# Patient Record
Sex: FEMALE | Race: WHITE | Employment: OTHER | URBAN - METROPOLITAN AREA
[De-identification: names, ages, dates, MRNs, and addresses within clinical notes are randomized per-mention and may not be internally consistent; named-entity substitution may affect disease eponyms.]

---

## 2017-04-06 ENCOUNTER — HOSPITAL ENCOUNTER (OUTPATIENT)
Dept: ULTRASOUND IMAGING | Age: 62
Discharge: HOME | End: 2017-04-06
Payer: COMMERCIAL

## 2017-04-06 DIAGNOSIS — R10.9 ABDOMINAL PAIN, UNSPECIFIED SITE: ICD-10-CM

## 2017-04-06 PROCEDURE — 93975 VASCULAR STUDY: CPT

## 2017-04-06 PROCEDURE — 76705 ECHO EXAM OF ABDOMEN: CPT

## 2017-04-07 ENCOUNTER — HOSPITAL ENCOUNTER (OUTPATIENT)
Dept: LAB | Age: 62
Discharge: HOME | End: 2017-04-07
Payer: COMMERCIAL

## 2017-04-07 LAB
ALBUMIN SERPL-MCNC: 4.6 G/DL (ref 3.9–4.9)
ALP BLD-CCNC: 48 U/L (ref 40–130)
ALT SERPL-CCNC: 31 U/L (ref 0–33)
AMYLASE: 51 U/L (ref 28–100)
AST SERPL-CCNC: 27 U/L (ref 0–35)
BILIRUB SERPL-MCNC: 0.4 MG/DL (ref 0–1.2)
BILIRUBIN DIRECT: 0 MG/DL (ref 0–0.3)
BILIRUBIN, INDIRECT: 0.4 MG/DL (ref 0–0.6)
LIPASE: 25 U/L (ref 13–60)
TOTAL PROTEIN: 7.1 G/DL (ref 6.4–8.1)

## 2017-04-07 PROCEDURE — 82150 ASSAY OF AMYLASE: CPT

## 2017-04-07 PROCEDURE — 83690 ASSAY OF LIPASE: CPT

## 2017-04-07 PROCEDURE — 80076 HEPATIC FUNCTION PANEL: CPT

## 2023-07-18 LAB
BACTERIA, URINE: ABNORMAL /HPF
RBC, URINE: 4 /HPF (ref 0–5)
SQUAMOUS EPITHELIAL CELLS, URINE: 2 /HPF
TRANSITIONAL EPITHELIAL CELLS, URINE: <1 /HPF
WBC, URINE: 16 /HPF (ref 0–5)

## 2023-07-20 LAB — URINE CULTURE: ABNORMAL

## 2023-09-13 ENCOUNTER — HOSPITAL ENCOUNTER (OUTPATIENT)
Dept: DATA CONVERSION | Facility: HOSPITAL | Age: 68
End: 2023-09-13
Attending: INTERNAL MEDICINE | Admitting: INTERNAL MEDICINE
Payer: MEDICARE

## 2023-09-13 DIAGNOSIS — Z85.038 PERSONAL HISTORY OF OTHER MALIGNANT NEOPLASM OF LARGE INTESTINE: ICD-10-CM

## 2023-09-13 DIAGNOSIS — K63.5 POLYP OF COLON: ICD-10-CM

## 2023-09-13 DIAGNOSIS — Z98.0 INTESTINAL BYPASS AND ANASTOMOSIS STATUS: ICD-10-CM

## 2023-09-13 DIAGNOSIS — Z12.11 ENCOUNTER FOR SCREENING FOR MALIGNANT NEOPLASM OF COLON: ICD-10-CM

## 2023-09-18 LAB
COMPLETE PATHOLOGY REPORT: NORMAL
CONVERTED CLINICAL DIAGNOSIS-HISTORY: NORMAL
CONVERTED FINAL DIAGNOSIS: NORMAL
CONVERTED FINAL REPORT PDF LINK TO COPY AND PASTE: NORMAL
CONVERTED GROSS DESCRIPTION: NORMAL

## 2023-09-29 VITALS — HEIGHT: 60 IN | WEIGHT: 211.64 LBS | BODY MASS INDEX: 41.55 KG/M2

## 2023-10-04 PROBLEM — E87.1 HYPONATREMIA: Status: ACTIVE | Noted: 2023-10-04

## 2023-10-04 PROBLEM — R35.1 NOCTURIA: Status: ACTIVE | Noted: 2023-10-04

## 2023-10-04 PROBLEM — R12 HEARTBURN: Status: ACTIVE | Noted: 2023-10-04

## 2023-10-04 PROBLEM — Z90.49 S/P LAPAROSCOPIC COLECTOMY: Status: ACTIVE | Noted: 2023-10-04

## 2023-10-04 PROBLEM — R51.9 HEADACHE: Status: ACTIVE | Noted: 2023-10-04

## 2023-10-04 PROBLEM — K29.70 GASTRITIS: Status: ACTIVE | Noted: 2023-10-04

## 2023-10-04 PROBLEM — E66.3 OVERWEIGHT: Status: ACTIVE | Noted: 2023-10-04

## 2023-10-04 PROBLEM — K22.10 ESOPHAGEAL ULCER: Status: ACTIVE | Noted: 2023-10-04

## 2023-10-04 PROBLEM — R73.9 BLOOD GLUCOSE ELEVATED: Status: ACTIVE | Noted: 2023-10-04

## 2023-10-04 PROBLEM — I61.9 STROKE, HEMORRHAGIC (MULTI): Status: ACTIVE | Noted: 2023-10-04

## 2023-10-04 PROBLEM — N39.8 VOIDING DYSFUNCTION: Status: ACTIVE | Noted: 2023-10-04

## 2023-10-04 PROBLEM — E55.9 VITAMIN D DEFICIENCY: Status: ACTIVE | Noted: 2023-10-04

## 2023-10-04 PROBLEM — K21.9 HIATAL HERNIA WITH GASTROESOPHAGEAL REFLUX: Status: ACTIVE | Noted: 2023-10-04

## 2023-10-04 PROBLEM — R73.01 IMPAIRED FASTING GLUCOSE: Status: ACTIVE | Noted: 2023-10-04

## 2023-10-04 PROBLEM — R10.9 ABDOMINAL PAIN: Status: ACTIVE | Noted: 2023-10-04

## 2023-10-04 PROBLEM — I10 HYPERTENSION: Status: ACTIVE | Noted: 2023-10-04

## 2023-10-04 PROBLEM — R63.4 WEIGHT LOSS, ABNORMAL: Status: ACTIVE | Noted: 2023-10-04

## 2023-10-04 PROBLEM — R63.0 DECREASED APPETITE: Status: ACTIVE | Noted: 2023-10-04

## 2023-10-04 PROBLEM — M85.80 OSTEOPENIA: Status: ACTIVE | Noted: 2023-10-04

## 2023-10-04 PROBLEM — K59.00 CONSTIPATION: Status: ACTIVE | Noted: 2023-10-04

## 2023-10-04 PROBLEM — N76.0 VAGINITIS: Status: ACTIVE | Noted: 2023-10-04

## 2023-10-04 PROBLEM — L30.9 ECZEMA: Status: ACTIVE | Noted: 2023-10-04

## 2023-10-04 PROBLEM — J44.9 COPD (CHRONIC OBSTRUCTIVE PULMONARY DISEASE) (MULTI): Status: ACTIVE | Noted: 2023-10-04

## 2023-10-04 PROBLEM — C18.9 ADENOCARCINOMA, COLON (MULTI): Status: ACTIVE | Noted: 2023-10-04

## 2023-10-04 PROBLEM — E78.5 HYPERLIPIDEMIA: Status: ACTIVE | Noted: 2023-10-04

## 2023-10-04 PROBLEM — K44.9 HIATAL HERNIA WITH GASTROESOPHAGEAL REFLUX: Status: ACTIVE | Noted: 2023-10-04

## 2023-10-04 PROBLEM — F32.A DEPRESSION: Status: ACTIVE | Noted: 2023-10-04

## 2023-10-04 PROBLEM — R60.9 DEPENDENT EDEMA: Status: ACTIVE | Noted: 2023-10-04

## 2023-10-04 PROBLEM — K20.90 ESOPHAGITIS: Status: ACTIVE | Noted: 2023-10-04

## 2023-10-04 PROBLEM — F17.200 NICOTINE DEPENDENCE: Status: ACTIVE | Noted: 2023-10-04

## 2023-10-04 PROBLEM — F41.9 ANXIETY: Status: ACTIVE | Noted: 2023-10-04

## 2023-10-04 PROBLEM — R19.00 PELVIC MASS IN FEMALE: Status: ACTIVE | Noted: 2023-10-04

## 2023-10-04 PROBLEM — R19.5 POSITIVE COLORECTAL CANCER SCREENING USING COLOGUARD TEST: Status: ACTIVE | Noted: 2023-10-04

## 2023-10-04 PROBLEM — R34 ANURIA: Status: ACTIVE | Noted: 2023-10-04

## 2023-10-04 PROBLEM — M19.90 OSTEOARTHRITIS: Status: ACTIVE | Noted: 2023-10-04

## 2023-10-04 PROBLEM — D12.6 TUBULAR ADENOMA OF COLON: Status: ACTIVE | Noted: 2023-10-04

## 2023-10-04 PROBLEM — R13.10 DYSPHAGIA: Status: ACTIVE | Noted: 2023-10-04

## 2023-10-04 RX ORDER — OMEPRAZOLE 40 MG/1
CAPSULE, DELAYED RELEASE ORAL
COMMUNITY
Start: 2023-08-31 | End: 2023-10-31

## 2023-10-04 RX ORDER — MULTIVITAMIN
1 TABLET ORAL DAILY
COMMUNITY

## 2023-10-04 RX ORDER — BENZOCAINE 200 MG/G
GEL DENTAL; ORAL; PERIODONTAL
COMMUNITY
Start: 2023-09-01 | End: 2024-04-30 | Stop reason: ENTERED-IN-ERROR

## 2023-10-04 RX ORDER — HYDRALAZINE HYDROCHLORIDE 50 MG/1
1 TABLET, FILM COATED ORAL DAILY
COMMUNITY
End: 2024-04-30 | Stop reason: ENTERED-IN-ERROR

## 2023-10-04 RX ORDER — SERTRALINE HYDROCHLORIDE 50 MG/1
50 TABLET, FILM COATED ORAL DAILY
COMMUNITY

## 2023-10-04 RX ORDER — CARVEDILOL 12.5 MG/1
1 TABLET ORAL DAILY
COMMUNITY

## 2023-10-04 RX ORDER — HYDRALAZINE HYDROCHLORIDE 25 MG/1
1 TABLET, FILM COATED ORAL 2 TIMES DAILY
COMMUNITY
End: 2024-04-30 | Stop reason: ENTERED-IN-ERROR

## 2023-10-04 RX ORDER — BUSPIRONE HYDROCHLORIDE 10 MG/1
TABLET ORAL
COMMUNITY
End: 2024-04-30 | Stop reason: ENTERED-IN-ERROR

## 2023-10-04 RX ORDER — DOCUSATE SODIUM 100 MG/1
100 CAPSULE, LIQUID FILLED ORAL 2 TIMES DAILY
COMMUNITY
Start: 2023-05-17 | End: 2024-04-30 | Stop reason: ENTERED-IN-ERROR

## 2023-10-04 RX ORDER — POLYETHYLENE GLYCOL 3350 17 G/17G
POWDER, FOR SOLUTION ORAL
COMMUNITY
Start: 2023-05-17 | End: 2024-04-30 | Stop reason: ENTERED-IN-ERROR

## 2023-10-04 RX ORDER — ACETAMINOPHEN 500 MG
TABLET ORAL
COMMUNITY
Start: 2023-09-01 | End: 2024-04-30 | Stop reason: ENTERED-IN-ERROR

## 2023-10-04 RX ORDER — CHLORHEXIDINE GLUCONATE ORAL RINSE 1.2 MG/ML
SOLUTION DENTAL
COMMUNITY
Start: 2023-09-01 | End: 2024-04-30 | Stop reason: ENTERED-IN-ERROR

## 2023-10-04 RX ORDER — TRAZODONE HYDROCHLORIDE 100 MG/1
1 TABLET ORAL DAILY
COMMUNITY
End: 2024-04-30 | Stop reason: ENTERED-IN-ERROR

## 2023-10-04 RX ORDER — ROSUVASTATIN CALCIUM 10 MG/1
10 TABLET, COATED ORAL DAILY
COMMUNITY

## 2023-10-04 RX ORDER — HYDROXYZINE HYDROCHLORIDE 25 MG/1
1 TABLET, FILM COATED ORAL 3 TIMES DAILY PRN
COMMUNITY
End: 2024-04-30 | Stop reason: ENTERED-IN-ERROR

## 2023-10-04 RX ORDER — BISACODYL 10 MG/1
SUPPOSITORY RECTAL
COMMUNITY
Start: 2023-05-17 | End: 2024-04-30 | Stop reason: ENTERED-IN-ERROR

## 2023-10-04 RX ORDER — AMLODIPINE BESYLATE 10 MG/1
TABLET ORAL
COMMUNITY
Start: 2023-07-23 | End: 2024-04-30 | Stop reason: ENTERED-IN-ERROR

## 2023-10-04 RX ORDER — ARIPIPRAZOLE 10 MG/1
TABLET ORAL
COMMUNITY
End: 2024-04-30 | Stop reason: ENTERED-IN-ERROR

## 2023-10-04 RX ORDER — MAG HYDROX/ALUMINUM HYD/SIMETH 200-200-20
1 SUSPENSION, ORAL (FINAL DOSE FORM) ORAL AS NEEDED
COMMUNITY
Start: 2023-09-01

## 2023-10-04 RX ORDER — IBUPROFEN 800 MG/1
TABLET ORAL
COMMUNITY
Start: 2023-08-21 | End: 2024-04-30 | Stop reason: ENTERED-IN-ERROR

## 2023-10-05 ENCOUNTER — LAB (OUTPATIENT)
Dept: LAB | Facility: LAB | Age: 68
End: 2023-10-05
Payer: MEDICARE

## 2023-10-05 ENCOUNTER — OFFICE VISIT (OUTPATIENT)
Dept: GASTROENTEROLOGY | Facility: CLINIC | Age: 68
End: 2023-10-05
Payer: MEDICARE

## 2023-10-05 VITALS
DIASTOLIC BLOOD PRESSURE: 58 MMHG | SYSTOLIC BLOOD PRESSURE: 90 MMHG | HEART RATE: 54 BPM | HEIGHT: 60 IN | BODY MASS INDEX: 41.33 KG/M2

## 2023-10-05 DIAGNOSIS — R93.3 ABNORMAL COLONOSCOPY: Primary | ICD-10-CM

## 2023-10-05 DIAGNOSIS — Z00.00 HEALTH CARE MAINTENANCE: ICD-10-CM

## 2023-10-05 LAB
CREAT SERPL-MCNC: 0.83 MG/DL (ref 0.5–1.05)
GFR SERPL CREATININE-BSD FRML MDRD: 78 ML/MIN/1.73M*2

## 2023-10-05 PROCEDURE — 36415 COLL VENOUS BLD VENIPUNCTURE: CPT

## 2023-10-05 PROCEDURE — 3078F DIAST BP <80 MM HG: CPT | Performed by: NURSE PRACTITIONER

## 2023-10-05 PROCEDURE — 3074F SYST BP LT 130 MM HG: CPT | Performed by: NURSE PRACTITIONER

## 2023-10-05 PROCEDURE — 82565 ASSAY OF CREATININE: CPT

## 2023-10-05 PROCEDURE — 99214 OFFICE O/P EST MOD 30 MIN: CPT | Performed by: NURSE PRACTITIONER

## 2023-10-05 NOTE — PROGRESS NOTES
This note was created using voice recognition transcription software. Despite proofreading, unintentional typographical errors may be present. Please contact the GI office with any questions or concerns.     This is a 68 yo F who comes to the office today for a follow-up in regards to constipation. She originally established care with myself on 8/2/23 and came in for a colonoscopy screening, constipation and evaluation of heartburn. She is accompanied by her mom and currently lives with her elderly parents. She has a PMH of colon CA s/p L partial colectomy (2021), depression, anxiety, dyslipidemia, HTN, former ETOH abuse, former tobacco use, and constipation.    8/2/23-She was tearful during the visit and admitted to much depression. She has poor dentition and has lost about 40 lbs in the last 6 mos d/t to this, and heartburn. She has difficulty chewing. She denies abdominal pain. She states she is long overdue for a colonoscopy and was scheduled for one at some point but cancelled d/t severe constipation. She has gone as long as 2-3 mos without a BM. Typical once a month. Was taking Miralax daily and 3 weeks ago, added Metamucil at nighttime, now is having daily BM's but they hurt coming out. Doesn't get a lot of activity d/t depression. Trying to go for walks.       8/30/23-Patient states she's taking Miralax bid and Metamucil daily and she is now having small BM's daily. She feels much more improved but still incompletely evacuating. Hasn't taken Boost/Ensure drinks. Has been getting dental work done but it's not completed so chewing is difficult. Still eating a lot of processed foods.      10/5/23-    +Cologuard 7/20/21  EGD-12/7/21 showing duodenitis, gastritis, medium sized hiatal hernia, LA Grade B esophagitis. 10/7/16 showing gastritis.  Colonoscopy-9/13/23, 11/22/21, 10/20/21  BM-Daily, no bleeding. Weight loss as stated above. Longest without a BM was 2-3 mos. Typical once monthly.  Denies any constipation  at this time.  FHX-Dad had soft tissue sarcoma, Maunt had pancreatic CA  SHX-Former ETOH abuse, former tobacco use, never illicits  Ab Sx-Partial :L colectomy (2021)  NSAIDs-Denies       Current Medications: reviewed    A 10 point review of system is negative except for what is mentioned in the HPI    Vital Signs: Reviewed    Physical Exam:  General: Calm  Skin: Warm and dry, no jaundice  HEENT: No scleral icterus, no conjunctival pallor, normocephalic, atraumatic, mucous membranes moist  Neck: atraumatic, trachea midline, no JVD  Chest: Clear to auscultation bilaterally. No wheezes, rales, or rhonchi  CV: Regular rate and rhythm. Positive S1/S2  Abdomen: no distension, +BS, soft, non-tender to palpation, no rebound tenderness, no guarding, no rigidity, no discernible ascites   Extremities: no lower extremity edema, Chronic pigmentary changes, no cyanosis  Neurological: A&Ox3 , no asterixis  Psychiatric: cooperative     Investigations:  Labs, radiological imaging and cardiac work up were reviewed    Visit Vitals  BP 90/58   Pulse 54   Ht 1.524 m (5')   BMI 41.33 kg/m²   Smoking Status Never   BSA 2.02 m²      Medication Documentation Review Audit    **Prior to Admission medications have not yet been reviewed**      Assessment:      This is a 68 yo F who comes to the office today for a follow-up in regards to constipation. She originally established care with myself on 8/2/23 and came in for a colonoscopy screening, constipation and evaluation of heartburn. She has a PMH of colon CA s/p L partial colectomy (2021), depression, anxiety, dyslipidemia, HTN, former ETOH abuse, former tobacco use, and constipation.  Colonoscopy was performed on 9/13/23 by Dr. Mansfield and it showed s/p L  colectomy with end to end anastomosis.  He wasn't able to extend past the distal portion as there was severe restricted mobility.  This needs further evaluated.  Of note, she states she is no longer constipated.  Eating a diet high in  fiber, going for walks and drinking plenty of fluid.    Plan  1.)  Colonoscopy screening-Repeat in 2 years.    2.)  Restricted mobility during colonoscopy-CTE, BMP  3.)  Follow-up after testing.    Total time spent was approximately 30 minutes.

## 2023-10-17 ENCOUNTER — HOSPITAL ENCOUNTER (OUTPATIENT)
Dept: RADIOLOGY | Facility: CLINIC | Age: 68
Discharge: HOME | End: 2023-10-17
Payer: MEDICARE

## 2023-10-17 DIAGNOSIS — R93.3 ABNORMAL COLONOSCOPY: ICD-10-CM

## 2023-10-17 DIAGNOSIS — K76.0 FATTY LIVER: Primary | ICD-10-CM

## 2023-10-17 PROCEDURE — 74177 CT ABD & PELVIS W/CONTRAST: CPT | Mod: MG

## 2023-10-17 PROCEDURE — 74177 CT ABD & PELVIS W/CONTRAST: CPT | Performed by: STUDENT IN AN ORGANIZED HEALTH CARE EDUCATION/TRAINING PROGRAM

## 2023-10-17 PROCEDURE — 2550000001 HC RX 255 CONTRASTS: Performed by: NURSE PRACTITIONER

## 2023-10-17 RX ADMIN — IOHEXOL 90 ML: 350 INJECTION, SOLUTION INTRAVENOUS at 10:24

## 2023-10-20 ENCOUNTER — TELEPHONE (OUTPATIENT)
Dept: GASTROENTEROLOGY | Facility: CLINIC | Age: 68
End: 2023-10-20
Payer: MEDICARE

## 2023-10-20 NOTE — TELEPHONE ENCOUNTER
Called and spoke to Tuyet and gave her the results of her CT and to get a Fibroscan and to see a dietician. HUSSAIN

## 2023-10-24 DIAGNOSIS — R12 HEARTBURN: ICD-10-CM

## 2023-10-30 RX ORDER — LISINOPRIL 10 MG/1
10 TABLET ORAL
COMMUNITY
Start: 2017-09-11 | End: 2024-04-30 | Stop reason: ENTERED-IN-ERROR

## 2023-10-30 RX ORDER — TRIAMCINOLONE ACETONIDE 1 MG/G
0.1 CREAM TOPICAL 2 TIMES DAILY
COMMUNITY
Start: 2017-09-11 | End: 2024-04-30 | Stop reason: ENTERED-IN-ERROR

## 2023-10-31 RX ORDER — OMEPRAZOLE 40 MG/1
CAPSULE, DELAYED RELEASE ORAL
Qty: 30 CAPSULE | Refills: 2 | Status: SHIPPED | OUTPATIENT
Start: 2023-10-31

## 2024-01-02 DIAGNOSIS — C18.9 ADENOCARCINOMA, COLON (MULTI): Primary | ICD-10-CM

## 2024-01-15 ENCOUNTER — APPOINTMENT (OUTPATIENT)
Dept: HEMATOLOGY/ONCOLOGY | Facility: CLINIC | Age: 69
End: 2024-01-15
Payer: MEDICARE

## 2024-04-03 ENCOUNTER — LAB (OUTPATIENT)
Dept: LAB | Facility: CLINIC | Age: 69
End: 2024-04-03
Payer: MEDICARE

## 2024-04-03 ENCOUNTER — OFFICE VISIT (OUTPATIENT)
Dept: HEMATOLOGY/ONCOLOGY | Facility: CLINIC | Age: 69
End: 2024-04-03
Payer: MEDICARE

## 2024-04-03 VITALS
DIASTOLIC BLOOD PRESSURE: 71 MMHG | BODY MASS INDEX: 27.47 KG/M2 | RESPIRATION RATE: 16 BRPM | TEMPERATURE: 97.5 F | OXYGEN SATURATION: 95 % | HEIGHT: 59 IN | WEIGHT: 136.24 LBS | HEART RATE: 86 BPM | SYSTOLIC BLOOD PRESSURE: 166 MMHG

## 2024-04-03 DIAGNOSIS — C18.9 ADENOCARCINOMA, COLON (MULTI): Primary | ICD-10-CM

## 2024-04-03 DIAGNOSIS — C18.9 ADENOCARCINOMA, COLON (MULTI): ICD-10-CM

## 2024-04-03 LAB
ALBUMIN SERPL BCP-MCNC: 3.9 G/DL (ref 3.4–5)
ALP SERPL-CCNC: 56 U/L (ref 33–136)
ALT SERPL W P-5'-P-CCNC: 15 U/L (ref 7–45)
ANION GAP SERPL CALC-SCNC: 11 MMOL/L (ref 10–20)
AST SERPL W P-5'-P-CCNC: 21 U/L (ref 9–39)
BASOPHILS # BLD AUTO: 0.02 X10*3/UL (ref 0–0.1)
BASOPHILS NFR BLD AUTO: 0.3 %
BILIRUB SERPL-MCNC: 0.4 MG/DL (ref 0–1.2)
BUN SERPL-MCNC: 4 MG/DL (ref 6–23)
CALCIUM SERPL-MCNC: 9 MG/DL (ref 8.6–10.3)
CEA SERPL-MCNC: 5.6 UG/L
CHLORIDE SERPL-SCNC: 98 MMOL/L (ref 98–107)
CO2 SERPL-SCNC: 27 MMOL/L (ref 21–32)
CREAT SERPL-MCNC: 0.69 MG/DL (ref 0.5–1.05)
EGFRCR SERPLBLD CKD-EPI 2021: >90 ML/MIN/1.73M*2
EOSINOPHIL # BLD AUTO: 0.31 X10*3/UL (ref 0–0.7)
EOSINOPHIL NFR BLD AUTO: 4.6 %
ERYTHROCYTE [DISTWIDTH] IN BLOOD BY AUTOMATED COUNT: 14.1 % (ref 11.5–14.5)
GLUCOSE SERPL-MCNC: 118 MG/DL (ref 74–99)
HCT VFR BLD AUTO: 41.3 % (ref 36–46)
HGB BLD-MCNC: 14.2 G/DL (ref 12–16)
IMM GRANULOCYTES # BLD AUTO: 0.02 X10*3/UL (ref 0–0.7)
IMM GRANULOCYTES NFR BLD AUTO: 0.3 % (ref 0–0.9)
LYMPHOCYTES # BLD AUTO: 1.73 X10*3/UL (ref 1.2–4.8)
LYMPHOCYTES NFR BLD AUTO: 25.5 %
MCH RBC QN AUTO: 30.8 PG (ref 26–34)
MCHC RBC AUTO-ENTMCNC: 34.4 G/DL (ref 32–36)
MCV RBC AUTO: 90 FL (ref 80–100)
MONOCYTES # BLD AUTO: 0.63 X10*3/UL (ref 0.1–1)
MONOCYTES NFR BLD AUTO: 9.3 %
NEUTROPHILS # BLD AUTO: 4.07 X10*3/UL (ref 1.2–7.7)
NEUTROPHILS NFR BLD AUTO: 60 %
NRBC BLD-RTO: 0 /100 WBCS (ref 0–0)
PLATELET # BLD AUTO: 289 X10*3/UL (ref 150–450)
POTASSIUM SERPL-SCNC: 3.6 MMOL/L (ref 3.5–5.3)
PROT SERPL-MCNC: 7.1 G/DL (ref 6.4–8.2)
RBC # BLD AUTO: 4.61 X10*6/UL (ref 4–5.2)
SODIUM SERPL-SCNC: 132 MMOL/L (ref 136–145)
WBC # BLD AUTO: 6.8 X10*3/UL (ref 4.4–11.3)

## 2024-04-03 PROCEDURE — 3077F SYST BP >= 140 MM HG: CPT | Performed by: INTERNAL MEDICINE

## 2024-04-03 PROCEDURE — 99213 OFFICE O/P EST LOW 20 MIN: CPT | Performed by: INTERNAL MEDICINE

## 2024-04-03 PROCEDURE — 82378 CARCINOEMBRYONIC ANTIGEN: CPT | Mod: PARLAB

## 2024-04-03 PROCEDURE — 85025 COMPLETE CBC W/AUTO DIFF WBC: CPT

## 2024-04-03 PROCEDURE — 36415 COLL VENOUS BLD VENIPUNCTURE: CPT

## 2024-04-03 PROCEDURE — 84075 ASSAY ALKALINE PHOSPHATASE: CPT

## 2024-04-03 PROCEDURE — 1159F MED LIST DOCD IN RCRD: CPT | Performed by: INTERNAL MEDICINE

## 2024-04-03 PROCEDURE — 1126F AMNT PAIN NOTED NONE PRSNT: CPT | Performed by: INTERNAL MEDICINE

## 2024-04-03 PROCEDURE — 3078F DIAST BP <80 MM HG: CPT | Performed by: INTERNAL MEDICINE

## 2024-04-03 ASSESSMENT — PAIN SCALES - GENERAL: PAINLEVEL: 0-NO PAIN

## 2024-04-03 ASSESSMENT — COLUMBIA-SUICIDE SEVERITY RATING SCALE - C-SSRS
6. HAVE YOU EVER DONE ANYTHING, STARTED TO DO ANYTHING, OR PREPARED TO DO ANYTHING TO END YOUR LIFE?: NO
2. HAVE YOU ACTUALLY HAD ANY THOUGHTS OF KILLING YOURSELF?: NO
1. IN THE PAST MONTH, HAVE YOU WISHED YOU WERE DEAD OR WISHED YOU COULD GO TO SLEEP AND NOT WAKE UP?: NO

## 2024-04-03 ASSESSMENT — PATIENT HEALTH QUESTIONNAIRE - PHQ9
1. LITTLE INTEREST OR PLEASURE IN DOING THINGS: NOT AT ALL
SUM OF ALL RESPONSES TO PHQ9 QUESTIONS 1 AND 2: 0
2. FEELING DOWN, DEPRESSED OR HOPELESS: NOT AT ALL

## 2024-04-03 NOTE — PROGRESS NOTES
Cancer History:   Treatment Synopsis:    FINAL DIAGNOSIS   A. COLON, LEFT PARTIAL COLECTOMY:   -- NO EVIDENCE OF RESIDUAL CARCINOMA; SEE COMMENT AND SUMMARY REPORT.   -- AREA  OF TATTOO AND REACTIVE CHANGES CONSISTENT WITH PRIOR BIOPSY SITE   PRESENT.   -- TWENTY LYMPH NODES, NEGATIVE FOR TUMOR (0/20).   -- MARGINS OF RESECTION, NEGATIVE FOR DYSPLASIA AND TUMOR.   PATHOLOGIC STAGE CLASSIFICATION (pTNM, AJCC 8th Edition)   pT Category: pT1   pN Category: pN0, Mx  Satge I               History of Present Illness:      ID Statement:    DEANGELO MAYBERRY is a 67 year old Female        Interval History:    The patient was referred to me by Dr. Saha for further evaluation and management of adenocarcinoma of the colon. The patient presents as a new patient on 03/07/2022.      The patient is a 66 year old with a past medical history significant for HTN, anxiety and depression, B12 and folate deficiencies.  The patient  was doing well until she started to have progressive abdominal pain. The patient has been evaluated with a  colonoscopy that revealed abnormal findings. Pathology was consistent with adenocarcinoma of the colon. The patient then underwent a left colectomy. She was then referred to me for further evaluation and management.      At interview on 03/07/2022 the patient narrated her past medical history. The patient denies any history of fevers, night sweats, unexplained weight loss, shortness of breath, chest pain, constipation, confusion, nausea, vomiting, diarrhea, hemoptysis,  hematemesis, hematuria and hematochezia. No history of localized or systemic bleeding and infection.      The patient had come for a follow up on April 3, 2024 regarding her history of   adenocarcinoma of the colon. The patient presents to discuss treatment plans. She denied any new symptoms at this time other than increased anxiety by which she was recently  admitted to a psychiatric facility to assist in managing her increased anxiety.       Past Medical History:   1. Adenocarcinoma of the colon   2. HTN   3. Anxiety and depression   4. B12 and folate deficiencies      Past Surgical History:   1. Colonoscopy and colectomy      Last mammogram was a few years ago.   Last colonoscopy was in 2022.            Review of Systems:   ·  System Review All other systems have been reviewed and are negative for complaint.            Allergies and Intolerances:       Allergies:         No Known Allergies: Active     Outpatient Medication Profile:  * Patient Currently Takes Medications as of 10-Jul-2023 13:18 documented in Structured Notes         Multiple Vitamins oral tablet : Last Dose Taken:  , 1 tab(s) orally once a day---HOLDING         Vitamin B12 50 mcg oral tablet: Last Dose Taken:  , 1 tab(s) orally once  a day         rosuvastatin 10 mg oral tablet: Last Dose Taken:  , 1 tab(s) orally once  a day         amLODIPine 10 mg oral tablet: Last Dose Taken:  , 1 tab(s) orally once  a day         Zoloft 50 mg oral tablet: Last Dose Taken:  , 1 tab(s) orally once a  day         carvedilol 12.5 mg oral tablet: Last Dose Taken:  , 1 tab(s) orally 2  times a day         hydrALAZINE 25 mg oral tablet: Last Dose Taken:  , 1 tab(s) orally 4  times a day        Family History: No Family History items are recorded  in the problem list.      Social History:   Social Substance History:  ·  Smoking Status former smoker   ·  Tobacco Use history of abuse   ·  Alcohol Use occasionally   ·  Drug Use denies   ·  Additional History     66 years old      Has 2 grown children   Worked as a    Used to smoke 1-2 packs a day for 40 years then quit   Occasional drinking   No drugs (1)           Vitals and Measurements:   Vitals: Temp: 36.6  HR: 77  RR: 18  BP: 130/74  SPO2%:   97   Measurements: HT(cm): 151  WT(kg): 50  BSA: 1.44   BMI:  21.9      Physical Exam:      Constitutional: Well developed, awake/alert/oriented  x3, no distress, alert and cooperative    Eyes: PERRL, EOMI, clear sclera   ENMT: mucous membranes moist, no apparent injury,  no lesions seen   Head/Neck: Neck supple, no apparent injury, thyroid  without mass or tenderness, No JVD, trachea midline, no bruits   Respiratory/Thorax: Patent airways, CTAB, normal  breath sounds with good chest expansion, thorax symmetric   Cardiovascular: Regular, rate and rhythm, no murmurs,  2+ equal pulses of the extremities, normal S 1and S 2   Gastrointestinal: Nondistended, soft, non-tender,  no rebound tenderness or guarding, no masses palpable, no organomegaly, +BS, no bruits   Genitourinary: No Discharge, vesicles or other abnormalities   Musculoskeletal: ROM intact, no joint swelling, normal  strength   Extremities: normal extremities, no cyanosis edema,  contusions or wounds, no clubbing   Neurological: alert and oriented x3, intact senses,  motor, response and reflexes, normal strength   Breast: No masses, tenderness, no discharge or discoloration   Lymphatic: No significant lymphadenopathy   Psychological: Appropriate mood and behavior   Skin: Warm and dry, no lesions, no rashes         Lab Results:           Pathology Results:     ·  Results     No Results have been selected. Please select Results from the Available Results list before marking as Notified.     Assessment and Plan:         1. Adenocarcinoma of the colon     The patient was referred to me by Dr. Saha for further evaluation and management of adenocarcinoma of the colon. The patient presents as a new patient on 03/07/2022.      The patient is a 66 year old with a past medical history significant for HTN, anxiety and depression, B12 and folate deficiencies.  The patient  was doing well until she started to have progressive abdominal pain. The patient has been evaluated with a  colonoscopy that revealed abnormal findings. Pathology was consistent with adenocarcinoma of the colon.  CT scan of chest abdomen pelvis in October 2021 did not reveal  evidence of metastatic disease.  The patient then underwent a left colectomy. Physical  exam was within normal limits. I reviewed the lab data with the patient. Most recent CBC obtained on 12/01/2021 revealed WBC 9.5, HGB 13.8, HCT 40.1, , Neut 5.87. I had a detailed discussion with the patient and explained to her that she is early  stage and has a good prognosis. I explained the pathophysiology of adenocarcinoma of the colon and described possible treatment plans. I explained to the patient that I would review her lab, pathology and imaging results and then devise a treatment plan  with her at her next visit. Return in 2 weeks.      The patient had come for a follow up on April 3, 2024.   I reviewed the lab data with the patient.  The patient should be followed clinically at this time as she is s/p colectomy with no additional treatment. She  declined colon cancer prevention trial. She should be seen by  to assist with her anxiety.  The patient claims that recently she was evaluated at [facility] for constipation.  A CAT scan did not reveal any evidence of local  distal recurrence.  The patient received symptom relief treatment and was discharged.        2. HTN   - Carvedilol 6.25 mg PO QD   - Hydralazine 50 mg PO QD      3. Anxiety and depression  - Zoloft 25mg PO QD      4. B12 and folate deficiencies   - Folic acid 1 mg  PO QD   - Vitamin B12 50 mcg PO QD

## 2024-04-30 ENCOUNTER — APPOINTMENT (OUTPATIENT)
Dept: RADIOLOGY | Facility: HOSPITAL | Age: 69
DRG: 552 | End: 2024-04-30
Payer: MEDICARE

## 2024-04-30 ENCOUNTER — HOSPITAL ENCOUNTER (EMERGENCY)
Facility: HOSPITAL | Age: 69
Discharge: OTHER INSTITUTION | End: 2024-04-30
Attending: STUDENT IN AN ORGANIZED HEALTH CARE EDUCATION/TRAINING PROGRAM
Payer: MEDICARE

## 2024-04-30 ENCOUNTER — APPOINTMENT (OUTPATIENT)
Dept: RADIOLOGY | Facility: HOSPITAL | Age: 69
End: 2024-04-30
Payer: MEDICARE

## 2024-04-30 ENCOUNTER — HOSPITAL ENCOUNTER (INPATIENT)
Facility: HOSPITAL | Age: 69
LOS: 1 days | Discharge: HOME | DRG: 552 | End: 2024-05-02
Attending: EMERGENCY MEDICINE | Admitting: SURGERY
Payer: MEDICARE

## 2024-04-30 ENCOUNTER — HOSPITAL ENCOUNTER (OUTPATIENT)
Dept: CARDIOLOGY | Facility: HOSPITAL | Age: 69
Discharge: HOME | End: 2024-04-30
Payer: MEDICARE

## 2024-04-30 DIAGNOSIS — K66.8 PERITONEAL FREE AIR: Primary | ICD-10-CM

## 2024-04-30 DIAGNOSIS — Z90.49 S/P LAPAROSCOPIC COLECTOMY: ICD-10-CM

## 2024-04-30 DIAGNOSIS — S22.080D COMPRESSION FRACTURE OF T11 VERTEBRA WITH ROUTINE HEALING: ICD-10-CM

## 2024-04-30 DIAGNOSIS — I24.9 ACS (ACUTE CORONARY SYNDROME) (MULTI): ICD-10-CM

## 2024-04-30 DIAGNOSIS — Z04.1 EXAM FOLLOWING MVC (MOTOR VEHICLE COLLISION), NO APPARENT INJURY: Primary | ICD-10-CM
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
## 2024-04-30 DIAGNOSIS — S32.029A CLOSED FRACTURE OF SECOND LUMBAR VERTEBRA, UNSPECIFIED FRACTURE MORPHOLOGY, INITIAL ENCOUNTER (MULTI): ICD-10-CM

## 2024-04-30 DIAGNOSIS — K66.8 INTRA-ABDOMINAL FREE AIR OF UNKNOWN ETIOLOGY: ICD-10-CM

## 2024-04-30 LAB
ABO GROUP (TYPE) IN BLOOD: NORMAL
ALBUMIN SERPL BCP-MCNC: 4.2 G/DL (ref 3.4–5)
ALP SERPL-CCNC: 70 U/L (ref 33–136)
ALT SERPL W P-5'-P-CCNC: 17 U/L (ref 7–45)
ANION GAP SERPL CALC-SCNC: 11 MMOL/L (ref 10–20)
ANTIBODY SCREEN: NORMAL
AST SERPL W P-5'-P-CCNC: 26 U/L (ref 9–39)
BASOPHILS # BLD AUTO: 0.02 X10*3/UL (ref 0–0.1)
BASOPHILS NFR BLD AUTO: 0.3 %
BILIRUB SERPL-MCNC: 0.5 MG/DL (ref 0–1.2)
BUN SERPL-MCNC: 4 MG/DL (ref 6–23)
CALCIUM SERPL-MCNC: 9.6 MG/DL (ref 8.6–10.3)
CARDIAC TROPONIN I PNL SERPL HS: 6 NG/L (ref 0–13)
CARDIAC TROPONIN I PNL SERPL HS: 9 NG/L (ref 0–13)
CHLORIDE SERPL-SCNC: 95 MMOL/L (ref 98–107)
CO2 SERPL-SCNC: 27 MMOL/L (ref 21–32)
CREAT SERPL-MCNC: 0.66 MG/DL (ref 0.5–1.05)
EGFRCR SERPLBLD CKD-EPI 2021: >90 ML/MIN/1.73M*2
EOSINOPHIL # BLD AUTO: 0.26 X10*3/UL (ref 0–0.7)
EOSINOPHIL NFR BLD AUTO: 4 %
ERYTHROCYTE [DISTWIDTH] IN BLOOD BY AUTOMATED COUNT: 13.2 % (ref 11.5–14.5)
ERYTHROCYTE [DISTWIDTH] IN BLOOD BY AUTOMATED COUNT: 13.2 % (ref 11.5–14.5)
ETHANOL SERPL-MCNC: 47 MG/DL
GLUCOSE SERPL-MCNC: 91 MG/DL (ref 74–99)
HCT VFR BLD AUTO: 40.4 % (ref 36–46)
HCT VFR BLD AUTO: 42.6 % (ref 36–46)
HGB BLD-MCNC: 14.2 G/DL (ref 12–16)
HGB BLD-MCNC: 15.1 G/DL (ref 12–16)
IMM GRANULOCYTES # BLD AUTO: 0.02 X10*3/UL (ref 0–0.7)
IMM GRANULOCYTES NFR BLD AUTO: 0.3 % (ref 0–0.9)
INR PPP: 1 (ref 0.9–1.1)
LACTATE SERPL-SCNC: 0.6 MMOL/L (ref 0.4–2)
LACTATE SERPL-SCNC: 0.7 MMOL/L (ref 0.4–2)
LYMPHOCYTES # BLD AUTO: 2.17 X10*3/UL (ref 1.2–4.8)
LYMPHOCYTES NFR BLD AUTO: 33.1 %
MCH RBC QN AUTO: 30.6 PG (ref 26–34)
MCH RBC QN AUTO: 30.7 PG (ref 26–34)
MCHC RBC AUTO-ENTMCNC: 35.1 G/DL (ref 32–36)
MCHC RBC AUTO-ENTMCNC: 35.4 G/DL (ref 32–36)
MCV RBC AUTO: 86 FL (ref 80–100)
MCV RBC AUTO: 87 FL (ref 80–100)
MONOCYTES # BLD AUTO: 0.74 X10*3/UL (ref 0.1–1)
MONOCYTES NFR BLD AUTO: 11.3 %
NEUTROPHILS # BLD AUTO: 3.35 X10*3/UL (ref 1.2–7.7)
NEUTROPHILS NFR BLD AUTO: 51 %
NRBC BLD-RTO: 0 /100 WBCS (ref 0–0)
NRBC BLD-RTO: 0 /100 WBCS (ref 0–0)
PLATELET # BLD AUTO: 255 X10*3/UL (ref 150–450)
PLATELET # BLD AUTO: 279 X10*3/UL (ref 150–450)
POTASSIUM SERPL-SCNC: 3.9 MMOL/L (ref 3.5–5.3)
PROT SERPL-MCNC: 7.1 G/DL (ref 6.4–8.2)
PROTHROMBIN TIME: 11.1 SECONDS (ref 9.8–12.8)
RBC # BLD AUTO: 4.63 X10*6/UL (ref 4–5.2)
RBC # BLD AUTO: 4.93 X10*6/UL (ref 4–5.2)
RH FACTOR (ANTIGEN D): NORMAL
SODIUM SERPL-SCNC: 129 MMOL/L (ref 136–145)
WBC # BLD AUTO: 6.6 X10*3/UL (ref 4.4–11.3)
WBC # BLD AUTO: 7.1 X10*3/UL (ref 4.4–11.3)

## 2024-04-30 PROCEDURE — 96375 TX/PRO/DX INJ NEW DRUG ADDON: CPT

## 2024-04-30 PROCEDURE — 86901 BLOOD TYPING SEROLOGIC RH(D): CPT | Performed by: STUDENT IN AN ORGANIZED HEALTH CARE EDUCATION/TRAINING PROGRAM

## 2024-04-30 PROCEDURE — 70450 CT HEAD/BRAIN W/O DYE: CPT | Performed by: RADIOLOGY

## 2024-04-30 PROCEDURE — 70450 CT HEAD/BRAIN W/O DYE: CPT

## 2024-04-30 PROCEDURE — 2500000004 HC RX 250 GENERAL PHARMACY W/ HCPCS (ALT 636 FOR OP/ED): Performed by: STUDENT IN AN ORGANIZED HEALTH CARE EDUCATION/TRAINING PROGRAM

## 2024-04-30 PROCEDURE — 99285 EMERGENCY DEPT VISIT HI MDM: CPT | Mod: 25

## 2024-04-30 PROCEDURE — 80053 COMPREHEN METABOLIC PANEL: CPT | Performed by: STUDENT IN AN ORGANIZED HEALTH CARE EDUCATION/TRAINING PROGRAM

## 2024-04-30 PROCEDURE — 72170 X-RAY EXAM OF PELVIS: CPT | Performed by: RADIOLOGY

## 2024-04-30 PROCEDURE — G0390 TRAUMA RESPONS W/HOSP CRITI: HCPCS

## 2024-04-30 PROCEDURE — 2500000004 HC RX 250 GENERAL PHARMACY W/ HCPCS (ALT 636 FOR OP/ED): Performed by: EMERGENCY MEDICINE

## 2024-04-30 PROCEDURE — 84484 ASSAY OF TROPONIN QUANT: CPT | Performed by: STUDENT IN AN ORGANIZED HEALTH CARE EDUCATION/TRAINING PROGRAM

## 2024-04-30 PROCEDURE — 99285 EMERGENCY DEPT VISIT HI MDM: CPT | Performed by: EMERGENCY MEDICINE

## 2024-04-30 PROCEDURE — 74177 CT ABD & PELVIS W/CONTRAST: CPT

## 2024-04-30 PROCEDURE — 2500000004 HC RX 250 GENERAL PHARMACY W/ HCPCS (ALT 636 FOR OP/ED)

## 2024-04-30 PROCEDURE — 99291 CRITICAL CARE FIRST HOUR: CPT | Mod: 25 | Performed by: STUDENT IN AN ORGANIZED HEALTH CARE EDUCATION/TRAINING PROGRAM

## 2024-04-30 PROCEDURE — 83605 ASSAY OF LACTIC ACID: CPT | Performed by: STUDENT IN AN ORGANIZED HEALTH CARE EDUCATION/TRAINING PROGRAM

## 2024-04-30 PROCEDURE — 72131 CT LUMBAR SPINE W/O DYE: CPT | Performed by: RADIOLOGY

## 2024-04-30 PROCEDURE — 71045 X-RAY EXAM CHEST 1 VIEW: CPT | Performed by: RADIOLOGY

## 2024-04-30 PROCEDURE — 99223 1ST HOSP IP/OBS HIGH 75: CPT | Performed by: SURGERY

## 2024-04-30 PROCEDURE — 72128 CT CHEST SPINE W/O DYE: CPT | Performed by: RADIOLOGY

## 2024-04-30 PROCEDURE — 2550000001 HC RX 255 CONTRASTS: Performed by: STUDENT IN AN ORGANIZED HEALTH CARE EDUCATION/TRAINING PROGRAM

## 2024-04-30 PROCEDURE — 93005 ELECTROCARDIOGRAM TRACING: CPT

## 2024-04-30 PROCEDURE — 72100 X-RAY EXAM L-S SPINE 2/3 VWS: CPT | Performed by: STUDENT IN AN ORGANIZED HEALTH CARE EDUCATION/TRAINING PROGRAM

## 2024-04-30 PROCEDURE — 72072 X-RAY EXAM THORAC SPINE 3VWS: CPT

## 2024-04-30 PROCEDURE — 36415 COLL VENOUS BLD VENIPUNCTURE: CPT | Performed by: STUDENT IN AN ORGANIZED HEALTH CARE EDUCATION/TRAINING PROGRAM

## 2024-04-30 PROCEDURE — 83605 ASSAY OF LACTIC ACID: CPT

## 2024-04-30 PROCEDURE — 85025 COMPLETE CBC W/AUTO DIFF WBC: CPT | Performed by: STUDENT IN AN ORGANIZED HEALTH CARE EDUCATION/TRAINING PROGRAM

## 2024-04-30 PROCEDURE — 36415 COLL VENOUS BLD VENIPUNCTURE: CPT

## 2024-04-30 PROCEDURE — 96365 THER/PROPH/DIAG IV INF INIT: CPT

## 2024-04-30 PROCEDURE — 85610 PROTHROMBIN TIME: CPT | Performed by: STUDENT IN AN ORGANIZED HEALTH CARE EDUCATION/TRAINING PROGRAM

## 2024-04-30 PROCEDURE — 99221 1ST HOSP IP/OBS SF/LOW 40: CPT | Performed by: ORTHOPAEDIC SURGERY

## 2024-04-30 PROCEDURE — 96374 THER/PROPH/DIAG INJ IV PUSH: CPT

## 2024-04-30 PROCEDURE — 71045 X-RAY EXAM CHEST 1 VIEW: CPT

## 2024-04-30 PROCEDURE — 85027 COMPLETE CBC AUTOMATED: CPT | Mod: 59

## 2024-04-30 PROCEDURE — 72100 X-RAY EXAM L-S SPINE 2/3 VWS: CPT

## 2024-04-30 PROCEDURE — 72131 CT LUMBAR SPINE W/O DYE: CPT

## 2024-04-30 PROCEDURE — 82077 ASSAY SPEC XCP UR&BREATH IA: CPT | Performed by: STUDENT IN AN ORGANIZED HEALTH CARE EDUCATION/TRAINING PROGRAM

## 2024-04-30 PROCEDURE — 72125 CT NECK SPINE W/O DYE: CPT | Performed by: RADIOLOGY

## 2024-04-30 PROCEDURE — 72072 X-RAY EXAM THORAC SPINE 3VWS: CPT | Performed by: STUDENT IN AN ORGANIZED HEALTH CARE EDUCATION/TRAINING PROGRAM

## 2024-04-30 PROCEDURE — 74177 CT ABD & PELVIS W/CONTRAST: CPT | Performed by: RADIOLOGY

## 2024-04-30 PROCEDURE — 72128 CT CHEST SPINE W/O DYE: CPT

## 2024-04-30 PROCEDURE — 72125 CT NECK SPINE W/O DYE: CPT

## 2024-04-30 PROCEDURE — 72170 X-RAY EXAM OF PELVIS: CPT

## 2024-04-30 PROCEDURE — 71260 CT THORAX DX C+: CPT | Performed by: RADIOLOGY

## 2024-04-30 RX ORDER — ONDANSETRON HYDROCHLORIDE 2 MG/ML
INJECTION, SOLUTION INTRAVENOUS
Status: COMPLETED
Start: 2024-04-30 | End: 2024-04-30

## 2024-04-30 RX ORDER — ONDANSETRON HYDROCHLORIDE 2 MG/ML
4 INJECTION, SOLUTION INTRAVENOUS ONCE
Status: COMPLETED | OUTPATIENT
Start: 2024-04-30 | End: 2024-04-30

## 2024-04-30 RX ORDER — SODIUM CHLORIDE, SODIUM LACTATE, POTASSIUM CHLORIDE, CALCIUM CHLORIDE 600; 310; 30; 20 MG/100ML; MG/100ML; MG/100ML; MG/100ML
75 INJECTION, SOLUTION INTRAVENOUS CONTINUOUS
Status: DISCONTINUED | OUTPATIENT
Start: 2024-04-30 | End: 2024-05-01

## 2024-04-30 RX ORDER — UBIDECARENONE 75 MG
500 CAPSULE ORAL DAILY
COMMUNITY

## 2024-04-30 RX ORDER — HYDRALAZINE HYDROCHLORIDE 20 MG/ML
10 INJECTION INTRAMUSCULAR; INTRAVENOUS ONCE
Status: COMPLETED | OUTPATIENT
Start: 2024-04-30 | End: 2024-04-30

## 2024-04-30 RX ORDER — HYDROMORPHONE HYDROCHLORIDE 1 MG/ML
0.5 INJECTION, SOLUTION INTRAMUSCULAR; INTRAVENOUS; SUBCUTANEOUS ONCE
Status: COMPLETED | OUTPATIENT
Start: 2024-04-30 | End: 2024-04-30

## 2024-04-30 RX ORDER — TRIAMCINOLONE ACETONIDE 5 MG/G
1 CREAM TOPICAL AS NEEDED
COMMUNITY

## 2024-04-30 RX ADMIN — ONDANSETRON 4 MG: 2 INJECTION INTRAMUSCULAR; INTRAVENOUS at 22:15

## 2024-04-30 RX ADMIN — PIPERACILLIN SODIUM AND TAZOBACTAM SODIUM 3.38 G: 3; .375 INJECTION, SOLUTION INTRAVENOUS at 16:53

## 2024-04-30 RX ADMIN — HYDROMORPHONE HYDROCHLORIDE 0.5 MG: 1 INJECTION, SOLUTION INTRAMUSCULAR; INTRAVENOUS; SUBCUTANEOUS at 18:00

## 2024-04-30 RX ADMIN — SODIUM CHLORIDE, POTASSIUM CHLORIDE, SODIUM LACTATE AND CALCIUM CHLORIDE 75 ML/HR: 600; 310; 30; 20 INJECTION, SOLUTION INTRAVENOUS at 23:31

## 2024-04-30 RX ADMIN — ONDANSETRON HYDROCHLORIDE 4 MG: 2 INJECTION, SOLUTION INTRAVENOUS at 22:15

## 2024-04-30 RX ADMIN — IOHEXOL 100 ML: 350 INJECTION, SOLUTION INTRAVENOUS at 13:04

## 2024-04-30 RX ADMIN — HYDRALAZINE HYDROCHLORIDE 10 MG: 20 INJECTION INTRAMUSCULAR; INTRAVENOUS at 16:53

## 2024-04-30 RX ADMIN — HYDROMORPHONE HYDROCHLORIDE 0.5 MG: 1 INJECTION, SOLUTION INTRAMUSCULAR; INTRAVENOUS; SUBCUTANEOUS at 21:58

## 2024-04-30 RX ADMIN — SODIUM CHLORIDE, POTASSIUM CHLORIDE, SODIUM LACTATE AND CALCIUM CHLORIDE 1000 ML: 600; 310; 30; 20 INJECTION, SOLUTION INTRAVENOUS at 23:31

## 2024-04-30 ASSESSMENT — LIFESTYLE VARIABLES
EVER HAD A DRINK FIRST THING IN THE MORNING TO STEADY YOUR NERVES TO GET RID OF A HANGOVER: NO
HAVE YOU EVER FELT YOU SHOULD CUT DOWN ON YOUR DRINKING: NO
TOTAL SCORE: 0
TOTAL SCORE: 0
HAVE PEOPLE ANNOYED YOU BY CRITICIZING YOUR DRINKING: NO
HAVE YOU EVER FELT YOU SHOULD CUT DOWN ON YOUR DRINKING: NO
EVER HAD A DRINK FIRST THING IN THE MORNING TO STEADY YOUR NERVES TO GET RID OF A HANGOVER: NO
EVER FELT BAD OR GUILTY ABOUT YOUR DRINKING: NO
HAVE PEOPLE ANNOYED YOU BY CRITICIZING YOUR DRINKING: NO
EVER FELT BAD OR GUILTY ABOUT YOUR DRINKING: NO

## 2024-04-30 ASSESSMENT — PAIN - FUNCTIONAL ASSESSMENT: PAIN_FUNCTIONAL_ASSESSMENT: 0-10

## 2024-04-30 ASSESSMENT — PAIN SCALES - GENERAL
PAINLEVEL_OUTOF10: 9
PAINLEVEL_OUTOF10: 6
PAINLEVEL_OUTOF10: 10 - WORST POSSIBLE PAIN
PAINLEVEL_OUTOF10: 9

## 2024-04-30 ASSESSMENT — ENCOUNTER SYMPTOMS
NUMBNESS: 0
DIZZINESS: 0
ALLERGIC/IMMUNOLOGIC NEGATIVE: 1
LIGHT-HEADEDNESS: 0
WEAKNESS: 0
CARDIOVASCULAR NEGATIVE: 1
HEADACHES: 0
SHORTNESS OF BREATH: 1
ENDOCRINE NEGATIVE: 1
EYES NEGATIVE: 1
BACK PAIN: 1
CONSTITUTIONAL NEGATIVE: 1

## 2024-04-30 ASSESSMENT — COLUMBIA-SUICIDE SEVERITY RATING SCALE - C-SSRS
1. IN THE PAST MONTH, HAVE YOU WISHED YOU WERE DEAD OR WISHED YOU COULD GO TO SLEEP AND NOT WAKE UP?: NO
2. HAVE YOU ACTUALLY HAD ANY THOUGHTS OF KILLING YOURSELF?: NO
6. HAVE YOU EVER DONE ANYTHING, STARTED TO DO ANYTHING, OR PREPARED TO DO ANYTHING TO END YOUR LIFE?: NO
1. IN THE PAST MONTH, HAVE YOU WISHED YOU WERE DEAD OR WISHED YOU COULD GO TO SLEEP AND NOT WAKE UP?: NO
6. HAVE YOU EVER DONE ANYTHING, STARTED TO DO ANYTHING, OR PREPARED TO DO ANYTHING TO END YOUR LIFE?: NO
2. HAVE YOU ACTUALLY HAD ANY THOUGHTS OF KILLING YOURSELF?: NO

## 2024-04-30 ASSESSMENT — PAIN DESCRIPTION - PAIN TYPE: TYPE: ACUTE PAIN
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
## 2024-04-30 NOTE — PROGRESS NOTES
Pharmacy Medication History Review    Tuyet Ramirez is a 68 y.o. female admitted for No Principal Problem: There is no principal problem currently on the Problem List. Please update the Problem List and refresh.. Pharmacy reviewed the patient's yzuce-ip-epwifohvi medications and allergies for accuracy.    The list below reflectives the updated PTA list. Please review each medication in order reconciliation for additional clarification and justification.  Prior to Admission medications    Medication Sig Start Date End Date Taking? Authorizing Provider   triamcinolone (Kenalog) 0.5 % cream Apply 1 Application topically if needed.   Yes Historical Provider, MD   carvedilol (Coreg) 12.5 mg tablet Take 1 tablet (12.5 mg) by mouth 2 times a day.    Historical Provider, MD   cyanocobalamin (Vitamin B-12) 500 mcg tablet Take 1 tablet (500 mcg) by mouth once daily.    Historical Provider, MD   hydrocortisone 1 % ointment Apply 1 Application topically if needed. 9/1/23   Historical Provider, MD   multivitamin tablet Take 1 tablet by mouth once daily.    Historical Provider, MD   omeprazole (PriLOSEC) 40 mg DR capsule TAKE 1 TABLET BY MOUTH 30-60 MINUTES BEFORE BREAKFAST. 10/31/23   Tati Marx, APRN-CNP   rosuvastatin (Crestor) 10 mg tablet Take 1 tablet (10 mg) by mouth once daily.    Historical Provider, MD   sertraline (Zoloft) 50 mg tablet Take 1 tablet (50 mg) by mouth once daily.    Historical Provider, MD        The list below reflectives the updated allergy list. Please review each documented allergy for additional clarification and justification.  Allergies  Reviewed by Kallie Joshi MD on 4/30/2024   No Known Allergies         Below are additional concerns with the patient's PTA list.      Maricarmen De León

## 2024-05-01 VITALS
DIASTOLIC BLOOD PRESSURE: 57 MMHG | RESPIRATION RATE: 19 BRPM | HEART RATE: 93 BPM | BODY MASS INDEX: 26.11 KG/M2 | TEMPERATURE: 97.9 F | WEIGHT: 133 LBS | OXYGEN SATURATION: 94 % | HEIGHT: 60 IN | SYSTOLIC BLOOD PRESSURE: 112 MMHG

## 2024-05-01 PROBLEM — K66.8 PERITONEAL FREE AIR: Status: ACTIVE | Noted: 2024-05-01

## 2024-05-01 LAB
ERYTHROCYTE [DISTWIDTH] IN BLOOD BY AUTOMATED COUNT: 13.6 % (ref 11.5–14.5)
HCT VFR BLD AUTO: 39.1 % (ref 36–46)
HGB BLD-MCNC: 13.3 G/DL (ref 12–16)
MCH RBC QN AUTO: 29.2 PG (ref 26–34)
MCHC RBC AUTO-ENTMCNC: 34 G/DL (ref 32–36)
MCV RBC AUTO: 86 FL (ref 80–100)
NRBC BLD-RTO: 0 /100 WBCS (ref 0–0)
PLATELET # BLD AUTO: 227 X10*3/UL (ref 150–450)
RBC # BLD AUTO: 4.55 X10*6/UL (ref 4–5.2)
WBC # BLD AUTO: 6.1 X10*3/UL (ref 4.4–11.3)

## 2024-05-01 PROCEDURE — 2500000006 HC RX 250 W HCPCS SELF ADMINISTERED DRUGS (ALT 637 FOR ALL PAYERS)

## 2024-05-01 PROCEDURE — 2500000001 HC RX 250 WO HCPCS SELF ADMINISTERED DRUGS (ALT 637 FOR MEDICARE OP)

## 2024-05-01 PROCEDURE — 1100000001 HC PRIVATE ROOM DAILY

## 2024-05-01 PROCEDURE — 36415 COLL VENOUS BLD VENIPUNCTURE: CPT

## 2024-05-01 PROCEDURE — 2500000004 HC RX 250 GENERAL PHARMACY W/ HCPCS (ALT 636 FOR OP/ED)

## 2024-05-01 PROCEDURE — 85027 COMPLETE CBC AUTOMATED: CPT

## 2024-05-01 RX ORDER — NALOXONE HYDROCHLORIDE 0.4 MG/ML
0.2 INJECTION, SOLUTION INTRAMUSCULAR; INTRAVENOUS; SUBCUTANEOUS EVERY 5 MIN PRN
Status: DISCONTINUED | OUTPATIENT
Start: 2024-05-01 | End: 2024-05-02 | Stop reason: HOSPADM

## 2024-05-01 RX ORDER — POLYETHYLENE GLYCOL 3350 17 G/17G
17 POWDER, FOR SOLUTION ORAL DAILY
Status: DISCONTINUED | OUTPATIENT
Start: 2024-05-01 | End: 2024-05-02 | Stop reason: HOSPADM

## 2024-05-01 RX ORDER — OXYCODONE HYDROCHLORIDE 5 MG/1
2.5 TABLET ORAL EVERY 6 HOURS PRN
Status: DISCONTINUED | OUTPATIENT
Start: 2024-05-01 | End: 2024-05-02

## 2024-05-01 RX ORDER — ONDANSETRON HYDROCHLORIDE 2 MG/ML
4 INJECTION, SOLUTION INTRAVENOUS EVERY 8 HOURS PRN
Status: DISCONTINUED | OUTPATIENT
Start: 2024-05-01 | End: 2024-05-02 | Stop reason: HOSPADM

## 2024-05-01 RX ORDER — SERTRALINE HYDROCHLORIDE 50 MG/1
50 TABLET, FILM COATED ORAL DAILY
Status: DISCONTINUED | OUTPATIENT
Start: 2024-05-01 | End: 2024-05-02 | Stop reason: HOSPADM

## 2024-05-01 RX ORDER — ACETAMINOPHEN 325 MG/1
975 TABLET ORAL EVERY 6 HOURS SCHEDULED
Status: DISCONTINUED | OUTPATIENT
Start: 2024-05-01 | End: 2024-05-01

## 2024-05-01 RX ORDER — ACETAMINOPHEN 325 MG/1
975 TABLET ORAL EVERY 8 HOURS
Status: DISCONTINUED | OUTPATIENT
Start: 2024-05-01 | End: 2024-05-02 | Stop reason: HOSPADM

## 2024-05-01 RX ORDER — LANOLIN ALCOHOL/MO/W.PET/CERES
500 CREAM (GRAM) TOPICAL DAILY
Status: DISCONTINUED | OUTPATIENT
Start: 2024-05-01 | End: 2024-05-02 | Stop reason: HOSPADM

## 2024-05-01 RX ORDER — MULTIVIT-MIN/IRON FUM/FOLIC AC 7.5 MG-4
1 TABLET ORAL DAILY
Status: DISCONTINUED | OUTPATIENT
Start: 2024-05-01 | End: 2024-05-02 | Stop reason: HOSPADM

## 2024-05-01 RX ORDER — ENOXAPARIN SODIUM 100 MG/ML
30 INJECTION SUBCUTANEOUS EVERY 12 HOURS
Status: DISCONTINUED | OUTPATIENT
Start: 2024-05-01 | End: 2024-05-02 | Stop reason: HOSPADM

## 2024-05-01 RX ORDER — SODIUM CHLORIDE, SODIUM LACTATE, POTASSIUM CHLORIDE, CALCIUM CHLORIDE 600; 310; 30; 20 MG/100ML; MG/100ML; MG/100ML; MG/100ML
50 INJECTION, SOLUTION INTRAVENOUS CONTINUOUS
Status: DISCONTINUED | OUTPATIENT
Start: 2024-05-01 | End: 2024-05-01

## 2024-05-01 RX ORDER — OXYCODONE HYDROCHLORIDE 5 MG/1
5 TABLET ORAL EVERY 4 HOURS PRN
Status: DISCONTINUED | OUTPATIENT
Start: 2024-05-01 | End: 2024-05-02 | Stop reason: HOSPADM

## 2024-05-01 RX ORDER — PANTOPRAZOLE SODIUM 40 MG/1
40 TABLET, DELAYED RELEASE ORAL
Status: DISCONTINUED | OUTPATIENT
Start: 2024-05-02 | End: 2024-05-02 | Stop reason: HOSPADM

## 2024-05-01 RX ORDER — ONDANSETRON 4 MG/1
4 TABLET, FILM COATED ORAL EVERY 8 HOURS PRN
Status: DISCONTINUED | OUTPATIENT
Start: 2024-05-01 | End: 2024-05-02 | Stop reason: HOSPADM

## 2024-05-01 RX ADMIN — ACETAMINOPHEN 975 MG: 325 TABLET ORAL at 21:02

## 2024-05-01 RX ADMIN — SERTRALINE 50 MG: 50 TABLET, FILM COATED ORAL at 12:03

## 2024-05-01 RX ADMIN — OXYCODONE HYDROCHLORIDE 5 MG: 5 TABLET ORAL at 14:24

## 2024-05-01 RX ADMIN — ENOXAPARIN SODIUM 30 MG: 100 INJECTION SUBCUTANEOUS at 21:05

## 2024-05-01 RX ADMIN — ACETAMINOPHEN 975 MG: 325 TABLET ORAL at 13:38

## 2024-05-01 RX ADMIN — OXYCODONE HYDROCHLORIDE 5 MG: 5 TABLET ORAL at 03:29

## 2024-05-01 RX ADMIN — CYANOCOBALAMIN TAB 1000 MCG 500 MCG: 1000 TAB at 12:03

## 2024-05-01 RX ADMIN — OXYCODONE HYDROCHLORIDE 5 MG: 5 TABLET ORAL at 09:38

## 2024-05-01 RX ADMIN — OXYCODONE HYDROCHLORIDE 5 MG: 5 TABLET ORAL at 19:55

## 2024-05-01 SDOH — SOCIAL STABILITY: SOCIAL NETWORK: HOW OFTEN DO YOU ATTEND CHURCH OR RELIGIOUS SERVICES?: PATIENT DECLINED

## 2024-05-01 SDOH — SOCIAL STABILITY: SOCIAL INSECURITY: ABUSE: ADULT

## 2024-05-01 SDOH — SOCIAL STABILITY: SOCIAL INSECURITY: ARE THERE ANY APPARENT SIGNS OF INJURIES/BEHAVIORS THAT COULD BE RELATED TO ABUSE/NEGLECT?: NO

## 2024-05-01 SDOH — ECONOMIC STABILITY: INCOME INSECURITY
IN THE PAST 12 MONTHS, HAS THE ELECTRIC, GAS, OIL, OR WATER COMPANY THREATENED TO SHUT OFF SERVICE IN YOUR HOME?: PATIENT DECLINED

## 2024-05-01 SDOH — SOCIAL STABILITY: SOCIAL INSECURITY: WITHIN THE LAST YEAR, HAVE YOU BEEN AFRAID OF YOUR PARTNER OR EX-PARTNER?: PATIENT DECLINED

## 2024-05-01 SDOH — ECONOMIC STABILITY: FOOD INSECURITY: WITHIN THE PAST 12 MONTHS, THE FOOD YOU BOUGHT JUST DIDN'T LAST AND YOU DIDN'T HAVE MONEY TO GET MORE.: PATIENT DECLINED

## 2024-05-01 SDOH — SOCIAL STABILITY: SOCIAL INSECURITY: HAVE YOU HAD ANY THOUGHTS OF HARMING ANYONE ELSE?: NO

## 2024-05-01 SDOH — SOCIAL STABILITY: SOCIAL NETWORK: HOW OFTEN DO YOU GET TOGETHER WITH FRIENDS OR RELATIVES?: PATIENT DECLINED

## 2024-05-01 SDOH — HEALTH STABILITY: MENTAL HEALTH
HOW OFTEN DO YOU NEED TO HAVE SOMEONE HELP YOU WHEN YOU READ INSTRUCTIONS, PAMPHLETS, OR OTHER WRITTEN MATERIAL FROM YOUR DOCTOR OR PHARMACY?: PATIENT DECLINES TO RESPOND

## 2024-05-01 SDOH — ECONOMIC STABILITY: INCOME INSECURITY: IN THE LAST 12 MONTHS, WAS THERE A TIME WHEN YOU WERE NOT ABLE TO PAY THE MORTGAGE OR RENT ON TIME?: PATIENT DECLINED

## 2024-05-01 SDOH — ECONOMIC STABILITY: TRANSPORTATION INSECURITY
IN THE PAST 12 MONTHS, HAS THE LACK OF TRANSPORTATION KEPT YOU FROM MEDICAL APPOINTMENTS OR FROM GETTING MEDICATIONS?: PATIENT DECLINED

## 2024-05-01 SDOH — ECONOMIC STABILITY: FOOD INSECURITY: WITHIN THE PAST 12 MONTHS, YOU WORRIED THAT YOUR FOOD WOULD RUN OUT BEFORE YOU GOT MONEY TO BUY MORE.: PATIENT DECLINED

## 2024-05-01 SDOH — HEALTH STABILITY: MENTAL HEALTH: HOW OFTEN DO YOU HAVE A DRINK CONTAINING ALCOHOL?: PATIENT DECLINED

## 2024-05-01 SDOH — HEALTH STABILITY: PHYSICAL HEALTH: ON AVERAGE, HOW MANY MINUTES DO YOU ENGAGE IN EXERCISE AT THIS LEVEL?: PATIENT DECLINED

## 2024-05-01 SDOH — SOCIAL STABILITY: SOCIAL INSECURITY: ARE YOU OR HAVE YOU BEEN THREATENED OR ABUSED PHYSICALLY, EMOTIONALLY, OR SEXUALLY BY ANYONE?: NO

## 2024-05-01 SDOH — SOCIAL STABILITY: SOCIAL INSECURITY
WITHIN THE LAST YEAR, HAVE TO BEEN RAPED OR FORCED TO HAVE ANY KIND OF SEXUAL ACTIVITY BY YOUR PARTNER OR EX-PARTNER?: PATIENT DECLINED

## 2024-05-01 SDOH — SOCIAL STABILITY: SOCIAL INSECURITY
WITHIN THE LAST YEAR, HAVE YOU BEEN HUMILIATED OR EMOTIONALLY ABUSED IN OTHER WAYS BY YOUR PARTNER OR EX-PARTNER?: PATIENT DECLINED

## 2024-05-01 SDOH — SOCIAL STABILITY: SOCIAL INSECURITY: DO YOU FEEL ANYONE HAS EXPLOITED OR TAKEN ADVANTAGE OF YOU FINANCIALLY OR OF YOUR PERSONAL PROPERTY?: NO

## 2024-05-01 SDOH — SOCIAL STABILITY: SOCIAL INSECURITY: DOES ANYONE TRY TO KEEP YOU FROM HAVING/CONTACTING OTHER FRIENDS OR DOING THINGS OUTSIDE YOUR HOME?: NO

## 2024-05-01 SDOH — SOCIAL STABILITY: SOCIAL INSECURITY: HAVE YOU HAD THOUGHTS OF HARMING ANYONE ELSE?: NO

## 2024-05-01 SDOH — ECONOMIC STABILITY: TRANSPORTATION INSECURITY
IN THE PAST 12 MONTHS, HAS LACK OF TRANSPORTATION KEPT YOU FROM MEETINGS, WORK, OR FROM GETTING THINGS NEEDED FOR DAILY LIVING?: PATIENT DECLINED

## 2024-05-01 SDOH — SOCIAL STABILITY: SOCIAL NETWORK
DO YOU BELONG TO ANY CLUBS OR ORGANIZATIONS SUCH AS CHURCH GROUPS UNIONS, FRATERNAL OR ATHLETIC GROUPS, OR SCHOOL GROUPS?: PATIENT DECLINED

## 2024-05-01 SDOH — HEALTH STABILITY: MENTAL HEALTH: HOW OFTEN DO YOU HAVE 6 OR MORE DRINKS ON ONE OCCASION?: PATIENT DECLINED

## 2024-05-01 SDOH — ECONOMIC STABILITY: HOUSING INSECURITY
IN THE LAST 12 MONTHS, WAS THERE A TIME WHEN YOU DID NOT HAVE A STEADY PLACE TO SLEEP OR SLEPT IN A SHELTER (INCLUDING NOW)?: PATIENT DECLINED

## 2024-05-01 SDOH — HEALTH STABILITY: PHYSICAL HEALTH
ON AVERAGE, HOW MANY DAYS PER WEEK DO YOU ENGAGE IN MODERATE TO STRENUOUS EXERCISE (LIKE A BRISK WALK)?: PATIENT DECLINED

## 2024-05-01 SDOH — HEALTH STABILITY: MENTAL HEALTH: HOW MANY STANDARD DRINKS CONTAINING ALCOHOL DO YOU HAVE ON A TYPICAL DAY?: PATIENT DECLINED

## 2024-05-01 SDOH — SOCIAL STABILITY: SOCIAL INSECURITY
WITHIN THE LAST YEAR, HAVE YOU BEEN KICKED, HIT, SLAPPED, OR OTHERWISE PHYSICALLY HURT BY YOUR PARTNER OR EX-PARTNER?: PATIENT DECLINED

## 2024-05-01 SDOH — SOCIAL STABILITY: SOCIAL NETWORK: ARE YOU MARRIED, WIDOWED, DIVORCED, SEPARATED, NEVER MARRIED, OR LIVING WITH A PARTNER?: PATIENT DECLINED

## 2024-05-01 SDOH — SOCIAL STABILITY: SOCIAL INSECURITY: HAS ANYONE EVER THREATENED TO HURT YOUR FAMILY OR YOUR PETS?: NO

## 2024-05-01 SDOH — SOCIAL STABILITY: SOCIAL NETWORK: IN A TYPICAL WEEK, HOW MANY TIMES DO YOU TALK ON THE PHONE WITH FAMILY, FRIENDS, OR NEIGHBORS?: PATIENT DECLINED

## 2024-05-01 SDOH — HEALTH STABILITY: MENTAL HEALTH
STRESS IS WHEN SOMEONE FEELS TENSE, NERVOUS, ANXIOUS, OR CAN'T SLEEP AT NIGHT BECAUSE THEIR MIND IS TROUBLED. HOW STRESSED ARE YOU?: PATIENT DECLINED

## 2024-05-01 SDOH — SOCIAL STABILITY: SOCIAL INSECURITY: DO YOU FEEL UNSAFE GOING BACK TO THE PLACE WHERE YOU ARE LIVING?: NO

## 2024-05-01 SDOH — ECONOMIC STABILITY: INCOME INSECURITY: HOW HARD IS IT FOR YOU TO PAY FOR THE VERY BASICS LIKE FOOD, HOUSING, MEDICAL CARE, AND HEATING?: PATIENT DECLINED

## 2024-05-01 SDOH — SOCIAL STABILITY: SOCIAL NETWORK: HOW OFTEN DO YOU ATTENT MEETINGS OF THE CLUB OR ORGANIZATION YOU BELONG TO?: PATIENT DECLINED

## 2024-05-01 ASSESSMENT — PAIN - FUNCTIONAL ASSESSMENT
PAIN_FUNCTIONAL_ASSESSMENT: 0-10

## 2024-05-01 ASSESSMENT — PATIENT HEALTH QUESTIONNAIRE - PHQ9
2. FEELING DOWN, DEPRESSED OR HOPELESS: NOT AT ALL
1. LITTLE INTEREST OR PLEASURE IN DOING THINGS: NOT AT ALL
SUM OF ALL RESPONSES TO PHQ9 QUESTIONS 1 & 2: 0

## 2024-05-01 ASSESSMENT — LIFESTYLE VARIABLES
SKIP TO QUESTIONS 9-10: 0
AUDIT-C TOTAL SCORE: -1
SUBSTANCE_ABUSE_PAST_12_MONTHS: YES
PRESCIPTION_ABUSE_PAST_12_MONTHS: NO
HOW MANY STANDARD DRINKS CONTAINING ALCOHOL DO YOU HAVE ON A TYPICAL DAY: 1 OR 2
AUDIT-C TOTAL SCORE: 2
HOW OFTEN DO YOU HAVE A DRINK CONTAINING ALCOHOL: MONTHLY OR LESS
HOW OFTEN DO YOU HAVE 6 OR MORE DRINKS ON ONE OCCASION: LESS THAN MONTHLY
AUDIT-C TOTAL SCORE: 2
SKIP TO QUESTIONS 9-10: 0

## 2024-05-01 ASSESSMENT — PAIN SCALES - GENERAL
PAINLEVEL_OUTOF10: 8
PAINLEVEL_OUTOF10: 6
PAINLEVEL_OUTOF10: 7
PAINLEVEL_OUTOF10: 8
PAINLEVEL_OUTOF10: 9
PAINLEVEL_OUTOF10: 2
PAINLEVEL_OUTOF10: 8

## 2024-05-01 ASSESSMENT — ACTIVITIES OF DAILY LIVING (ADL)
HEARING - LEFT EAR: FUNCTIONAL
FEEDING YOURSELF: INDEPENDENT
PATIENT'S MEMORY ADEQUATE TO SAFELY COMPLETE DAILY ACTIVITIES?: YES
LACK_OF_TRANSPORTATION: NO
ASSISTIVE_DEVICE: WALKER
WALKS IN HOME: INDEPENDENT
TOILETING: INDEPENDENT
ADEQUATE_TO_COMPLETE_ADL: YES
BATHING: INDEPENDENT
GROOMING: INDEPENDENT
HEARING - RIGHT EAR: FUNCTIONAL
JUDGMENT_ADEQUATE_SAFELY_COMPLETE_DAILY_ACTIVITIES: YES
DRESSING YOURSELF: INDEPENDENT

## 2024-05-01 ASSESSMENT — COGNITIVE AND FUNCTIONAL STATUS - GENERAL
DAILY ACTIVITIY SCORE: 24
MOBILITY SCORE: 24
DAILY ACTIVITIY SCORE: 24
PATIENT BASELINE BEDBOUND: NO
MOBILITY SCORE: 24

## 2024-05-01 ASSESSMENT — PAIN DESCRIPTION - PROGRESSION
CLINICAL_PROGRESSION: NOT CHANGED
CLINICAL_PROGRESSION: GRADUALLY IMPROVING

## 2024-05-01 ASSESSMENT — PAIN DESCRIPTION - LOCATION
LOCATION: CHEST
LOCATION: BACK
LOCATION: BACK

## 2024-05-01 ASSESSMENT — PAIN DESCRIPTION - PAIN TYPE: TYPE: ACUTE PAIN
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
## 2024-05-02 VITALS
DIASTOLIC BLOOD PRESSURE: 64 MMHG | BODY MASS INDEX: 26.11 KG/M2 | SYSTOLIC BLOOD PRESSURE: 100 MMHG | WEIGHT: 133 LBS | RESPIRATION RATE: 16 BRPM | TEMPERATURE: 98.1 F | HEART RATE: 83 BPM | HEIGHT: 60 IN | OXYGEN SATURATION: 94 %

## 2024-05-02 PROBLEM — S22.080D COMPRESSION FRACTURE OF T11 VERTEBRA WITH ROUTINE HEALING: Status: ACTIVE | Noted: 2024-05-02

## 2024-05-02 PROBLEM — K66.8 PERITONEAL FREE AIR: Status: RESOLVED | Noted: 2024-05-01 | Resolved: 2024-05-02

## 2024-05-02 PROBLEM — S32.029A CLOSED FRACTURE OF SECOND LUMBAR VERTEBRA (MULTI): Status: ACTIVE | Noted: 2024-05-02
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
## 2024-05-02 LAB
ALBUMIN SERPL BCP-MCNC: 3.7 G/DL (ref 3.4–5)
ANION GAP SERPL CALC-SCNC: 14 MMOL/L (ref 10–20)
BUN SERPL-MCNC: 7 MG/DL (ref 6–23)
CALCIUM SERPL-MCNC: 9 MG/DL (ref 8.6–10.6)
CHLORIDE SERPL-SCNC: 97 MMOL/L (ref 98–107)
CO2 SERPL-SCNC: 25 MMOL/L (ref 21–32)
CREAT SERPL-MCNC: 0.59 MG/DL (ref 0.5–1.05)
EGFRCR SERPLBLD CKD-EPI 2021: >90 ML/MIN/1.73M*2
ERYTHROCYTE [DISTWIDTH] IN BLOOD BY AUTOMATED COUNT: 13.4 % (ref 11.5–14.5)
GLUCOSE SERPL-MCNC: 121 MG/DL (ref 74–99)
HCT VFR BLD AUTO: 36.3 % (ref 36–46)
HGB BLD-MCNC: 12.3 G/DL (ref 12–16)
MAGNESIUM SERPL-MCNC: 1.72 MG/DL (ref 1.6–2.4)
MCH RBC QN AUTO: 29.6 PG (ref 26–34)
MCHC RBC AUTO-ENTMCNC: 33.9 G/DL (ref 32–36)
MCV RBC AUTO: 87 FL (ref 80–100)
NRBC BLD-RTO: 0 /100 WBCS (ref 0–0)
PHOSPHATE SERPL-MCNC: 3.8 MG/DL (ref 2.5–4.9)
PLATELET # BLD AUTO: 248 X10*3/UL (ref 150–450)
POTASSIUM SERPL-SCNC: 3.9 MMOL/L (ref 3.5–5.3)
RBC # BLD AUTO: 4.16 X10*6/UL (ref 4–5.2)
SODIUM SERPL-SCNC: 132 MMOL/L (ref 136–145)
WBC # BLD AUTO: 6.4 X10*3/UL (ref 4.4–11.3)

## 2024-05-02 PROCEDURE — 2500000001 HC RX 250 WO HCPCS SELF ADMINISTERED DRUGS (ALT 637 FOR MEDICARE OP)

## 2024-05-02 PROCEDURE — 83735 ASSAY OF MAGNESIUM: CPT

## 2024-05-02 PROCEDURE — 97530 THERAPEUTIC ACTIVITIES: CPT | Mod: GO

## 2024-05-02 PROCEDURE — 2500000004 HC RX 250 GENERAL PHARMACY W/ HCPCS (ALT 636 FOR OP/ED)

## 2024-05-02 PROCEDURE — 36415 COLL VENOUS BLD VENIPUNCTURE: CPT

## 2024-05-02 PROCEDURE — 80069 RENAL FUNCTION PANEL: CPT

## 2024-05-02 PROCEDURE — 97161 PT EVAL LOW COMPLEX 20 MIN: CPT | Mod: GP | Performed by: PHYSICAL THERAPIST

## 2024-05-02 PROCEDURE — 2500000006 HC RX 250 W HCPCS SELF ADMINISTERED DRUGS (ALT 637 FOR ALL PAYERS): Mod: MUE

## 2024-05-02 PROCEDURE — 85027 COMPLETE CBC AUTOMATED: CPT

## 2024-05-02 PROCEDURE — 97165 OT EVAL LOW COMPLEX 30 MIN: CPT | Mod: GO

## 2024-05-02 RX ORDER — NALOXONE HYDROCHLORIDE 4 MG/.1ML
4 SPRAY NASAL AS NEEDED
Qty: 2 EACH | Refills: 1 | Status: SHIPPED | OUTPATIENT
Start: 2024-05-02

## 2024-05-02 RX ORDER — OXYCODONE HYDROCHLORIDE 5 MG/1
5 TABLET ORAL EVERY 6 HOURS PRN
Qty: 15 TABLET | Refills: 0 | Status: SHIPPED | OUTPATIENT
Start: 2024-05-02 | End: 2024-05-07

## 2024-05-02 RX ORDER — POLYETHYLENE GLYCOL 3350 17 G/17G
17 POWDER, FOR SOLUTION ORAL DAILY
Qty: 7 PACKET | Refills: 0 | Status: SHIPPED | OUTPATIENT
Start: 2024-05-03 | End: 2024-05-10

## 2024-05-02 RX ORDER — HYDROMORPHONE HYDROCHLORIDE 1 MG/ML
0.2 INJECTION, SOLUTION INTRAMUSCULAR; INTRAVENOUS; SUBCUTANEOUS EVERY 2 HOUR PRN
Status: DISCONTINUED | OUTPATIENT
Start: 2024-05-02 | End: 2024-05-02 | Stop reason: HOSPADM

## 2024-05-02 RX ORDER — OXYCODONE HYDROCHLORIDE 5 MG/1
2.5 TABLET ORAL EVERY 4 HOURS PRN
Status: DISCONTINUED | OUTPATIENT
Start: 2024-05-02 | End: 2024-05-02 | Stop reason: HOSPADM

## 2024-05-02 RX ORDER — ACETAMINOPHEN 325 MG/1
975 TABLET ORAL EVERY 8 HOURS PRN
Qty: 90 TABLET | Refills: 0 | Status: SHIPPED | OUTPATIENT
Start: 2024-05-02 | End: 2024-05-12

## 2024-05-02 RX ORDER — AMOXICILLIN 250 MG
2 CAPSULE ORAL 2 TIMES DAILY
Qty: 28 TABLET | Refills: 0 | Status: SHIPPED | OUTPATIENT
Start: 2024-05-02 | End: 2024-05-09

## 2024-05-02 RX ADMIN — OXYCODONE HYDROCHLORIDE 5 MG: 5 TABLET ORAL at 00:15

## 2024-05-02 RX ADMIN — Medication 1 TABLET: at 08:29

## 2024-05-02 RX ADMIN — ONDANSETRON 4 MG: 2 INJECTION INTRAMUSCULAR; INTRAVENOUS at 00:56

## 2024-05-02 RX ADMIN — OXYCODONE HYDROCHLORIDE 5 MG: 5 TABLET ORAL at 11:19

## 2024-05-02 RX ADMIN — PANTOPRAZOLE SODIUM 40 MG: 40 TABLET, DELAYED RELEASE ORAL at 05:36

## 2024-05-02 RX ADMIN — OXYCODONE HYDROCHLORIDE 5 MG: 5 TABLET ORAL at 05:35

## 2024-05-02 RX ADMIN — CYANOCOBALAMIN TAB 1000 MCG 500 MCG: 1000 TAB at 08:29

## 2024-05-02 RX ADMIN — ACETAMINOPHEN 975 MG: 325 TABLET ORAL at 05:35

## 2024-05-02 RX ADMIN — HYDROMORPHONE HYDROCHLORIDE 0.2 MG: 1 INJECTION, SOLUTION INTRAMUSCULAR; INTRAVENOUS; SUBCUTANEOUS at 02:21

## 2024-05-02 RX ADMIN — SERTRALINE 50 MG: 50 TABLET, FILM COATED ORAL at 08:29

## 2024-05-02 SDOH — ECONOMIC STABILITY: INCOME INSECURITY: HOW HARD IS IT FOR YOU TO PAY FOR THE VERY BASICS LIKE FOOD, HOUSING, MEDICAL CARE, AND HEATING?: NOT HARD AT ALL

## 2024-05-02 SDOH — ECONOMIC STABILITY: INCOME INSECURITY: IN THE LAST 12 MONTHS, WAS THERE A TIME WHEN YOU WERE NOT ABLE TO PAY THE MORTGAGE OR RENT ON TIME?: NO

## 2024-05-02 SDOH — ECONOMIC STABILITY: HOUSING INSECURITY
IN THE LAST 12 MONTHS, WAS THERE A TIME WHEN YOU DID NOT HAVE A STEADY PLACE TO SLEEP OR SLEPT IN A SHELTER (INCLUDING NOW)?: NO

## 2024-05-02 SDOH — ECONOMIC STABILITY: TRANSPORTATION INSECURITY
IN THE PAST 12 MONTHS, HAS THE LACK OF TRANSPORTATION KEPT YOU FROM MEDICAL APPOINTMENTS OR FROM GETTING MEDICATIONS?: NO

## 2024-05-02 SDOH — ECONOMIC STABILITY: TRANSPORTATION INSECURITY
IN THE PAST 12 MONTHS, HAS LACK OF TRANSPORTATION KEPT YOU FROM MEETINGS, WORK, OR FROM GETTING THINGS NEEDED FOR DAILY LIVING?: NO

## 2024-05-02 SDOH — ECONOMIC STABILITY: HOUSING INSECURITY: IN THE LAST 12 MONTHS, HOW MANY PLACES HAVE YOU LIVED?: 1

## 2024-05-02 ASSESSMENT — COGNITIVE AND FUNCTIONAL STATUS - GENERAL
DAILY ACTIVITIY SCORE: 24
MOBILITY SCORE: 24
DRESSING REGULAR LOWER BODY CLOTHING: A LITTLE
MOBILITY SCORE: 24
HELP NEEDED FOR BATHING: A LITTLE
DAILY ACTIVITIY SCORE: 22

## 2024-05-02 ASSESSMENT — PAIN SCALES - GENERAL
PAINLEVEL_OUTOF10: 8
PAINLEVEL_OUTOF10: 5 - MODERATE PAIN
PAINLEVEL_OUTOF10: 6
PAINLEVEL_OUTOF10: 7
PAINLEVEL_OUTOF10: 6
PAINLEVEL_OUTOF10: 9
PAINLEVEL_OUTOF10: 8

## 2024-05-02 ASSESSMENT — PAIN - FUNCTIONAL ASSESSMENT
PAIN_FUNCTIONAL_ASSESSMENT: 0-10

## 2024-05-02 ASSESSMENT — ACTIVITIES OF DAILY LIVING (ADL)
ADL_ASSISTANCE: INDEPENDENT
BATHING_ASSISTANCE: STAND BY
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
## 2024-05-02 NOTE — PROGRESS NOTES
Physical Therapy    Physical Therapy Evaluation    Patient Name: Tuyet Ramirez  MRN: [id number]  Today's Date: 5/2/2024   Time Calculation  Start Time: 0926  Stop Time: 0935  Time Calculation (min): 9 min    Assessment/Plan   PT Assessment  End of Session Communication: Bedside nurse  Assessment Comment: pt admitted after fall and with T11 and L2 compression fxs. pt at baseline mobiltiy and educated on spinal precautions.  End of Session Patient Position: Bed, 2 rail up, Alarm off, not on at start of session  IP OR SWING BED PT PLAN  Inpatient or Swing Bed: Inpatient  PT Plan  PT Plan: PT Eval only  PT Eval Only Reason: At baseline function  PT Discharge Recommendations: No further acute PT, No PT needed after discharge  PT - OK to Discharge: Yes (Eval received and completed. REcs made.)      Subjective   General Visit Information:  General  Reason for Referral: (1) T11 compression fx; (2) L2 superior endplate compression fx, (3) Question of free air although likely collapsed bowel loops  Past Medical History Relevant to Rehab: asthama, HLD, GERD, depression, HTN, anxiety, constipation  Prior to Session Communication: Bedside nurse  Patient Position Received: Bed, 2 rail up, Alarm off, not on at start of session  General Comment:  (pt sitting up in bed eating breakfast and willing to participate.)  Home Living:  Home Living  Type of Home: House  Lives With:  (mother, step-father; pt reports both parents are in good health and the three of them help eachother out as needed)  Home Adaptive Equipment:  (walking stick)  Home Layout: One level, Laundry in basement (parents able to assist with laundry if needed)  Bathroom Shower/Tub: Walk-in shower  Bathroom Toilet: Standard  Bathroom Equipment: Shower chair with back, Grab bars in shower  Prior Level of Function:  Prior Function Per Pt/Caregiver Report  Receives Help From: Family  Precautions:  Precautions  Medical Precautions: Fall precautions, Spinal precautions (spinal  precautions for comfort.pt reports 5 falls in last 3 months)  Vital Signs:       Objective   Pain:  Pain Assessment  Pain Assessment: 0-10  Pain Score: 5 - Moderate pain  Pain Location: Back  Pain Interventions: Ambulation/increased activity (discussed positioning and changes of position)  Cognition:  Cognition  Overall Cognitive Status: Within Functional Limits  Arousal/Alertness: Appropriate responses to stimuli  Orientation Level: Oriented X4    General Assessments:  Activity Tolerance  Endurance: Tolerates 10 - 20 min exercise with multiple rests    Sensation  Light Touch: No apparent deficits  Sensation Comment: no numbnes and tingling reported    Strength  Strength Comments: B LE grossly WFL       Static Sitting Balance  Static Sitting-Balance Support: Feet supported  Static Sitting-Level of Assistance: Independent    Static Standing Balance  Static Standing-Balance Support: No upper extremity supported  Static Standing-Level of Assistance: Distant supervision  Dynamic Standing Balance  Dynamic Standing-Comments: able to complete [place] with eyes open and closed with S  Functional Assessments:  Bed Mobility 1  Bed Mobility 1: Supine to sitting, Sitting to supine  Level of Assistance 1: Close supervision  Bed Mobility Comments 1: HOB slightly elevated    Transfers  Transfer: Yes  Transfer 1  Transfer From 1: Sit to, Stand to  Transfer to 1: Stand, Sit  Technique 1: Sit to stand, Stand to sit  Transfer Device 1:  (none)  Transfer Level of Assistance 1: Distant supervision  Trials/Comments 1: pt safe with ambulation    Ambulation/Gait Training  Ambulation/Gait Training Performed: Yes  Ambulation/Gait Training 1  Surface 1: Level tile  Device 1: No device  Assistance 1: Distant supervision  Quality of Gait 1: Inconsistent stride length, Decreased step length  Comments/Distance (ft) 1: pt ambulated 150 ft    Stairs  Stairs: No  Extremity/Trunk Assessments:  RLE   RLE : Within Functional Limits  LLE   LLE : Within  Functional Limits  Outcome Measures:  [facility] Basic Mobility  Turning from your back to your side while in a flat bed without using bedrails: None  Moving from lying on your back to sitting on the side of a flat bed without using bedrails: None  Moving to and from bed to chair (including a wheelchair): None  Standing up from a chair using your arms (e.g. wheelchair or bedside chair): None  To walk in hospital room: None  Climbing 3-5 steps with railing: None  Basic Mobility - Total Score: 24    Encounter Problems       Encounter Problems (Active)       Pain - Adult              Education Documentation  Precautions, taught by Lillie Lyle, PT at 5/2/2024 10:25 AM.  Learner: Patient  Readiness: Eager  Method: Explanation  Response: Verbalizes Understanding    Mobility Training, taught by Lillie Lyle, PT at 5/2/2024 10:25 AM.  Learner: Patient  Readiness: Eager  Method: Explanation  Response: Verbalizes Understanding    Education Comments  No comments found.

## 2024-05-02 NOTE — PROGRESS NOTES
Pharmacy Medication History Review    Tuyet Ramirez is a 68 y.o. female admitted for Peritoneal free air. Pharmacy reviewed the patient's nflvs-mi-bihizkndf medications and allergies for accuracy.      The list below reflects the updated PTA list. Comments regarding how patient may be taking medications differently can be found in the Admit Orders Activity  Prior to Admission Medications   Prescriptions Last Dose Informant   carvedilol (Coreg) 12.5 mg tablet Unknown Self   Sig: Take 1 tablet (12.5 mg) by mouth once daily.  Last filled date is showing July of 2023 #60 for 30ds    cyanocobalamin (Vitamin B-12) 500 mcg tablet 5/1/2024 Self   Sig: Take 1 tablet (500 mcg) by mouth once daily.   hydrocortisone 1 % ointment Unknown Self   Sig: Apply 1 Application topically if needed for irritation or rash.   multivitamin tablet 5/1/2024 Self   Sig: Take 1 tablet by mouth once daily.   omeprazole (PriLOSEC) 40 mg DR capsule 5/1/2024 Self   Sig: TAKE 1 TABLET BY MOUTH 30-60 MINUTES BEFORE BREAKFAST.   Patient taking differently: Take 1 capsule (40 mg) by mouth 2 times a day.   rosuvastatin (Crestor) 10 mg tablet 5/1/2024 Self   Sig: Take 1 tablet (10 mg) by mouth once daily.  Last filled date is showing July of 2023 for 30ds    sertraline (Zoloft) 50 mg tablet 5/1/2024 Self   Sig: Take 1 tablet (50 mg) by mouth once daily.   triamcinolone (Kenalog) 0.5 % cream Unknown Self   Sig: Apply 1 Application topically if needed for irritation or rash.      Facility-Administered Medications: None        The list below reflects the updated allergy list. Please review each documented allergy for additional clarification and justification.  Allergies  Reviewed by Paul Mendiola PharmD on 5/1/2024   No Known Allergies         Patient declines M2B at discharge. Pharmacy has been updated to [facility] on [address].    Sources used to complete the med history include   Prior to admission med grid  Medication dispense history  OARRS (clonazepam from  Subjective   Chief Complaint   Patient presents with   • fecal urgency w/incontinence       Miri Yung is a  82 y.o. female here for a follow up visit for fecal urgency with incontinence.    Symptoms of developed over the past several months.  The only new medicine she started during that time is Eliquis.  She was noted to have a fib after a stroke work-up.  She has inconsistent bowel movements in varying frequency.  No blood in her stool.  She is had no abdominal pain.  She has had full incontinence of feces without warning.  Prior to these recent symptoms she was usually mildly constipated.  She is taken MiraLAX in the past for this.    She started metformin in January and bupropion in March.  No other new medications.    She underwent an EGD and colonoscopy in 2016 performed by Dr. Yaniv Becerra.  Procedures were normal with the exception of hemorrhoids.  These were performed for anemia.         HPI  Past Medical History:   Diagnosis Date   • Acute ischemic stroke (CMS/HCC)    • Anxiety    • Atrial fibrillation (CMS/HCC)    • Dementia (CMS/HCC)    • Depression    • Edema    • Encephalopathy     secondary to endocarditis   • Endocarditis     with mitral and tricuspid regurgitation   • Esophageal reflux    • GERD (gastroesophageal reflux disease)    • Gout    • History of blood transfusion     due to anemia   • HTN (hypertension)    • Hyperlipidemia    • Insomnia    • LOM (loss of memory)    • Lung mass    • Mitral and aortic valve disease     secondary to endocarditis; generally asymptomatic   • Mitral regurgitation    • Mixed anxiety depressive disorder    • Nonrheumatic tricuspid (valve) insufficiency    • Osteoarthritis    • PAF (paroxysmal atrial fibrillation) (CMS/HCC)    • Sacroiliitis (CMS/HCC)    • Tricuspid regurgitation    • Type 2 diabetes mellitus (CMS/HCC)      Past Surgical History:   Procedure Laterality Date   • CATARACT EXTRACTION     • COLONOSCOPY N/A 3/10/2016    Procedure: COLONOSCOPY TO  2022)  Patient interview. Patient confirmed taking carvedilol and rosuvastatin regularly, but last dispense was 7/2023 for 30 day supply    No additional concerns with the patient's PTA list.      Livier Mendiola, PharmD   Meds PGY1 Pharmacy Resident    Meds Amb[facility] Services  Please reach out via [employer] Secure Chat for questions, or if no response call [phone] or TipTap “MedRec”      CECUM;  Surgeon: Shaan Becerra Jr., MD;  Location: [facility] ENDOSCOPY;  Service:    • ENDOSCOPY N/A 3/10/2016    Procedure: ESOPHAGOGASTRODUODENOSCOPY ;  Surgeon: Shaan Becerra Jr., MD;  Location: [facility] ENDOSCOPY;  Service:    • KNEE SURGERY     • TUBAL ABDOMINAL LIGATION         Current Outpatient Medications:   •  acetaminophen (TYLENOL) 500 MG tablet, Take 500 mg by mouth every 4 (four) hours as needed for mild pain (1-3) (1-2 TABLETS PRN Q 4-6 HOURS)., Disp: , Rfl:   •  allopurinol (ZYLOPRIM) 300 MG tablet, TAKE 1 TABLET BY MOUTH  DAILY, Disp: 90 tablet, Rfl: 0  •  Apixaban (ELIQUIS PO), Take  by mouth., Disp: , Rfl:   •  aspirin 81 MG EC tablet, Take 1 tablet by mouth Daily., Disp: 30 tablet, Rfl: 0  •  atorvastatin (LIPITOR) 40 MG tablet, Take 40 mg by mouth Daily., Disp: , Rfl:   •  buPROPion SR (WELLBUTRIN SR) 150 MG 12 hr tablet, TAKE 1 TABLET BY MOUTH DAILY WITH BREAKFAST, Disp: 30 tablet, Rfl: 2  •  furosemide (LASIX) 20 MG tablet, TAKE 1 TABLET BY MOUTH TWO  TIMES DAILY (Patient taking differently: TAKE 1 TABLET BY MOUTH DAILY), Disp: 180 tablet, Rfl: 0  •  isosorbide mononitrate (IMDUR) 60 MG 24 hr tablet, TAKE 1 TABLET BY MOUTH  DAILY, Disp: 90 tablet, Rfl: 0  •  losartan (COZAAR) 25 MG tablet, TAKE 1 TABLET BY MOUTH IN  THE MORNING AND 2 TABLETS  AT NIGHT, Disp: 270 tablet, Rfl: 0  •  metFORMIN (GLUCOPHAGE) 500 MG tablet, TAKE 1 TABLET BY MOUTH DAILY WITH BREAKFAST, Disp: 90 tablet, Rfl: 0  •  metoprolol succinate XL (TOPROL-XL) 100 MG 24 hr tablet, TAKE 1 TABLET BY MOUTH  DAILY, Disp: 90 tablet, Rfl: 0  •  mirtazapine (REMERON) 7.5 MG tablet, Take 1 tablet by mouth Every Night., Disp: 30 tablet, Rfl: 3  •  montelukast (SINGULAIR) 10 MG tablet, TAKE 1 TABLET BY MOUTH EVERY NIGHT, Disp: 90 tablet, Rfl: 0  •  Multiple Vitamins-Minerals (MULTI FOR HER 50+) capsule, Take 1 capsule by mouth daily., Disp: , Rfl:   •  pantoprazole (PROTONIX) 40 MG EC tablet, TAKE 1 TABLET BY MOUTH  DAILY, Disp: 90 tablet,  Rfl: 0  •  potassium chloride (K-DUR) 10 MEQ CR tablet, TAKE 1 TABLET BY MOUTH  EVERY MORNING, Disp: 90 tablet, Rfl: 0  PRN Meds:.  Allergies   Allergen Reactions   • Decongestant  [Pseudoephedrine]      Social History     Socioeconomic History   • Marital status:      Spouse name: Not on file   • Number of children: Not on file   • Years of education: Not on file   • Highest education level: Not on file   Tobacco Use   • Smoking status: Never Smoker   • Smokeless tobacco: Never Used   • Tobacco comment: caffeine use    Substance and Sexual Activity   • Alcohol use: No   • Drug use: No   • Sexual activity: Defer     Family History   Problem Relation Age of Onset   • Kidney disease Mother    • Heart disease Mother    • Emphysema Father    • Heart disease Brother      Review of Systems   Constitutional: Negative for appetite change and unexpected weight change.   Gastrointestinal: Positive for diarrhea. Negative for abdominal pain and blood in stool.   All other systems reviewed and are negative.    Vitals:    11/06/19 1326   BP: 138/82   Temp: 97.8 °F (36.6 °C)         11/06/19  1326   Weight: 81.6 kg (179 lb 12.8 oz)       Objective   Physical Exam   Constitutional: She appears well-developed and well-nourished.   HENT:   Head: Normocephalic and atraumatic.   Eyes: No scleral icterus.   Pulmonary/Chest: Effort normal.   Abdominal: Soft. She exhibits no distension. There is no tenderness.   Neurological: She is alert.   Skin: Skin is warm and dry.   Psychiatric: She has a normal mood and affect.     No images are attached to the encounter.    Assessment/Plan   Diagnoses and all orders for this visit:    Altered bowel habits    Full incontinence of feces    Other orders  -     Discontinue: rivaroxaban (XARELTO) 10 MG tablet; Take  by mouth Daily.      Plan:  · Recommend starting Benefiber 1-2 times daily-hopefully with better consistency and more bulk she will since her bowel movements better and be able to  control it  · If this does not improve with fiber, will discuss with her PCP and alternative regimen for her diabetes.  Metformin can certainly cause the symptoms as well although they have started more recently.

## 2024-05-02 NOTE — DISCHARGE INSTRUCTIONS
[facility]  DISCHARGE INSTRUCTIONS    You were admitted to [facility] from 4/30/24 - 5/2/24.    GENERAL INSTRUCTIONS  1) Diet: Resume regular diet that you were consuming prior to admission.     2) Activity:   - Move around as you are able  - Avoid strenuous activities including intensive movements as you are healing.   - Normal activity as tolerated until follow up visits.  - No heavy bending/twisting/lifting > 10 lbs until follow up visit with ortho-spine.  Driving: No driving while taking narcotic medications and until follow up visits.    3) Medications:   - You are to resume all your home medications as previously prescribed. Additionally, you have been given a prescription for Tylenol (every 8 hours as needed for pain/fever/headache for 10 days), Oxycodone (every 6 hours as needed for moderate/severe pain for 5 days), and Phyllis-Colace (x7 days for constipation). If refills are needed for your medications, please don't hesitate to reach out to your primary care provider.    4) Wound care:  Keep wounds clean, dry, and covered with a dressing as desired/necessary. No lotions, powders, or creams over wounds. No tub soaks. Apply bacitracin ointment to wounds/abrasions three times per day and as needed for up to 7 days. If splint is in place, do not remove until follow up appointment and/or sutures will be removed in the office during your follow up appointment. Call the office immediately if you notice any sign of infection such as increased redness, warmth, thick drainage, wound separation, or spiking fever/chills.     5) Follow up appointments:  - Trauma Surgery: A trauma clinic follow up is not necessary regarding your recent hospital admission. Follow up with the trauma surgery clinic as needed/desired. To follow up, please call the trauma clinic at [phone] to schedule your appointment. Do not hesitate to call our outpatient nurse  coordinator at [phone] with any questions/concerns. The nurse will get back to you within 48-72 hours. If you feel it is an emergency, please proceed to your nearest Emergency Room.  - Orthopedic-Spine: If you have not been contacted within 2-3 days of discharge from the hospital, please call [phone] to schedule your follow up visit within 2 weeks of discharge with orthopedics regarding your recent hospital admission.  - Primary Care Provider: Please follow up with your PCP within 1-2 weeks after discharge regarding your recent hospital admission. If you do not have a primary care provider, you may also call the hospital main number at [phone] and ask for a referral.    6) Please notify your physician if you have the following symptoms.     - Fever > 100.5 F (>38 C), chills  - Uncontrolled nausea and/or vomiting  - Chest pain  - New or worsening shortness of breathe  - Uncontrolled diarrhea   - You stop passing gas   - Have new bruising, rashes, blistering on your body  - New numbness/tingling, loss of feeling in any part of your body or a new decreased ability to move any part of your body  - New or worsening swelling  - Uncontrolled pain    If you display any of the following signs/symptoms: increased confusion, altered mental status, new numbness or tingling, decreased sensation or movement, pain that is uncontrolled with pain medications, increased shortness of breath, chest pain/palpitations, or any other concerning signs/symptoms, please proceed to your nearest Emergency Department for further evaluation and management.

## 2024-05-02 NOTE — NURSING NOTE
Patient discharged home,iv removed  discharge instructions explained,  medicated for pain, pt verbalized understanding.  Dr gomez notified this morning patient was smoking in bathroom  Enma Hernandez RN

## 2024-05-03 ENCOUNTER — PATIENT OUTREACH (OUTPATIENT)
Dept: PRIMARY CARE | Facility: CLINIC | Age: 69
End: 2024-05-03
Payer: MEDICARE

## 2024-05-03 LAB
ATRIAL RATE: 82 BPM
P AXIS: 75 DEGREES
P OFFSET: 211 MS
P ONSET: 163 MS
PR INTERVAL: 134 MS
Q ONSET: 230 MS
QRS COUNT: 13 BEATS
QRS DURATION: 68 MS
QT INTERVAL: 386 MS
QTC CALCULATION(BAZETT): 450 MS
QTC FREDERICIA: 428 MS
R AXIS: -39 DEGREES
T AXIS: 14 DEGREES
T OFFSET: 423 MS
VENTRICULAR RATE: 82 BPM

## 2024-05-03 PROCEDURE — 93005 ELECTROCARDIOGRAM TRACING: CPT

## 2024-05-03 NOTE — PROGRESS NOTES
Discharge Facility:  [facility]     Discharge Diagnosis:  Peritoneal free air  T11 and L2 Compression fx    Admission Date:4/30/24  Discharge Date: 5/2/24    PCP Appointment Date:Pt reported that she has not seen Dr Juárez in years and was told that she would have to see Dr Varner now. Patient declined to offer to set up follow up appt with [facility]. She stated they are no longer her doctors and she prefers to follow up with Dr Fleming who is a PCP closer to her house.     Specialist Appointment Date:   CM reviewed importance of contacting for follow ups if she does not hear from ortho in next day or two. Patient agreed to call.   - Trauma Surgery: A trauma clinic follow up is not necessary regarding your recent hospital admission. Follow up with the trauma surgery clinic as needed/desired. To follow up, please call the trauma clinic at [phone] to schedule your appointment. Do not hesitate to call our outpatient nurse coordinator at [phone] with any questions/concerns. The nurse will get back to you within 48-72 hours. If you feel it is an emergency, please proceed to your nearest Emergency Room.  - Orthopedic-Spine: If you have not been contacted within 2-3 days of discharge from the hospital, please call [phone] to schedule your follow up visit within 2 weeks of discharge with orthopedics regarding your recent hospital admission.    Patient confirmed she received her discharge papers and medications from pharmacy. She understands new medication schedule and has no questions about her care.     CM will not open TCM program at this time

## 2024-05-20 ENCOUNTER — APPOINTMENT (OUTPATIENT)
Dept: RADIOLOGY | Facility: CLINIC | Age: 69
End: 2024-05-20
Payer: MEDICARE

## 2024-08-09 ENCOUNTER — APPOINTMENT (OUTPATIENT)
Dept: GASTROENTEROLOGY | Facility: CLINIC | Age: 69
End: 2024-08-09
Payer: MEDICARE

## 2024-11-09 ENCOUNTER — APPOINTMENT (OUTPATIENT)
Dept: CARDIOLOGY | Facility: HOSPITAL | Age: 69
End: 2024-11-09
Payer: MEDICARE

## 2024-11-09 ENCOUNTER — HOSPITAL ENCOUNTER (INPATIENT)
Facility: HOSPITAL | Age: 69
End: 2024-11-09
Attending: EMERGENCY MEDICINE | Admitting: SURGERY
Payer: MEDICARE

## 2024-11-09 ENCOUNTER — APPOINTMENT (OUTPATIENT)
Dept: RADIOLOGY | Facility: HOSPITAL | Age: 69
End: 2024-11-09
Payer: MEDICARE

## 2024-11-09 DIAGNOSIS — S22.22XD CLOSED FRACTURE OF BODY OF STERNUM WITH ROUTINE HEALING: ICD-10-CM

## 2024-11-09 DIAGNOSIS — R91.1 LUNG NODULE: ICD-10-CM

## 2024-11-09 DIAGNOSIS — R94.31 ABNORMAL ELECTROCARDIOGRAM (ECG) (EKG): ICD-10-CM

## 2024-11-09 DIAGNOSIS — S22.20XA CLOSED FRACTURE OF STERNUM, UNSPECIFIED PORTION OF STERNUM, INITIAL ENCOUNTER: Primary | ICD-10-CM

## 2024-11-09 DIAGNOSIS — S22.42XA: ICD-10-CM

## 2024-11-09 LAB
ALBUMIN SERPL BCP-MCNC: 3.9 G/DL (ref 3.4–5)
ALP SERPL-CCNC: 60 U/L (ref 33–136)
ALT SERPL W P-5'-P-CCNC: 15 U/L (ref 7–45)
ANION GAP SERPL CALC-SCNC: 10 MMOL/L (ref 10–20)
ANION GAP SERPL CALC-SCNC: 10 MMOL/L (ref 10–20)
AST SERPL W P-5'-P-CCNC: 21 U/L (ref 9–39)
BASOPHILS # BLD AUTO: 0.03 X10*3/UL (ref 0–0.1)
BASOPHILS NFR BLD AUTO: 0.3 %
BILIRUB SERPL-MCNC: 1.1 MG/DL (ref 0–1.2)
BUN SERPL-MCNC: 15 MG/DL (ref 6–23)
BUN SERPL-MCNC: 16 MG/DL (ref 6–23)
CALCIUM SERPL-MCNC: 9.3 MG/DL (ref 8.6–10.3)
CALCIUM SERPL-MCNC: 9.5 MG/DL (ref 8.6–10.3)
CARDIAC TROPONIN I PNL SERPL HS: 16 NG/L (ref 0–13)
CARDIAC TROPONIN I PNL SERPL HS: 19 NG/L (ref 0–13)
CARDIAC TROPONIN I PNL SERPL HS: 21 NG/L (ref 0–13)
CHLORIDE SERPL-SCNC: 85 MMOL/L (ref 98–107)
CHLORIDE SERPL-SCNC: 87 MMOL/L (ref 98–107)
CO2 SERPL-SCNC: 30 MMOL/L (ref 21–32)
CO2 SERPL-SCNC: 30 MMOL/L (ref 21–32)
CREAT SERPL-MCNC: 0.91 MG/DL (ref 0.5–1.05)
CREAT SERPL-MCNC: 0.96 MG/DL (ref 0.5–1.05)
EGFRCR SERPLBLD CKD-EPI 2021: 65 ML/MIN/1.73M*2
EGFRCR SERPLBLD CKD-EPI 2021: 69 ML/MIN/1.73M*2
EOSINOPHIL # BLD AUTO: 0.11 X10*3/UL (ref 0–0.7)
EOSINOPHIL NFR BLD AUTO: 1.3 %
ERYTHROCYTE [DISTWIDTH] IN BLOOD BY AUTOMATED COUNT: 12.5 % (ref 11.5–14.5)
GLUCOSE SERPL-MCNC: 105 MG/DL (ref 74–99)
GLUCOSE SERPL-MCNC: 115 MG/DL (ref 74–99)
HCT VFR BLD AUTO: 39.5 % (ref 36–46)
HGB BLD-MCNC: 13.6 G/DL (ref 12–16)
IMM GRANULOCYTES # BLD AUTO: 0.03 X10*3/UL (ref 0–0.7)
IMM GRANULOCYTES NFR BLD AUTO: 0.3 % (ref 0–0.9)
LIPASE SERPL-CCNC: 11 U/L (ref 9–82)
LYMPHOCYTES # BLD AUTO: 1.68 X10*3/UL (ref 1.2–4.8)
LYMPHOCYTES NFR BLD AUTO: 19.4 %
MAGNESIUM SERPL-MCNC: 1.67 MG/DL (ref 1.6–2.4)
MCH RBC QN AUTO: 31.6 PG (ref 26–34)
MCHC RBC AUTO-ENTMCNC: 34.4 G/DL (ref 32–36)
MCV RBC AUTO: 92 FL (ref 80–100)
MONOCYTES # BLD AUTO: 1.25 X10*3/UL (ref 0.1–1)
MONOCYTES NFR BLD AUTO: 14.4 %
NEUTROPHILS # BLD AUTO: 5.57 X10*3/UL (ref 1.2–7.7)
NEUTROPHILS NFR BLD AUTO: 64.3 %
NRBC BLD-RTO: 0 /100 WBCS (ref 0–0)
PLATELET # BLD AUTO: 183 X10*3/UL (ref 150–450)
POTASSIUM SERPL-SCNC: 3.2 MMOL/L (ref 3.5–5.3)
POTASSIUM SERPL-SCNC: 3.8 MMOL/L (ref 3.5–5.3)
PROT SERPL-MCNC: 7.1 G/DL (ref 6.4–8.2)
RBC # BLD AUTO: 4.31 X10*6/UL (ref 4–5.2)
SODIUM SERPL-SCNC: 122 MMOL/L (ref 136–145)
SODIUM SERPL-SCNC: 123 MMOL/L (ref 136–145)
WBC # BLD AUTO: 8.7 X10*3/UL (ref 4.4–11.3)

## 2024-11-09 PROCEDURE — 2500000001 HC RX 250 WO HCPCS SELF ADMINISTERED DRUGS (ALT 637 FOR MEDICARE OP): Performed by: REGISTERED NURSE

## 2024-11-09 PROCEDURE — 83930 ASSAY OF BLOOD OSMOLALITY: CPT | Mod: PARLAB

## 2024-11-09 PROCEDURE — 74177 CT ABD & PELVIS W/CONTRAST: CPT | Performed by: RADIOLOGY

## 2024-11-09 PROCEDURE — 83690 ASSAY OF LIPASE: CPT

## 2024-11-09 PROCEDURE — 80048 BASIC METABOLIC PNL TOTAL CA: CPT | Mod: CCI | Performed by: INTERNAL MEDICINE

## 2024-11-09 PROCEDURE — 36415 COLL VENOUS BLD VENIPUNCTURE: CPT

## 2024-11-09 PROCEDURE — 2550000001 HC RX 255 CONTRASTS: Performed by: EMERGENCY MEDICINE

## 2024-11-09 PROCEDURE — 83735 ASSAY OF MAGNESIUM: CPT

## 2024-11-09 PROCEDURE — 93005 ELECTROCARDIOGRAM TRACING: CPT

## 2024-11-09 PROCEDURE — 2500000004 HC RX 250 GENERAL PHARMACY W/ HCPCS (ALT 636 FOR OP/ED): Performed by: REGISTERED NURSE

## 2024-11-09 PROCEDURE — 74177 CT ABD & PELVIS W/CONTRAST: CPT

## 2024-11-09 PROCEDURE — 96374 THER/PROPH/DIAG INJ IV PUSH: CPT

## 2024-11-09 PROCEDURE — 85025 COMPLETE CBC W/AUTO DIFF WBC: CPT

## 2024-11-09 PROCEDURE — 84484 ASSAY OF TROPONIN QUANT: CPT

## 2024-11-09 PROCEDURE — 84075 ASSAY ALKALINE PHOSPHATASE: CPT

## 2024-11-09 PROCEDURE — 71275 CT ANGIOGRAPHY CHEST: CPT

## 2024-11-09 PROCEDURE — 2500000005 HC RX 250 GENERAL PHARMACY W/O HCPCS: Performed by: REGISTERED NURSE

## 2024-11-09 PROCEDURE — 2500000001 HC RX 250 WO HCPCS SELF ADMINISTERED DRUGS (ALT 637 FOR MEDICARE OP)

## 2024-11-09 PROCEDURE — 1200000002 HC GENERAL ROOM WITH TELEMETRY DAILY

## 2024-11-09 PROCEDURE — 84484 ASSAY OF TROPONIN QUANT: CPT | Performed by: REGISTERED NURSE

## 2024-11-09 PROCEDURE — 99222 1ST HOSP IP/OBS MODERATE 55: CPT | Performed by: REGISTERED NURSE

## 2024-11-09 PROCEDURE — 99285 EMERGENCY DEPT VISIT HI MDM: CPT | Mod: 25

## 2024-11-09 PROCEDURE — 99222 1ST HOSP IP/OBS MODERATE 55: CPT | Performed by: INTERNAL MEDICINE

## 2024-11-09 PROCEDURE — 99223 1ST HOSP IP/OBS HIGH 75: CPT | Performed by: SURGERY

## 2024-11-09 PROCEDURE — 2500000002 HC RX 250 W HCPCS SELF ADMINISTERED DRUGS (ALT 637 FOR MEDICARE OP, ALT 636 FOR OP/ED)

## 2024-11-09 PROCEDURE — 2500000004 HC RX 250 GENERAL PHARMACY W/ HCPCS (ALT 636 FOR OP/ED): Performed by: INTERNAL MEDICINE

## 2024-11-09 PROCEDURE — 2500000004 HC RX 250 GENERAL PHARMACY W/ HCPCS (ALT 636 FOR OP/ED)

## 2024-11-09 RX ORDER — SERTRALINE HYDROCHLORIDE 50 MG/1
50 TABLET, FILM COATED ORAL DAILY
Status: DISPENSED | OUTPATIENT
Start: 2024-11-09

## 2024-11-09 RX ORDER — POLYETHYLENE GLYCOL 3350 17 G/17G
17 POWDER, FOR SOLUTION ORAL DAILY
Status: DISPENSED | OUTPATIENT
Start: 2024-11-10

## 2024-11-09 RX ORDER — PNV NO.95/FERROUS FUM/FOLIC AC 28MG-0.8MG
500 TABLET ORAL DAILY
Status: DISPENSED | OUTPATIENT
Start: 2024-11-10

## 2024-11-09 RX ORDER — ENOXAPARIN SODIUM 100 MG/ML
30 INJECTION SUBCUTANEOUS EVERY 12 HOURS
Status: DISPENSED | OUTPATIENT
Start: 2024-11-09

## 2024-11-09 RX ORDER — SODIUM CHLORIDE 9 MG/ML
100 INJECTION, SOLUTION INTRAVENOUS CONTINUOUS
Status: DISCONTINUED | OUTPATIENT
Start: 2024-11-09 | End: 2024-11-10

## 2024-11-09 RX ORDER — ROSUVASTATIN CALCIUM 10 MG/1
10 TABLET, COATED ORAL DAILY
Status: DISPENSED | OUTPATIENT
Start: 2024-11-10

## 2024-11-09 RX ORDER — WATER
250 LIQUID (ML) MISCELLANEOUS EVERY 8 HOURS SCHEDULED
Status: SHIPPED | OUTPATIENT
Start: 2024-11-09

## 2024-11-09 RX ORDER — GABAPENTIN 100 MG/1
100 CAPSULE ORAL 3 TIMES DAILY
Status: DISPENSED | OUTPATIENT
Start: 2024-11-09

## 2024-11-09 RX ORDER — ASPIRIN 325 MG
325 TABLET ORAL ONCE
Status: COMPLETED | OUTPATIENT
Start: 2024-11-09 | End: 2024-11-09

## 2024-11-09 RX ORDER — MORPHINE SULFATE 2 MG/ML
1 INJECTION, SOLUTION INTRAMUSCULAR; INTRAVENOUS ONCE
Status: DISCONTINUED | OUTPATIENT
Start: 2024-11-09 | End: 2024-11-09

## 2024-11-09 RX ORDER — OXYCODONE HYDROCHLORIDE 5 MG/1
5 TABLET ORAL EVERY 6 HOURS PRN
Status: DISPENSED | OUTPATIENT
Start: 2024-11-09

## 2024-11-09 RX ORDER — PANTOPRAZOLE SODIUM 40 MG/1
40 TABLET, DELAYED RELEASE ORAL
Status: DISPENSED | OUTPATIENT
Start: 2024-11-09

## 2024-11-09 RX ORDER — LIDOCAINE 560 MG/1
1 PATCH PERCUTANEOUS; TOPICAL; TRANSDERMAL DAILY
Status: DISPENSED | OUTPATIENT
Start: 2024-11-09

## 2024-11-09 RX ORDER — POTASSIUM CHLORIDE 20 MEQ/1
40 TABLET, EXTENDED RELEASE ORAL ONCE
Status: COMPLETED | OUTPATIENT
Start: 2024-11-09 | End: 2024-11-09

## 2024-11-09 RX ORDER — POLYETHYLENE GLYCOL 3350 17 G/17G
17 POWDER, FOR SOLUTION ORAL 3 TIMES DAILY
Status: DISCONTINUED | OUTPATIENT
Start: 2024-11-09 | End: 2024-11-09

## 2024-11-09 RX ORDER — CARVEDILOL 12.5 MG/1
12.5 TABLET ORAL DAILY
Status: DISPENSED | OUTPATIENT
Start: 2024-11-09

## 2024-11-09 RX ORDER — ACETAMINOPHEN 325 MG/1
975 TABLET ORAL EVERY 6 HOURS SCHEDULED
Status: DISPENSED | OUTPATIENT
Start: 2024-11-09

## 2024-11-09 RX ORDER — MORPHINE SULFATE 4 MG/ML
4 INJECTION, SOLUTION INTRAMUSCULAR; INTRAVENOUS ONCE
Status: COMPLETED | OUTPATIENT
Start: 2024-11-09 | End: 2024-11-09

## 2024-11-09 RX ORDER — ONDANSETRON HYDROCHLORIDE 2 MG/ML
4 INJECTION, SOLUTION INTRAVENOUS EVERY 4 HOURS PRN
Status: ACTIVE | OUTPATIENT
Start: 2024-11-09

## 2024-11-09 RX ORDER — LANOLIN ALCOHOL/MO/W.PET/CERES
400 CREAM (GRAM) TOPICAL DAILY
Status: DISPENSED | OUTPATIENT
Start: 2024-11-09

## 2024-11-09 RX ADMIN — MAGNESIUM OXIDE TAB 400 MG (241.3 MG ELEMENTAL MG) 400 MG: 400 (241.3 MG) TAB at 18:38

## 2024-11-09 RX ADMIN — SODIUM CHLORIDE 100 ML/HR: 9 INJECTION, SOLUTION INTRAVENOUS at 20:28

## 2024-11-09 RX ADMIN — GABAPENTIN 100 MG: 100 CAPSULE ORAL at 20:25

## 2024-11-09 RX ADMIN — ACETAMINOPHEN 975 MG: 325 TABLET, FILM COATED ORAL at 18:38

## 2024-11-09 RX ADMIN — IOHEXOL 85 ML: 350 INJECTION, SOLUTION INTRAVENOUS at 14:22

## 2024-11-09 RX ADMIN — ENOXAPARIN SODIUM 30 MG: 30 INJECTION SUBCUTANEOUS at 20:26

## 2024-11-09 RX ADMIN — Medication 250 ML: at 22:13

## 2024-11-09 RX ADMIN — MORPHINE SULFATE 4 MG: 4 INJECTION, SOLUTION INTRAMUSCULAR; INTRAVENOUS at 13:25

## 2024-11-09 RX ADMIN — LIDOCAINE 1 PATCH: 4 PATCH TOPICAL at 18:39

## 2024-11-09 RX ADMIN — Medication 250 ML: at 18:40

## 2024-11-09 RX ADMIN — POTASSIUM CHLORIDE 40 MEQ: 1500 TABLET, EXTENDED RELEASE ORAL at 18:38

## 2024-11-09 RX ADMIN — ASPIRIN 325 MG ORAL TABLET 325 MG: 325 PILL ORAL at 16:59

## 2024-11-09 RX ADMIN — OXYCODONE HYDROCHLORIDE 5 MG: 5 TABLET ORAL at 20:24

## 2024-11-09 SDOH — HEALTH STABILITY: MENTAL HEALTH
DO YOU FEEL STRESS - TENSE, RESTLESS, NERVOUS, OR ANXIOUS, OR UNABLE TO SLEEP AT NIGHT BECAUSE YOUR MIND IS TROUBLED ALL THE TIME - THESE DAYS?: NOT AT ALL

## 2024-11-09 SDOH — ECONOMIC STABILITY: INCOME INSECURITY: IN THE PAST 12 MONTHS HAS THE ELECTRIC, GAS, OIL, OR WATER COMPANY THREATENED TO SHUT OFF SERVICES IN YOUR HOME?: NO

## 2024-11-09 SDOH — SOCIAL STABILITY: SOCIAL INSECURITY: WITHIN THE LAST YEAR, HAVE YOU BEEN HUMILIATED OR EMOTIONALLY ABUSED IN OTHER WAYS BY YOUR PARTNER OR EX-PARTNER?: NO

## 2024-11-09 SDOH — SOCIAL STABILITY: SOCIAL INSECURITY
WITHIN THE LAST YEAR, HAVE YOU BEEN RAPED OR FORCED TO HAVE ANY KIND OF SEXUAL ACTIVITY BY YOUR PARTNER OR EX-PARTNER?: NO

## 2024-11-09 SDOH — SOCIAL STABILITY: SOCIAL INSECURITY: ARE THERE ANY APPARENT SIGNS OF INJURIES/BEHAVIORS THAT COULD BE RELATED TO ABUSE/NEGLECT?: NO

## 2024-11-09 SDOH — SOCIAL STABILITY: SOCIAL INSECURITY: HAS ANYONE EVER THREATENED TO HURT YOUR FAMILY OR YOUR PETS?: NO

## 2024-11-09 SDOH — ECONOMIC STABILITY: FOOD INSECURITY: WITHIN THE PAST 12 MONTHS, YOU WORRIED THAT YOUR FOOD WOULD RUN OUT BEFORE YOU GOT THE MONEY TO BUY MORE.: NEVER TRUE

## 2024-11-09 SDOH — SOCIAL STABILITY: SOCIAL INSECURITY: ABUSE: ADULT

## 2024-11-09 SDOH — SOCIAL STABILITY: SOCIAL INSECURITY: WITHIN THE LAST YEAR, HAVE YOU BEEN AFRAID OF YOUR PARTNER OR EX-PARTNER?: NO

## 2024-11-09 SDOH — SOCIAL STABILITY: SOCIAL INSECURITY: DO YOU FEEL UNSAFE GOING BACK TO THE PLACE WHERE YOU ARE LIVING?: NO

## 2024-11-09 SDOH — SOCIAL STABILITY: SOCIAL INSECURITY: DOES ANYONE TRY TO KEEP YOU FROM HAVING/CONTACTING OTHER FRIENDS OR DOING THINGS OUTSIDE YOUR HOME?: NO

## 2024-11-09 SDOH — SOCIAL STABILITY: SOCIAL INSECURITY: DO YOU FEEL ANYONE HAS EXPLOITED OR TAKEN ADVANTAGE OF YOU FINANCIALLY OR OF YOUR PERSONAL PROPERTY?: NO

## 2024-11-09 SDOH — SOCIAL STABILITY: SOCIAL INSECURITY
WITHIN THE LAST YEAR, HAVE YOU BEEN KICKED, HIT, SLAPPED, OR OTHERWISE PHYSICALLY HURT BY YOUR PARTNER OR EX-PARTNER?: NO

## 2024-11-09 SDOH — HEALTH STABILITY: PHYSICAL HEALTH: ON AVERAGE, HOW MANY DAYS PER WEEK DO YOU ENGAGE IN MODERATE TO STRENUOUS EXERCISE (LIKE A BRISK WALK)?: 0 DAYS

## 2024-11-09 SDOH — SOCIAL STABILITY: SOCIAL INSECURITY: HAVE YOU HAD ANY THOUGHTS OF HARMING ANYONE ELSE?: NO

## 2024-11-09 SDOH — HEALTH STABILITY: PHYSICAL HEALTH: ON AVERAGE, HOW MANY MINUTES DO YOU ENGAGE IN EXERCISE AT THIS LEVEL?: 30 MIN

## 2024-11-09 SDOH — SOCIAL STABILITY: SOCIAL INSECURITY: HAVE YOU HAD THOUGHTS OF HARMING ANYONE ELSE?: NO

## 2024-11-09 SDOH — SOCIAL STABILITY: SOCIAL INSECURITY: ARE YOU OR HAVE YOU BEEN THREATENED OR ABUSED PHYSICALLY, EMOTIONALLY, OR SEXUALLY BY ANYONE?: NO

## 2024-11-09 SDOH — ECONOMIC STABILITY: FOOD INSECURITY: WITHIN THE PAST 12 MONTHS, THE FOOD YOU BOUGHT JUST DIDN'T LAST AND YOU DIDN'T HAVE MONEY TO GET MORE.: NEVER TRUE

## 2024-11-09 ASSESSMENT — COLUMBIA-SUICIDE SEVERITY RATING SCALE - C-SSRS
6. HAVE YOU EVER DONE ANYTHING, STARTED TO DO ANYTHING, OR PREPARED TO DO ANYTHING TO END YOUR LIFE?: NO
1. IN THE PAST MONTH, HAVE YOU WISHED YOU WERE DEAD OR WISHED YOU COULD GO TO SLEEP AND NOT WAKE UP?: NO
2. HAVE YOU ACTUALLY HAD ANY THOUGHTS OF KILLING YOURSELF?: NO

## 2024-11-09 ASSESSMENT — HEART SCORE
AGE: 65+
ECG: NON-SPECIFIC REPOLARIZATION DISTURBANCE
TROPONIN: 1-3 TIMES NORMAL LIMIT
HISTORY: SLIGHTLY SUSPICIOUS
RISK FACTORS: 1-2 RISK FACTORS
HEART SCORE: 5

## 2024-11-09 ASSESSMENT — COGNITIVE AND FUNCTIONAL STATUS - GENERAL
MOBILITY SCORE: 22
DAILY ACTIVITIY SCORE: 24
MOBILITY SCORE: 22
CLIMB 3 TO 5 STEPS WITH RAILING: A LITTLE
PATIENT BASELINE BEDBOUND: NO
CLIMB 3 TO 5 STEPS WITH RAILING: A LITTLE
WALKING IN HOSPITAL ROOM: A LITTLE
WALKING IN HOSPITAL ROOM: A LITTLE

## 2024-11-09 ASSESSMENT — ACTIVITIES OF DAILY LIVING (ADL)
WALKS IN HOME: INDEPENDENT
DRESSING YOURSELF: INDEPENDENT
HEARING - RIGHT EAR: FUNCTIONAL
TOILETING: INDEPENDENT
LACK_OF_TRANSPORTATION: NO
FEEDING YOURSELF: INDEPENDENT
LACK_OF_TRANSPORTATION: NO
PATIENT'S MEMORY ADEQUATE TO SAFELY COMPLETE DAILY ACTIVITIES?: YES
HEARING - LEFT EAR: FUNCTIONAL
JUDGMENT_ADEQUATE_SAFELY_COMPLETE_DAILY_ACTIVITIES: YES
ADEQUATE_TO_COMPLETE_ADL: YES
GROOMING: INDEPENDENT
BATHING: INDEPENDENT

## 2024-11-09 ASSESSMENT — LIFESTYLE VARIABLES
SKIP TO QUESTIONS 9-10: 0
EVER HAD A DRINK FIRST THING IN THE MORNING TO STEADY YOUR NERVES TO GET RID OF A HANGOVER: NO
HAVE PEOPLE ANNOYED YOU BY CRITICIZING YOUR DRINKING: NO
TOTAL SCORE: 0
AUDIT-C TOTAL SCORE: 2
HOW OFTEN DO YOU HAVE A DRINK CONTAINING ALCOHOL: MONTHLY OR LESS
HAVE YOU EVER FELT YOU SHOULD CUT DOWN ON YOUR DRINKING: NO
EVER FELT BAD OR GUILTY ABOUT YOUR DRINKING: NO
HOW OFTEN DO YOU HAVE 6 OR MORE DRINKS ON ONE OCCASION: NEVER
HOW MANY STANDARD DRINKS CONTAINING ALCOHOL DO YOU HAVE ON A TYPICAL DAY: 3 OR 4
AUDIT-C TOTAL SCORE: 2

## 2024-11-09 ASSESSMENT — PAIN DESCRIPTION - PAIN TYPE: TYPE: ACUTE PAIN

## 2024-11-09 ASSESSMENT — PAIN SCALES - GENERAL
PAINLEVEL_OUTOF10: 6
PAINLEVEL_OUTOF10: 0 - NO PAIN
PAINLEVEL_OUTOF10: 9
PAINLEVEL_OUTOF10: 10 - WORST POSSIBLE PAIN

## 2024-11-09 ASSESSMENT — PAIN - FUNCTIONAL ASSESSMENT
PAIN_FUNCTIONAL_ASSESSMENT: 0-10

## 2024-11-09 ASSESSMENT — PAIN DESCRIPTION - FREQUENCY: FREQUENCY: CONSTANT/CONTINUOUS

## 2024-11-09 ASSESSMENT — PAIN DESCRIPTION - PROGRESSION: CLINICAL_PROGRESSION: NOT CHANGED

## 2024-11-09 ASSESSMENT — PAIN DESCRIPTION - DESCRIPTORS: DESCRIPTORS: SHARP

## 2024-11-09 ASSESSMENT — PAIN DESCRIPTION - LOCATION
LOCATION: CHEST
LOCATION: RIB CAGE

## 2024-11-09 ASSESSMENT — PAIN DESCRIPTION - ORIENTATION: ORIENTATION: MID

## 2024-11-09 NOTE — ED PROVIDER NOTES
History of Present Illness     History provided by: Patient  Limitations to History: None  External Records Reviewed with Brief Summary:  ED Notes    HPI:  Patient is a 68-year-old female with history of malignant neoplasm with bowel resection in 2021, chronic constipation, COPD, hypertension who presents emergency for chest pain for the past 5 days.  Patient states that the chest pain started right side underneath the rib cage and moved over to the left side of the rib cage underneath the breast and then move up to the center of her chest.  Patient describes the pain as sharp, shooting pain that is worse with movement along with pleuritic chest pain.  Patient states that it is worse when she moves forward and lays down however at rest pain does not intensify.  Patient does state that she has been feeling short of breath.  Patient has a history of 35-year smoking history.  Patient denies any hemoptysis, nausea,, vomiting, abdominal pain however the chest pain is very close to where she describes the chest pain.  Patient additionally states that that she feels pressure that pushes up.  Patient is not complaining of any feelings of acid in her throat.  Patient has no tenderness palpation of the bilateral lower extremities, no swelling.  Patient does have a history of recent bowel resection for malignant neoplasm.     Physical Exam   Triage vitals:  T 37 °C (98.6 °F)  HR 88  /76  RR 18  O2 97 % None (Room air)    General: Awake, alert, in no acute distress  Eyes: Gaze conjugate.  No scleral icterus or injection  HENT: Normo-cephalic, atraumatic. No stridor  CV: Regular rate, regular rhythm. Radial pulses 2+ bilaterally  Resp: Breathing non-labored, speaking in full sentences.  Clear to auscultation bilaterally  GI: Soft, non-distended, non-tender. No rebound or guarding.  MSK/Extremities: No gross bony deformities. Moving all extremities  Skin: Warm. Appropriate color  Neuro: Alert. Oriented. Face symmetric.  Speech is fluent.  Gross strength and sensation intact in b/l UE and LEs  Psych: Appropriate mood and affect    Medical Decision Making & ED Course   Medical Decision Making:  Patient is a 68-year-old female with history of malignant neoplasm with bowel resection in 2021, chronic constipation, COPD, hypertension who presents emergency for chest pain for the past 5 days.  Initially presented with a blood pressure 129/76, afebrile, heart rate of 88, respiratory rate of 18 satting at 97% on room air.  Differential diagnosis includes MI, unstable angina, pericarditis, pancreatitis, pneumonia, pulmonary embolism.  Lipase, magnesium, CMP, CBC, troponins.  CT angio for PE along with CT abdomen pelvis with IV contrast abdomen ordered.  EKG was also ordered.  Patient has significant amount of chest pain that is worse with movement.  She was given 4 mg of morphine IV, 325 mg aspirin.  Dates of evaluation can be seen in ED course work    Scoring Tools Utilized: Heart Score of 5       Social Determinants of Health which Significantly Impact Care: None identified The following actions were taken to address these social determinants: None    EKG Independent Interpretation: EKG interpreted by myself. Please see ED Course for full interpretation.    Independent Result Review and Interpretation: Relevant laboratory and radiographic results were reviewed and independently interpreted by myself.  As necessary, they are commented on in the ED Course.    Chronic conditions affecting the patient's care: As documented above in [facility]    The patient was discussed with the following consultants/services: None    Care Considerations: As documented above in [facility]    ED Course:  ED Course as of 11/09/24 1739   Sat Nov 09, 2024   1355 Lipase, Magnesium within normal was limits.  [YG]   1433 CBC is within normal is limits.  [YG]   1605 CT abdominal pelvis shows 1.  Fatty liver.  2. Right spigelian hernia increased in size since prior  study  containing fat and a loop of bowel without evidence for strangulation  or incarceration.  3. Old compression fractures of the superior endplates of T11 and L2.  4. Small bilateral renal cysts.  5. Postoperative change from prior bowel resection and reanastomosis  without evidence for bowel obstruction   [YG]   1609 EKG shows normal sinus rhythm, left axis deviation, heart rate of 89, ND of 134, QRS of 79, QTc of 462, no ST elevations, no ST depressions however T wave inversion seen in V4, V5, V1, V2, V3.  Comparing previous EKGs this appears to be a new finding. [YG]   1618 CT PE shows No evidence of pulmonary embolism. No aneurysm or dissection of  thoracic aorta.      Stable spiculated density in the apicoposterior segment of the left  upper lobe with several tiny granuloma seen within the lungs.  Centrilobular pulmonary emphysema is present.      There is a mucous plug identified within a bronchus in right lower  lobe.      Acute fractures of the left 6th through 8th ribs with acute fracture  of the anterior right 6th rib. There is also acute fracture of the  sternal body noted.      Nearly completely healed fractures of the right 4th through 7th ribs  with old compression fractures of the superior endplates of T5 and T6.   [YG]   1632 CMP shows a minimal hyperglycemia of 105, hyponatremia of 122.  Patient states that she has not been having much appetite over the past couple days and hyponatremia is likely due to hypovolemia.  Patient having hypokalemia of 3.2.  Initial troponins elevated at 21, baseline is not elevated.  CBC is unremarkable. [YG]   1634 On reevaluation, patient states that she has had several falls and it is possible that she acute fractures 6 through 8th rib along with the sternal body fracture from these falls. [YG]   1722 CT shows no evidence of pneumonia, leukocytosis, afebrile which makes this less concerning.  Pericarditis was considered however the chest pain is positional based  on movement and does not improve when she is moving forward.  CT is negative which makes this less concerning for pulm embolism.  Additionally patient is satting well at 94% on room air.  Patient does have positional chest pain that is worse with movement.  Reevaluation after CT, patient then told the provider about several falls within this past week.  Trauma surgery was consulted and agreed to admit this patient for sternal fracture.  Hospitalist will be consulted by Trauma surgery for acute ischemic EKG changes.  It may be likely that the sternal fracture caused the elevated troponin.  It is possible that the traumatic sternal fracture and rib fractures could be causing the elevation in troponin however patient should be admitted with hospitalist consult for changes in EKGs. [YG]   1727 Patient vitally stable prior to being admitted to the trauma surgery team. [YG]      ED Course User Index  [YG] Vani Lora MD         Diagnoses as of 11/09/24 1739   Closed fracture of sternum, unspecified portion of sternum, initial encounter   Traumatic closed nondisplaced fracture of multiple ribs of left side, initial encounter     Disposition   As a result of their workup, the patient will require admission to the hospital.  The patient was informed of her diagnosis.  The patient was given the opportunity to ask questions and I answered them. The patient agreed to be admitted to the hospital.    Procedures   Procedures    Patient seen and discussed with ED attending physician.    Vani Lora MD  Emergency Medicine     Vani Lora MD  Resident  11/09/24 1739

## 2024-11-09 NOTE — CONSULTS
Internal Medicine Consult Note      Subjective/History     Tuyet Ramirez is a 69 y.o. female with a history of bowel resection, COPD, hypertension presented with chest pain after several recent falls.  Patient states that she has fallen several times over the past few weeks from loss of balance.  She says that she did not notice any acute chest pain after her recent falls but said she has had the chest pain for the last few days and it became so severe that she had to come to the hospital.  She states that it is somewhat difficult for her to breathe although she does not feel short of breath.  Denies hemoptysis, nausea, vomiting.  Drinks alcohol socially, smokes about 15 cigarettes/day.    ROS: Negative unless stated above    Hospital Course: Initial vitals in the ED were unremarkable, patient was saturating well on room air.  Lipase and magnesium are within normal limits, CBC unremarkable, CMP significant for sodium of 122 and potassium 3.2, kidney function within normal limits, LFTs unremarkable.  Slightly elevated at 21.  CTA chest negative for PE but showing acute fractures of left 6th through 8th rib and fracture of the sternal body with old healed fractures of right 4th through 7th ribs.  Trauma team was notified and patient be admitted to the hospital with medicine on consult for hyponatremia management.    Surgical history: As below  Family history: As below  Risk/Social factors:     Past Medical History:   Diagnosis Date    Nausea 01/14/2022    Daily nausea       Past Surgical History:   Procedure Laterality Date    OTHER SURGICAL HISTORY  11/04/2021    Colonoscopy    OTHER SURGICAL HISTORY  11/04/2021    Esophagogastroduodenoscopy    OTHER SURGICAL HISTORY  01/14/2022    Colectomy subtotal    OTHER SURGICAL HISTORY  01/14/2022    Small bowel resection    OTHER SURGICAL HISTORY  12/01/2021    Tubal ligation       Family history:family history is not on file.    Objective     Physical Exam  Vitals reviewed.    Constitutional:       Appearance: Normal appearance.   Eyes:      Pupils: Pupils are equal, round, and reactive to light.   Cardiovascular:      Rate and Rhythm: Normal rate and regular rhythm.   Pulmonary:      Effort: Pulmonary effort is normal.      Breath sounds: Wheezing present.   Musculoskeletal:         General: Tenderness (bilateral chest wall) present.      Right lower leg: No edema.      Left lower leg: No edema.   Skin:     General: Skin is warm and dry.   Neurological:      General: No focal deficit present.      Mental Status: She is alert and oriented to person, place, and time.   Psychiatric:         Mood and Affect: Mood normal.       Last Recorded Vitals  Blood pressure 131/66, pulse 86, temperature 37 °C (98.6 °F), temperature source Temporal, resp. rate 16, height 1.524 m (5'), weight 52.2 kg (115 lb), SpO2 94%.  Intake/Output last 3 Shifts:  No intake/output data recorded.    Last CBC & BMP  Lab Results   Component Value Date    GLUCOSE 105 (H) 11/09/2024    CALCIUM 9.5 11/09/2024     (L) 11/09/2024    K 3.2 (L) 11/09/2024    CO2 30 11/09/2024    CL 85 (L) 11/09/2024    BUN 15 11/09/2024    CREATININE 0.96 11/09/2024     Lab Results   Component Value Date    WBC 8.7 11/09/2024    HGB 13.6 11/09/2024    HCT 39.5 11/09/2024    MCV 92 11/09/2024     11/09/2024         Assessment / Plan   Tuyet Ramirez is a 69 y.o. female with a history of colon cancer s/p bowel resection, COPD, hypertension presented with chest pain after several recent falls.  Medicine was consulted for management of hyponatremia.    #Hyponatremia  -Likely secondary to poor p.o. intake vs ADH release from pain response   -Sodium 122 on admission  -Obtain urine electrolytes, urine and serum osm for additional info on etiology  -Will hold off on IVF for now and await urine studies; consider mIVF with NS if studies are not indicative of SIADH picture    #Hypokalemia  -Potassium 3.2 on admission; replete and monitor  chemistry    #Acute L 6-8 and R 6th rib fractures  -Management per trauma team    #HTN  #Hyperlipidemia  #Tobacco use disorder  -Continue home carvedilol, Crestor  -Smokes ~15 cigarettes/day; pt denies need for nicotine patch      Chris Strickland MD  PGY-1, Internal Medicine    This is a preliminary note, please await attending attestation for final A/P

## 2024-11-09 NOTE — H&P
History Of Present Illness  Tuyet Ramirez is a 69 y.o. female presenting with chest pain. Workup/imaging in ED identified acute left sided rib fractures 6-8, non displaced) and acute right 6th rib fracture along with fracture through sternal body. CT PE was negative.   Admitted to trauma service with consult to medicine.    She has a past medical history significant for left sided colon adenocarcinoma s/p laparoscopic colectomy (2021), COPD, anxiety/depression, B12 and folate deficiencies, former ETOH and tobacco abuse, and constipation.      Patient states that she frequently falls at home. Recalls falling in her bedroom and on to the bathroom floor. Last fall was Thursday. Thinks she hit her upper body/torso. Denies ever hitting her head or losing consciousness.     Had her mother bring her to ED due to this ongoing chest pain that is exacerbated by movement/coughing. Denies fever or chills at home. Denies changes in bowel/bladder - normal dark brown BM this morning per patient report.     Primary survey intact. Airway protected, bilateral breath sounds clear on room air (lower spO2 at 89%), 2+ pulses throughout. A&O x4, GCS 15, fully examined.      Past Medical History  Past Medical History:   Diagnosis Date    Nausea 01/14/2022    Daily nausea       Surgical History  Past Surgical History:   Procedure Laterality Date    OTHER SURGICAL HISTORY  11/04/2021    Colonoscopy    OTHER SURGICAL HISTORY  11/04/2021    Esophagogastroduodenoscopy    OTHER SURGICAL HISTORY  01/14/2022    Colectomy subtotal    OTHER SURGICAL HISTORY  01/14/2022    Small bowel resection    OTHER SURGICAL HISTORY  12/01/2021    Tubal ligation        Social History  She reports that she has been smoking cigarettes. She has been exposed to tobacco smoke. She has never used smokeless tobacco. She reports current alcohol use. She reports current drug use. Frequency: 21.00 times per week. Drug: Marijuana.    Family History  No family history on  file.     Allergies  Patient has no known allergies.    Review of Systems   All other systems reviewed and are negative.       Physical Exam  Constitutional:       General: She is not in acute distress.     Appearance: She is normal weight.   HENT:      Head: Normocephalic.      Mouth/Throat:      Comments: Teeth in poor repair   Eyes:      General: No scleral icterus.     Conjunctiva/sclera: Conjunctivae normal.   Cardiovascular:      Rate and Rhythm: Normal rate and regular rhythm.      Pulses: Normal pulses.      Comments: NSR 80 on telemetry   Pulmonary:      Effort: Pulmonary effort is normal. No respiratory distress.      Breath sounds: Normal breath sounds.      Comments: Slightly diminished on left lower compared to right, faint expiratory wheeze, but subsided. Moist, non productive cough  Abdominal:      General: Bowel sounds are normal. There is no distension.      Palpations: Abdomen is soft.      Tenderness: There is no abdominal tenderness.   Musculoskeletal:         General: Normal range of motion.      Cervical back: Normal range of motion. No rigidity or tenderness.      Right lower leg: No edema.      Left lower leg: No edema.      Comments: Pain with palpation to mid sternum  No C-T-L spine tenderness to palpation, no step offs, no deformities    Skin:     General: Skin is warm and dry.      Comments: Yellow ecchymotic area just right of sternum, inferior medial to clavicle on right   Neurological:      General: No focal deficit present.      Mental Status: She is alert and oriented to person, place, and time.   Psychiatric:         Mood and Affect: Mood normal.         Behavior: Behavior normal.          Last Recorded Vitals  Blood pressure 131/66, pulse 86, temperature 37 °C (98.6 °F), temperature source Temporal, resp. rate 16, height 1.524 m (5'), weight 52.2 kg (115 lb), SpO2 94%.    Relevant Results  Results for orders placed or performed during the hospital encounter of 11/09/24 (from the  past 24 hours)   CBC and Auto Differential   Result Value Ref Range    WBC 8.7 4.4 - 11.3 x10*3/uL    nRBC 0.0 0.0 - 0.0 /100 WBCs    RBC 4.31 4.00 - 5.20 x10*6/uL    Hemoglobin 13.6 12.0 - 16.0 g/dL    Hematocrit 39.5 36.0 - 46.0 %    MCV 92 80 - 100 fL    MCH 31.6 26.0 - 34.0 pg    MCHC 34.4 32.0 - 36.0 g/dL    RDW 12.5 11.5 - 14.5 %    Platelets 183 150 - 450 x10*3/uL    Neutrophils % 64.3 40.0 - 80.0 %    Immature Granulocytes %, Automated 0.3 0.0 - 0.9 %    Lymphocytes % 19.4 13.0 - 44.0 %    Monocytes % 14.4 2.0 - 10.0 %    Eosinophils % 1.3 0.0 - 6.0 %    Basophils % 0.3 0.0 - 2.0 %    Neutrophils Absolute 5.57 1.20 - 7.70 x10*3/uL    Immature Granulocytes Absolute, Automated 0.03 0.00 - 0.70 x10*3/uL    Lymphocytes Absolute 1.68 1.20 - 4.80 x10*3/uL    Monocytes Absolute 1.25 (H) 0.10 - 1.00 x10*3/uL    Eosinophils Absolute 0.11 0.00 - 0.70 x10*3/uL    Basophils Absolute 0.03 0.00 - 0.10 x10*3/uL   Comprehensive Metabolic Panel   Result Value Ref Range    Glucose 105 (H) 74 - 99 mg/dL    Sodium 122 (L) 136 - 145 mmol/L    Potassium 3.2 (L) 3.5 - 5.3 mmol/L    Chloride 85 (L) 98 - 107 mmol/L    Bicarbonate 30 21 - 32 mmol/L    Anion Gap 10 10 - 20 mmol/L    Urea Nitrogen 15 6 - 23 mg/dL    Creatinine 0.96 0.50 - 1.05 mg/dL    eGFR 65 >60 mL/min/1.73m*2    Calcium 9.5 8.6 - 10.3 mg/dL    Albumin 3.9 3.4 - 5.0 g/dL    Alkaline Phosphatase 60 33 - 136 U/L    Total Protein 7.1 6.4 - 8.2 g/dL    AST 21 9 - 39 U/L    Bilirubin, Total 1.1 0.0 - 1.2 mg/dL    ALT 15 7 - 45 U/L   Magnesium   Result Value Ref Range    Magnesium 1.67 1.60 - 2.40 mg/dL   Troponin I, High Sensitivity, Initial   Result Value Ref Range    Troponin I, High Sensitivity 21 (H) 0 - 13 ng/L   Lipase   Result Value Ref Range    Lipase 11 9 - 82 U/L            Assessment/Plan   Assessment & Plan  Traumatic closed nondisplaced fracture of multiple ribs of left side, initial encounter    Closed fracture of body of sternum with routine healing      69  year old female with a past medical history significant for left sided colon adenocarcinoma s/p laparoscopic colectomy (2021), COPD, anxiety/depression, B12 and folate deficiencies, former ETOH and tobacco abuse, and constipation presented to [facility] ED on 11/9 from home with chest pain. Diagnosed with acute sternal and left/rib rib fractures. Admitted to trauma.    #acute left 6-8 and acute right 6th rib fracture   #acute sternal fracture  - okay for diet  - trend troponin x2 more   - Echocardiogram  - admit to telemetry   - multimodal pain control     #hyponatremia - likely 2/2 poor PO intake  - oral hydration solution  - repeat BMP in AM  - appreciate medicine recs for any additional workup    #PMHx: HTN, anxiety/depression  - appreciate medicine recs     PT/OT    Dispo:  Admit to telemetry     Ppx:   Lovenox    The patient was discussed with the attending surgeon Dr. Fisher who agrees with the plan.       I spent 35 minutes in the professional and overall care of this patient.      Kina Bruce, MARISA-CNP

## 2024-11-09 NOTE — ED PROVIDER NOTES
History of Present Illness     History provided by: Patient  Limitations to History: None  External Records Reviewed with Brief Summary:  ED Notes    HPI:  Patient is a 69-year-old female with history of malignant neoplasm with bowel resection in 2021, chronic constipation, COPD, hypertension who presents emergency for chest pain for the past 5 days.  Patient states that the chest pain started right side underneath the rib cage and moved over to the left side of the rib cage underneath the breast and then move up to the center of her chest.  Patient describes the pain as sharp, shooting pain that is worse with movement along with pleuritic chest pain.  Patient states that it is worse when she moves forward and lays down however at rest pain does not intensify.  Patient does state that she has been feeling short of breath.  Patient has a history of 35-year smoking history.  Patient denies any hemoptysis, nausea,, vomiting, abdominal pain however the chest pain is very close to where she describes the chest pain.  Patient additionally states that that she feels pressure that pushes up.  Patient is not complaining of any feelings of acid in her throat.  Patient has no tenderness palpation of the bilateral lower extremities, no swelling.  Patient does have a history of recent bowel resection for malignant neoplasm.     Physical Exam   Triage vitals:  T 37 °C (98.6 °F)  HR 88  /76  RR 18  O2 97 % None (Room air)    General: Awake, alert, in no acute distress  Eyes: Gaze conjugate.  No scleral icterus or injection  HENT: Normo-cephalic, atraumatic. No stridor  CV: Regular rate, regular rhythm. Radial pulses 2+ bilaterally  Resp: Breathing non-labored, speaking in full sentences.  Clear to auscultation bilaterally  GI: Soft, non-distended, non-tender. No rebound or guarding.  MSK/Extremities: No gross bony deformities. Moving all extremities  Skin: Warm. Appropriate color  Neuro: Alert. Oriented. Face symmetric.  Speech is fluent.  Gross strength and sensation intact in b/l UE and LEs  Psych: Appropriate mood and affect    Medical Decision Making & ED Course   Medical Decision Making:  Patient is a 69-year-old female with history of malignant neoplasm with bowel resection in 2021, chronic constipation, COPD, hypertension who presents emergency for chest pain for the past 5 days.  Differential diagnosis includes MI, unstable angina, PE, GERD, cholecystitis, pancreatitis.  Lipase, magnesium, CMP, CBC, serial troponin, EKG along with CT abdomen pelvis and CT PE study was ordered.  Patient presents emergency room for stable blood pressure 120/59, afebrile, heart rate of 80, satting at 96% on room air.  Patient states that she is in significant mount of pain and was given 4 mg of morphine IV.  Updates can be seen in ED course work.  ----  Scoring Tools Utilized: None       Social Determinants of Health which Significantly Impact Care: None identified The following actions were taken to address these social determinants: None    EKG Independent Interpretation: EKG interpreted by myself. Please see ED Course for full interpretation.    Independent Result Review and Interpretation: Relevant laboratory and radiographic results were reviewed and independently interpreted by myself.  As necessary, they are commented on in the ED Course.    Chronic conditions affecting the patient's care: As documented above in [facility]    The patient was discussed with the following consultants/services: None    Care Considerations: As documented above in [facility]    ED Course:  ED Course as of 11/09/24 1607   Sat Nov 09, 2024   1355 SODIUM(!): 122 [YG]   1355 Lipase, Magnesium within normal was limits.  [YG]   1433 GLUCOSE(!): 105 [YG]   1433 POTASSIUM(!): 3.2 [YG]   1433 CHLORIDE(!): 85 [YG]   1433 Troponin I, High Sensitivity(!): 21 [YG]   1433 CBC is within normal is limits.  [YG]   1605 CT abdominal pelvis shows 1.  Fatty liver.  2. Right spigelian hernia  increased in size since prior study  containing fat and a loop of bowel without evidence for strangulation  or incarceration.  3. Old compression fractures of the superior endplates of T11 and L2.  4. Small bilateral renal cysts.  5. Postoperative change from prior bowel resection and reanastomosis  without evidence for bowel obstruction   [YG]      ED Course User Index  [YG] Vani Lora MD     Disposition   {ED Disposition:29800}    Procedures   Procedures    {ED Provider Level (Optional):98496}    Vani Lora MD  Emergency Medicine

## 2024-11-10 ENCOUNTER — APPOINTMENT (OUTPATIENT)
Dept: RADIOLOGY | Facility: HOSPITAL | Age: 69
End: 2024-11-10
Payer: MEDICARE

## 2024-11-10 VITALS
SYSTOLIC BLOOD PRESSURE: 153 MMHG | DIASTOLIC BLOOD PRESSURE: 75 MMHG | WEIGHT: 115 LBS | HEIGHT: 60 IN | HEART RATE: 78 BPM | TEMPERATURE: 96.8 F | OXYGEN SATURATION: 94 % | BODY MASS INDEX: 22.58 KG/M2 | RESPIRATION RATE: 20 BRPM

## 2024-11-10 LAB
ANION GAP SERPL CALC-SCNC: 11 MMOL/L (ref 10–20)
BUN SERPL-MCNC: 12 MG/DL (ref 6–23)
CALCIUM SERPL-MCNC: 9.3 MG/DL (ref 8.6–10.3)
CHLORIDE SERPL-SCNC: 93 MMOL/L (ref 98–107)
CHLORIDE UR-SCNC: <15 MMOL/L
CHLORIDE/CREATININE (MMOL/G) IN URINE: NORMAL
CO2 SERPL-SCNC: 30 MMOL/L (ref 21–32)
CREAT SERPL-MCNC: 0.71 MG/DL (ref 0.5–1.05)
CREAT UR-MCNC: 69.5 MG/DL (ref 20–320)
EGFRCR SERPLBLD CKD-EPI 2021: >90 ML/MIN/1.73M*2
ERYTHROCYTE [DISTWIDTH] IN BLOOD BY AUTOMATED COUNT: 12.6 % (ref 11.5–14.5)
GLUCOSE SERPL-MCNC: 97 MG/DL (ref 74–99)
HCT VFR BLD AUTO: 39.2 % (ref 36–46)
HGB BLD-MCNC: 13.4 G/DL (ref 12–16)
MCH RBC QN AUTO: 31.8 PG (ref 26–34)
MCHC RBC AUTO-ENTMCNC: 34.2 G/DL (ref 32–36)
MCV RBC AUTO: 93 FL (ref 80–100)
NRBC BLD-RTO: 0 /100 WBCS (ref 0–0)
OSMOLALITY SERPL: 260 MOSM/KG (ref 280–300)
OSMOLALITY UR: 297 MOSM/KG (ref 200–1200)
PLATELET # BLD AUTO: 148 X10*3/UL (ref 150–450)
POTASSIUM SERPL-SCNC: 4.7 MMOL/L (ref 3.5–5.3)
POTASSIUM UR-SCNC: 18 MMOL/L
POTASSIUM/CREAT UR-RTO: 26 MMOL/G CREAT
RBC # BLD AUTO: 4.21 X10*6/UL (ref 4–5.2)
SODIUM SERPL-SCNC: 129 MMOL/L (ref 136–145)
SODIUM UR-SCNC: <10 MMOL/L
SODIUM/CREAT UR-RTO: NORMAL
WBC # BLD AUTO: 5.7 X10*3/UL (ref 4.4–11.3)

## 2024-11-10 PROCEDURE — 83935 ASSAY OF URINE OSMOLALITY: CPT | Mod: PARLAB

## 2024-11-10 PROCEDURE — 2500000004 HC RX 250 GENERAL PHARMACY W/ HCPCS (ALT 636 FOR OP/ED): Performed by: INTERNAL MEDICINE

## 2024-11-10 PROCEDURE — 99232 SBSQ HOSP IP/OBS MODERATE 35: CPT | Performed by: SURGERY

## 2024-11-10 PROCEDURE — 36415 COLL VENOUS BLD VENIPUNCTURE: CPT | Performed by: REGISTERED NURSE

## 2024-11-10 PROCEDURE — 85027 COMPLETE CBC AUTOMATED: CPT | Performed by: REGISTERED NURSE

## 2024-11-10 PROCEDURE — 99232 SBSQ HOSP IP/OBS MODERATE 35: CPT | Performed by: INTERNAL MEDICINE

## 2024-11-10 PROCEDURE — 2500000005 HC RX 250 GENERAL PHARMACY W/O HCPCS: Performed by: REGISTERED NURSE

## 2024-11-10 PROCEDURE — 2500000001 HC RX 250 WO HCPCS SELF ADMINISTERED DRUGS (ALT 637 FOR MEDICARE OP): Performed by: REGISTERED NURSE

## 2024-11-10 PROCEDURE — 82436 ASSAY OF URINE CHLORIDE: CPT

## 2024-11-10 PROCEDURE — 97161 PT EVAL LOW COMPLEX 20 MIN: CPT | Mod: GP

## 2024-11-10 PROCEDURE — 97165 OT EVAL LOW COMPLEX 30 MIN: CPT | Mod: GO

## 2024-11-10 PROCEDURE — 2500000004 HC RX 250 GENERAL PHARMACY W/ HCPCS (ALT 636 FOR OP/ED): Performed by: REGISTERED NURSE

## 2024-11-10 PROCEDURE — 71046 X-RAY EXAM CHEST 2 VIEWS: CPT

## 2024-11-10 PROCEDURE — 82374 ASSAY BLOOD CARBON DIOXIDE: CPT | Performed by: REGISTERED NURSE

## 2024-11-10 PROCEDURE — 2500000002 HC RX 250 W HCPCS SELF ADMINISTERED DRUGS (ALT 637 FOR MEDICARE OP, ALT 636 FOR OP/ED): Performed by: REGISTERED NURSE

## 2024-11-10 PROCEDURE — 71046 X-RAY EXAM CHEST 2 VIEWS: CPT | Performed by: RADIOLOGY

## 2024-11-10 PROCEDURE — 1200000002 HC GENERAL ROOM WITH TELEMETRY DAILY

## 2024-11-10 RX ORDER — SODIUM CHLORIDE 9 MG/ML
100 INJECTION, SOLUTION INTRAVENOUS CONTINUOUS
Status: DISCONTINUED | OUTPATIENT
Start: 2024-11-10 | End: 2024-11-10

## 2024-11-10 RX ADMIN — GABAPENTIN 100 MG: 100 CAPSULE ORAL at 15:27

## 2024-11-10 RX ADMIN — VITAM B12 500 MCG: 100 TAB at 08:50

## 2024-11-10 RX ADMIN — ENOXAPARIN SODIUM 30 MG: 30 INJECTION SUBCUTANEOUS at 08:47

## 2024-11-10 RX ADMIN — ACETAMINOPHEN 975 MG: 325 TABLET, FILM COATED ORAL at 06:24

## 2024-11-10 RX ADMIN — Medication 250 ML: at 06:26

## 2024-11-10 RX ADMIN — GABAPENTIN 100 MG: 100 CAPSULE ORAL at 20:44

## 2024-11-10 RX ADMIN — GABAPENTIN 100 MG: 100 CAPSULE ORAL at 08:49

## 2024-11-10 RX ADMIN — Medication 250 ML: at 15:27

## 2024-11-10 RX ADMIN — SODIUM CHLORIDE 100 ML/HR: 9 INJECTION, SOLUTION INTRAVENOUS at 08:47

## 2024-11-10 RX ADMIN — OXYCODONE HYDROCHLORIDE 5 MG: 5 TABLET ORAL at 10:46

## 2024-11-10 RX ADMIN — SERTRALINE HYDROCHLORIDE 50 MG: 50 TABLET ORAL at 08:49

## 2024-11-10 RX ADMIN — ACETAMINOPHEN 975 MG: 325 TABLET, FILM COATED ORAL at 00:10

## 2024-11-10 RX ADMIN — OXYCODONE HYDROCHLORIDE 5 MG: 5 TABLET ORAL at 18:08

## 2024-11-10 RX ADMIN — CARVEDILOL 12.5 MG: 12.5 TABLET, FILM COATED ORAL at 08:49

## 2024-11-10 RX ADMIN — MAGNESIUM OXIDE TAB 400 MG (241.3 MG ELEMENTAL MG) 400 MG: 400 (241.3 MG) TAB at 08:50

## 2024-11-10 RX ADMIN — Medication 250 ML: at 21:39

## 2024-11-10 RX ADMIN — LIDOCAINE 1 PATCH: 4 PATCH TOPICAL at 08:47

## 2024-11-10 RX ADMIN — ENOXAPARIN SODIUM 30 MG: 30 INJECTION SUBCUTANEOUS at 20:44

## 2024-11-10 RX ADMIN — OXYCODONE HYDROCHLORIDE 5 MG: 5 TABLET ORAL at 05:18

## 2024-11-10 RX ADMIN — PANTOPRAZOLE SODIUM 40 MG: 40 TABLET, DELAYED RELEASE ORAL at 15:27

## 2024-11-10 RX ADMIN — PANTOPRAZOLE SODIUM 40 MG: 40 TABLET, DELAYED RELEASE ORAL at 06:25

## 2024-11-10 RX ADMIN — ACETAMINOPHEN 975 MG: 325 TABLET, FILM COATED ORAL at 12:29

## 2024-11-10 RX ADMIN — ROSUVASTATIN CALCIUM 10 MG: 10 TABLET, FILM COATED ORAL at 08:47

## 2024-11-10 ASSESSMENT — COGNITIVE AND FUNCTIONAL STATUS - GENERAL
DAILY ACTIVITIY SCORE: 24
HELP NEEDED FOR BATHING: A LITTLE
DAILY ACTIVITIY SCORE: 21
MOBILITY SCORE: 19
STANDING UP FROM CHAIR USING ARMS: A LITTLE
TURNING FROM BACK TO SIDE WHILE IN FLAT BAD: A LITTLE
TOILETING: A LITTLE
WALKING IN HOSPITAL ROOM: A LITTLE
MOVING TO AND FROM BED TO CHAIR: A LITTLE
CLIMB 3 TO 5 STEPS WITH RAILING: A LITTLE
DRESSING REGULAR LOWER BODY CLOTHING: A LITTLE
CLIMB 3 TO 5 STEPS WITH RAILING: A LITTLE
MOBILITY SCORE: 22
WALKING IN HOSPITAL ROOM: A LITTLE

## 2024-11-10 ASSESSMENT — PAIN SCALES - GENERAL
PAINLEVEL_OUTOF10: 7
PAINLEVEL_OUTOF10: 10 - WORST POSSIBLE PAIN
PAINLEVEL_OUTOF10: 9
PAINLEVEL_OUTOF10: 7
PAINLEVEL_OUTOF10: 5 - MODERATE PAIN
PAINLEVEL_OUTOF10: 8
PAINLEVEL_OUTOF10: 7
PAINLEVEL_OUTOF10: 8
PAINLEVEL_OUTOF10: 8

## 2024-11-10 ASSESSMENT — PAIN - FUNCTIONAL ASSESSMENT
PAIN_FUNCTIONAL_ASSESSMENT: 0-10
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
## 2024-11-10 NOTE — PROGRESS NOTES
Subjective     Patient seen and examined. No acute overnight events. Noted increase in sodium to 129. She reports some pain in her ribs this morning but it is adequately controlled. Counseled her on the nature of her hyponatremia and that she may need to reduce her free water intake slightly on discharge.      Assessment / Plan   Tuyet Ramirez is a 69 y.o. female with a history of colon cancer s/p bowel resection, COPD, hypertension presented with chest pain after several recent falls.  Medicine was consulted for management of hyponatremia.     #Hyponatremia, improving   -Urine studies indicative of hypovolemic hyponatremia  -S/p 500 cc normal saline with improvement of sodium to 129  -Discontinue maintenance fluids, continue to monitor     #Hypokalemia  -Potassium 3.2 on admission; replete and monitor chemistry     #Acute L 6-8 and R 6th rib fractures  -Management per trauma team    #Incidental CT findings  -Spiculated density in BRIGHT; continue to monitor outpatient     #HTN  #Hyperlipidemia  #Tobacco use disorder  -Continue home carvedilol, Crestor  -Smokes ~15 cigarettes/day; pt denies need for nicotine patch      Objective   Physical Exam  Vitals reviewed.   Constitutional:       Appearance: Normal appearance.   Cardiovascular:      Rate and Rhythm: Normal rate and regular rhythm.   Pulmonary:      Effort: Pulmonary effort is normal.      Breath sounds: Normal breath sounds.   Abdominal:      General: Abdomen is flat.      Palpations: Abdomen is soft.   Musculoskeletal:      Right lower leg: No edema.      Left lower leg: No edema.   Skin:     General: Skin is warm and dry.   Neurological:      General: No focal deficit present.      Mental Status: She is alert and oriented to person, place, and time.       Last Recorded Vitals  Blood pressure 152/80, pulse 69, temperature 35.9 °C (96.6 °F), resp. rate 20, height 1.524 m (5'), weight 52.2 kg (115 lb), SpO2 92%.  Intake/Output last 3 Shifts:  I/O last 3 completed  shifts:  In: 250 (4.8 mL/kg) [P.O.:250]  Out: - (0 mL/kg)   Weight: 52.2 kg     Last CBC & BMP  Lab Results   Component Value Date    GLUCOSE 115 (H) 11/09/2024    CALCIUM 9.3 11/09/2024     (L) 11/09/2024    K 3.8 11/09/2024    CO2 30 11/09/2024    CL 87 (L) 11/09/2024    BUN 16 11/09/2024    CREATININE 0.91 11/09/2024     Lab Results   Component Value Date    WBC 8.7 11/09/2024    HGB 13.6 11/09/2024    HCT 39.5 11/09/2024    MCV 92 11/09/2024     11/09/2024

## 2024-11-10 NOTE — H&P
History Of Present Illness  Tuyet Ramirez is a 69 y.o. female presenting with chest pain and recent falls.  She was brought to the emergency room by her 87-year-old mother with whom she lives in the community.  The patient does admit to frequent falls and she fell approximately 48 hours ago in her bedroom landing on the floor.  She was evaluated for possible pulmonary embolus cardiac event when CT of the chest revealed a sternal fracture as well as acute 6 through the eighth rib fractures.  She does recall the event.  She did not have any chest pain shortness of breath lightheadedness nausea vomiting at that time.  She does smoke up to a pack of cigarettes per day.  She has a history of colon cancer treated by laparoscopic colectomy.  History noted of hypertension and COPD.  At the time of the exam the patient was again complaining of chest wall pain only.  She denies shortness of breath.  Primary survey had revealed airway intact.  Breath sounds were equal without rales rhonchi's wheezes.  Pulses were intact throughout.  Maksim Coma Scale was 15 without focal neurological deficits.  Past Medical History  Past Medical History:   Diagnosis Date    Nausea 01/14/2022    Daily nausea       Surgical History  Past Surgical History:   Procedure Laterality Date    OTHER SURGICAL HISTORY  11/04/2021    Colonoscopy    OTHER SURGICAL HISTORY  11/04/2021    Esophagogastroduodenoscopy    OTHER SURGICAL HISTORY  01/14/2022    Colectomy subtotal    OTHER SURGICAL HISTORY  01/14/2022    Small bowel resection    OTHER SURGICAL HISTORY  12/01/2021    Tubal ligation        Social History  She reports that she has been smoking cigarettes. She has been exposed to tobacco smoke. She has never used smokeless tobacco. She reports current alcohol use. She reports current drug use. Frequency: 21.00 times per week. Drug: Marijuana.    Family History  No family history on file.     Allergies  Patient has no known allergies.    Review of  Systems 10 review of systems otherwise negative     Physical Exam  GENERAL  MENTAL STATUS - Alert. GENERAL APPEARANCE - Cooperative and well groomed. ORIENTATION - Oriented X4. BUILD & NUTRITION - Well nourished and well developed. HYDRATION - Well hydrated.    INTEGUMENTARY  GENERAL CHARACTERISTICS - Overall examination of the patient's skin reveals no rashes. COLOR - not icteric. SKIN MOISTURE - normal skin moisture. TEMPERATURE - normal worth is noted.    HEAD AND NECK  HEAD  HEAD SHAPE - Atraumatic and normocephalic.  TRACHEA - midline.  THYROID  GLAND CHARACTERISTICS - normal size and consistency and no palpable nodules.  Dentition poor  EYE  SCLERA/CONJUNCTIVA - BILATERAL - Anicteric.    CHEST AND LUNG EXAM  AUSCULTATION -  BREATH SOUNDS - Clear.  Slightly diminished in lower lung fields  Ecchymosis noted on sternum.  Tender to touch  BREAST - Did not examine.    CARDIOVASCULAR  AUSCULTATION: RHYTHM - Regular. HEART SOUNDS - Normal heart sounds.  MURMURS & OTHER HEART SOUNDS - Auscultation of the heart reveals regular rate and no murmurs.    ABDOMEN  PALPATION/PERCUSSION - Palpation and percussion of the abdomen reveal soft, non tender, no mass and no hepatosplenomegaly.    LYMPHATIC  GENERAL LYMPHATICS  BREAST LYMPHATIC EXAM - No cervical adenopathy, supraclavicular adenopathy or axilliary adenopathy.     Last Recorded Vitals  Blood pressure 147/70, pulse 73, temperature 37.1 °C (98.8 °F), temperature source Temporal, resp. rate 20, height 1.524 m (5'), weight 52.2 kg (115 lb), SpO2 94%.    Relevant Results        I reviewed the diagnostic imaging including CT of the chest.     Assessment/Plan   Assessment & Plan  Traumatic closed nondisplaced fracture of multiple ribs of left side, initial encounter    Closed fracture of body of sternum with routine healing      Laboratory studies demonstrated white blood cell count of 8.7.  H&H 39.5 of hematocrit and 13.6 hemoglobin.  Troponin 21 most likely secondary to  trauma.  Sodium noted at 122.  Potassium 3.2 no evidence of acute kidney injury at this time.   Patient is admitted to telemetry.  Medical consultation reviewed and appreciated.  Plan PT OT evaluation.Hopefully return to home with home health.  DVT prophylaxis in place  I spent 35 minutes in the professional and overall care of this patient.      Melvin Fisher MD

## 2024-11-10 NOTE — PROGRESS NOTES
Tuyet Ramirez is a 69 y.o. female on day 1 of admission presenting with Traumatic closed nondisplaced fracture of multiple ribs of left side, initial encounter.    Subjective   Complains only of chest wall pain overlying rib fractures and sternal discomfort.  Has improved.     Objective denies chest pain shortness of breath leg pain.  See HPI 10 review of systems otherwise negative    Physical Exam GENERAL  MENTAL STATUS - Alert. GENERAL APPEARANCE - Cooperative and well groomed. ORIENTATION - Oriented X4. BUILD & NUTRITION - Well nourished and well developed. HYDRATION - Well hydrated.    INTEGUMENTARY  GENERAL CHARACTERISTICS - Overall examination of the patient's skin reveals no rashes. COLOR - not icteric. SKIN MOISTURE - normal skin moisture. TEMPERATURE - normal worth is noted.    HEAD AND NECK  HEAD  HEAD SHAPE - Atraumatic and normocephalic.  TRACHEA - midline.  THYROID  GLAND CHARACTERISTICS - normal size and consistency and no palpable nodules.    EYE  SCLERA/CONJUNCTIVA - BILATERAL - Anicteric.    CHEST AND LUNG EXAM ecchymosis noted over sternum.  Tenderness left chest wall  AUSCULTATION -  BREATH SOUNDS - Clear.    BREAST - Did not examine.    CARDIOVASCULAR  AUSCULTATION: RHYTHM - Regular. HEART SOUNDS - Normal heart sounds.  MURMURS & OTHER HEART SOUNDS - Auscultation of the heart reveals regular rate and no murmurs.    ABDOMEN  PALPATION/PERCUSSION - Palpation and percussion of the abdomen reveal soft, non tender, no mass and no hepatosplenomegaly.    LYMPHATIC  GENERAL LYMPHATICS  BREAST LYMPHATIC EXAM - No cervical adenopathy, supraclavicular adenopathy or axilliary adenopathy.       Last Recorded Vitals  Blood pressure 140/71, pulse 72, temperature 36.4 °C (97.5 °F), resp. rate 20, height 1.524 m (5'), weight 52.2 kg (115 lb), SpO2 91%.  Intake/Output last 3 Shifts:  I/O last 3 completed shifts:  In: 500 (9.6 mL/kg) [P.O.:500]  Out: - (0 mL/kg)   Weight: 52.2 kg     Relevant Results                               Assessment/Plan   Assessment & Plan  Closed fracture of body of sternum with routine healing    Clinically without evidence of cardiac contusion.  Echo pending.  Awaiting medical evaluation for completion and discharge.  PT OT eval has not been completed to this point.  Anticipate discharge to home with home health in a.m.       I spent 20 minutes in the professional and overall care of this patient.      Melvin Fisher MD

## 2024-11-10 NOTE — PROGRESS NOTES
Occupational Therapy    Evaluation    Patient Name: Tuyet Ramirez  MRN: [id number]  Department: [facility]  Room: [address] [place] [address]  Today's Date: 11/10/2024  Time Calculation  Start Time: 1120  Stop Time: 1130  Time Calculation (min): 10 min        Assessment:  Prognosis: Fair  End of Session Communication: Bedside nurse  End of Session Patient Position: Bed, 2 rail up, Alarm off, not on at start of session  OT Assessment Results: Decreased safe judgment during ADL, Decreased endurance, Decreased functional mobility  Prognosis: Fair  Strengths: Housing layout, Living arrangement secure  Barriers to Participation: Ability to acquire knowledge, Comorbidities, Premorbid level of function  Plan:  Treatment Interventions: ADL retraining, Functional transfer training, UE strengthening/ROM, Patient/family training, Equipment evaluation/education, Endurance training  OT Frequency: Other (Comment) (1-2 follow ups)  OT Discharge Recommendations: Low intensity level of continued care  OT - OK to Discharge: Yes (from acute OT levels until next level of care when medically cleared)  Treatment Interventions: ADL retraining, Functional transfer training, UE strengthening/ROM, Patient/family training, Equipment evaluation/education, Endurance training    Subjective   Current Problem:  1. Closed fracture of sternum, unspecified portion of sternum, initial encounter  CANCELED: Transthoracic Echo (TTE) Complete    CANCELED: Transthoracic Echo (TTE) Complete      2. Traumatic closed nondisplaced fracture of multiple ribs of left side, initial encounter        3. Abnormal electrocardiogram (ECG) (EKG)  CANCELED: Transthoracic Echo (TTE) Complete    CANCELED: Transthoracic Echo (TTE) Complete        General:  General  Reason for Referral: patient to ED with chest pain x5 days s/p fall and with (L) rib fx 6-8, (R) rib fx 6, fx of sternal body  Referred By: Kina Bruce, APRN-CNP  Past Medical History Relevant to Rehab: anxiety, depression, COPD,  HTN  Co-Treatment: PT  Co-Treatment Reason: to facilitate safety and activity tolerance  Prior to Session Communication: Bedside nurse  Patient Position Received: Bed, 2 rail up  General Comment: CT (-) for PE. Imaging revealed acute L rib fx's 6-8, R rib fx 6, sternal body fx.  Precautions:  Precautions Comment: Fall Precautions, Sternal Precautions for pain management            Pain:  Pain Assessment  Pain Assessment: 0-10  0-10 (Numeric) Pain Score: 7 (sternal pain)    Objective   Cognition:  Overall Cognitive Status: Within Functional Limits           Home Living:  Home Living Comments: patient reporting she lives with her elderly mother and stepfather. Single story home. 3STE. independent with ADLs, IADLs, (+) driving; does own walking sticks but does not use them regularly; reporting 10 falls in the past 6 months 2/2 LOB; patient with WIS with shower chair    ADL:  ADL Comments: anticipate SBA/MOD I with all ADLs however concern for safety 2/2 fall risk  Activity Tolerance:  Endurance: Endurance does not limit participation in activity  Bed Mobility/Transfers: Bed Mobility  Bed Mobility: Yes  Bed Mobility 1  Bed Mobility Comments 1: Supine <> Sit (completing with supervision for safety)    Transfers  Transfer: Yes (functional transfers at supervision level with FWW, patient does guard her mobility 2/2 pain however overall tolerates well)      Sensation:  Sensation Comment: declines numbness/tingling  Strength:  Strength Comments: (B) UE grossly 4/5 observed through functinal activity; not formally assessed 2/2 sternal fx    Extremities: RUE   RUE : Within Functional Limits and LUE   LUE: Within Functional Limits      Outcome Measures:[facility] Daily Activity  Putting on and taking off regular lower body clothing: A little  Bathing (including washing, rinsing, drying): A little  Putting on and taking off regular upper body clothing: None  Toileting, which includes using toilet, bedpan or urinal: A little  Taking  care of personal grooming such as brushing teeth: None  Eating Meals: None  Daily Activity - Total Score: 21        Education Documentation  Body Mechanics, taught by Key Garza OT at 11/10/2024  2:33 PM.  Learner: Patient  Readiness: Acceptance  Method: Explanation  Response: Verbalizes Understanding    Precautions, taught by Key Garza OT at 11/10/2024  2:33 PM.  Learner: Patient  Readiness: Acceptance  Method: Explanation  Response: Verbalizes Understanding    ADL Training, taught by Key Garza OT at 11/10/2024  2:33 PM.  Learner: Patient  Readiness: Acceptance  Method: Explanation  Response: Verbalizes Understanding    Education Comments  No comments found.        OP EDUCATION:  Education  Individual(s) Educated: Patient  Education Provided: Fall precautons, Ergonomics and postural realignment  Education Comment: will require continued education    Goals:  Encounter Problems       Encounter Problems (Active)       OT Goals       Patient will complete functional transfers at Independent level with LRD to facilitate increased independence and safety with home going  (Progressing)       Start:  11/10/24    Expected End:  11/24/24            Patient will complete functional mobility at Independent level with LRD to facilitate increased independence and safety with home going  (Progressing)       Start:  11/10/24    Expected End:  11/24/24            Patient will complete LB dressing at MOD I level to facilitate safety and independence for home going   (Progressing)       Start:  11/10/24    Expected End:  11/24/24

## 2024-11-11 ENCOUNTER — APPOINTMENT (OUTPATIENT)
Dept: CARDIOLOGY | Facility: HOSPITAL | Age: 69
End: 2024-11-11
Payer: MEDICARE

## 2024-11-11 LAB
ANION GAP SERPL CALC-SCNC: 11 MMOL/L (ref 10–20)
BUN SERPL-MCNC: 11 MG/DL (ref 6–23)
CALCIUM SERPL-MCNC: 9.2 MG/DL (ref 8.6–10.3)
CHLORIDE SERPL-SCNC: 96 MMOL/L (ref 98–107)
CO2 SERPL-SCNC: 27 MMOL/L (ref 21–32)
CREAT SERPL-MCNC: 0.59 MG/DL (ref 0.5–1.05)
EGFRCR SERPLBLD CKD-EPI 2021: >90 ML/MIN/1.73M*2
GLUCOSE SERPL-MCNC: 102 MG/DL (ref 74–99)
POTASSIUM SERPL-SCNC: 3.8 MMOL/L (ref 3.5–5.3)
SODIUM SERPL-SCNC: 130 MMOL/L (ref 136–145)

## 2024-11-11 PROCEDURE — 2500000004 HC RX 250 GENERAL PHARMACY W/ HCPCS (ALT 636 FOR OP/ED): Performed by: REGISTERED NURSE

## 2024-11-11 PROCEDURE — 2500000001 HC RX 250 WO HCPCS SELF ADMINISTERED DRUGS (ALT 637 FOR MEDICARE OP): Performed by: INTERNAL MEDICINE

## 2024-11-11 PROCEDURE — 2500000001 HC RX 250 WO HCPCS SELF ADMINISTERED DRUGS (ALT 637 FOR MEDICARE OP)

## 2024-11-11 PROCEDURE — 2500000001 HC RX 250 WO HCPCS SELF ADMINISTERED DRUGS (ALT 637 FOR MEDICARE OP): Performed by: REGISTERED NURSE

## 2024-11-11 PROCEDURE — 99232 SBSQ HOSP IP/OBS MODERATE 35: CPT | Performed by: SURGERY

## 2024-11-11 PROCEDURE — 80048 BASIC METABOLIC PNL TOTAL CA: CPT

## 2024-11-11 PROCEDURE — 93306 TTE W/DOPPLER COMPLETE: CPT

## 2024-11-11 PROCEDURE — 36415 COLL VENOUS BLD VENIPUNCTURE: CPT

## 2024-11-11 PROCEDURE — 1200000002 HC GENERAL ROOM WITH TELEMETRY DAILY

## 2024-11-11 PROCEDURE — 99232 SBSQ HOSP IP/OBS MODERATE 35: CPT

## 2024-11-11 PROCEDURE — 2500000002 HC RX 250 W HCPCS SELF ADMINISTERED DRUGS (ALT 637 FOR MEDICARE OP, ALT 636 FOR OP/ED): Performed by: REGISTERED NURSE

## 2024-11-11 PROCEDURE — 2500000005 HC RX 250 GENERAL PHARMACY W/O HCPCS: Performed by: REGISTERED NURSE

## 2024-11-11 PROCEDURE — 99232 SBSQ HOSP IP/OBS MODERATE 35: CPT | Performed by: INTERNAL MEDICINE

## 2024-11-11 RX ORDER — LISINOPRIL 5 MG/1
2.5 TABLET ORAL DAILY
Status: DISCONTINUED | OUTPATIENT
Start: 2024-11-11 | End: 2024-11-12 | Stop reason: HOSPADM

## 2024-11-11 RX ORDER — LIDOCAINE 560 MG/1
1 PATCH PERCUTANEOUS; TOPICAL; TRANSDERMAL DAILY
Qty: 7 PATCH | Refills: 0 | Status: SHIPPED | OUTPATIENT
Start: 2024-11-12 | End: 2024-11-12

## 2024-11-11 RX ORDER — WATER
50 LIQUID (ML) MISCELLANEOUS
Status: DISCONTINUED | OUTPATIENT
Start: 2024-11-11 | End: 2024-11-12 | Stop reason: HOSPADM

## 2024-11-11 RX ORDER — LANOLIN ALCOHOL/MO/W.PET/CERES
400 CREAM (GRAM) TOPICAL DAILY
Qty: 7 TABLET | Refills: 0 | Status: SHIPPED | OUTPATIENT
Start: 2024-11-12 | End: 2024-11-12

## 2024-11-11 RX ORDER — GABAPENTIN 300 MG/1
300 CAPSULE ORAL 3 TIMES DAILY
Status: DISCONTINUED | OUTPATIENT
Start: 2024-11-11 | End: 2024-11-12 | Stop reason: HOSPADM

## 2024-11-11 RX ORDER — OXYCODONE HYDROCHLORIDE 5 MG/1
5 TABLET ORAL EVERY 4 HOURS PRN
Status: DISCONTINUED | OUTPATIENT
Start: 2024-11-11 | End: 2024-11-12 | Stop reason: HOSPADM

## 2024-11-11 RX ORDER — GABAPENTIN 300 MG/1
300 CAPSULE ORAL 3 TIMES DAILY
Qty: 15 CAPSULE | Refills: 0 | Status: SHIPPED | OUTPATIENT
Start: 2024-11-11 | End: 2024-11-12

## 2024-11-11 RX ORDER — OXYCODONE HYDROCHLORIDE 5 MG/1
5 TABLET ORAL EVERY 4 HOURS PRN
Qty: 15 TABLET | Refills: 0 | Status: SHIPPED | OUTPATIENT
Start: 2024-11-11 | End: 2024-11-12

## 2024-11-11 RX ADMIN — OXYCODONE HYDROCHLORIDE 5 MG: 5 TABLET ORAL at 06:31

## 2024-11-11 RX ADMIN — Medication 50 ML: at 13:35

## 2024-11-11 RX ADMIN — LIDOCAINE 1 PATCH: 4 PATCH TOPICAL at 08:51

## 2024-11-11 RX ADMIN — OXYCODONE HYDROCHLORIDE 5 MG: 5 TABLET ORAL at 12:31

## 2024-11-11 RX ADMIN — ACETAMINOPHEN 975 MG: 325 TABLET, FILM COATED ORAL at 00:03

## 2024-11-11 RX ADMIN — Medication 50 ML: at 16:43

## 2024-11-11 RX ADMIN — Medication 50 ML: at 22:00

## 2024-11-11 RX ADMIN — Medication 50 ML: at 08:55

## 2024-11-11 RX ADMIN — CARVEDILOL 12.5 MG: 12.5 TABLET, FILM COATED ORAL at 08:48

## 2024-11-11 RX ADMIN — Medication 50 ML: at 22:28

## 2024-11-11 RX ADMIN — ROSUVASTATIN CALCIUM 10 MG: 10 TABLET, FILM COATED ORAL at 08:50

## 2024-11-11 RX ADMIN — ACETAMINOPHEN 975 MG: 325 TABLET, FILM COATED ORAL at 17:33

## 2024-11-11 RX ADMIN — ACETAMINOPHEN 975 MG: 325 TABLET, FILM COATED ORAL at 12:31

## 2024-11-11 RX ADMIN — PANTOPRAZOLE SODIUM 40 MG: 40 TABLET, DELAYED RELEASE ORAL at 06:28

## 2024-11-11 RX ADMIN — ENOXAPARIN SODIUM 30 MG: 30 INJECTION SUBCUTANEOUS at 08:51

## 2024-11-11 RX ADMIN — POLYETHYLENE GLYCOL 3350 17 G: 17 POWDER, FOR SOLUTION ORAL at 08:50

## 2024-11-11 RX ADMIN — SERTRALINE HYDROCHLORIDE 50 MG: 50 TABLET ORAL at 08:49

## 2024-11-11 RX ADMIN — Medication 250 ML: at 06:29

## 2024-11-11 RX ADMIN — PANTOPRAZOLE SODIUM 40 MG: 40 TABLET, DELAYED RELEASE ORAL at 16:42

## 2024-11-11 RX ADMIN — Medication 50 ML: at 11:19

## 2024-11-11 RX ADMIN — Medication 50 ML: at 11:11

## 2024-11-11 RX ADMIN — Medication 50 ML: at 16:01

## 2024-11-11 RX ADMIN — Medication 50 ML: at 20:55

## 2024-11-11 RX ADMIN — OXYCODONE HYDROCHLORIDE 5 MG: 5 TABLET ORAL at 16:42

## 2024-11-11 RX ADMIN — VITAM B12 500 MCG: 100 TAB at 08:50

## 2024-11-11 RX ADMIN — GABAPENTIN 300 MG: 300 CAPSULE ORAL at 08:49

## 2024-11-11 RX ADMIN — Medication 50 ML: at 12:31

## 2024-11-11 RX ADMIN — OXYCODONE HYDROCHLORIDE 5 MG: 5 TABLET ORAL at 00:04

## 2024-11-11 RX ADMIN — GABAPENTIN 300 MG: 300 CAPSULE ORAL at 21:51

## 2024-11-11 RX ADMIN — GABAPENTIN 300 MG: 300 CAPSULE ORAL at 14:58

## 2024-11-11 RX ADMIN — MAGNESIUM OXIDE TAB 400 MG (241.3 MG ELEMENTAL MG) 400 MG: 400 (241.3 MG) TAB at 08:49

## 2024-11-11 RX ADMIN — Medication 50 ML: at 17:33

## 2024-11-11 RX ADMIN — OXYCODONE HYDROCHLORIDE 5 MG: 5 TABLET ORAL at 21:51

## 2024-11-11 RX ADMIN — Medication 50 ML: at 14:18

## 2024-11-11 RX ADMIN — LISINOPRIL 2.5 MG: 5 TABLET ORAL at 08:48

## 2024-11-11 RX ADMIN — ENOXAPARIN SODIUM 30 MG: 30 INJECTION SUBCUTANEOUS at 21:50

## 2024-11-11 RX ADMIN — Medication 50 ML: at 14:58

## 2024-11-11 RX ADMIN — ACETAMINOPHEN 975 MG: 325 TABLET, FILM COATED ORAL at 06:28

## 2024-11-11 ASSESSMENT — PAIN SCALES - GENERAL
PAINLEVEL_OUTOF10: 9
PAINLEVEL_OUTOF10: 0 - NO PAIN
PAINLEVEL_OUTOF10: 9
PAINLEVEL_OUTOF10: 6
PAINLEVEL_OUTOF10: 4
PAINLEVEL_OUTOF10: 9
PAINLEVEL_OUTOF10: 10 - WORST POSSIBLE PAIN

## 2024-11-11 ASSESSMENT — PAIN - FUNCTIONAL ASSESSMENT
PAIN_FUNCTIONAL_ASSESSMENT: 0-10

## 2024-11-11 ASSESSMENT — PAIN SCALES - WONG BAKER: WONGBAKER_NUMERICALRESPONSE: HURTS LITTLE MORE

## 2024-11-11 ASSESSMENT — ACTIVITIES OF DAILY LIVING (ADL): LACK_OF_TRANSPORTATION: NO

## 2024-11-11 ASSESSMENT — PAIN DESCRIPTION - LOCATION: LOCATION: STERNUM

## 2024-11-11 NOTE — PROGRESS NOTES
11/11/24 1108   Discharge Planning   Living Arrangements Parent   Support Systems Parent   Assistance Needed none   Type of Residence Private residence   Home or Post Acute Services In home services   Type of Home Care Services Home OT;Home PT   Expected Discharge Disposition Home H   Does the patient need discharge transport arranged? No   Financial Resource Strain   How hard is it for you to pay for the very basics like food, housing, medical care, and heating? Not very   Housing Stability   In the last 12 months, was there a time when you were not able to pay the mortgage or rent on time? N   At any time in the past 12 months, were you homeless or living in a shelter (including now)? N   Transportation Needs   In the past 12 months, has lack of transportation kept you from medical appointments or from getting medications? no   In the past 12 months, has lack of transportation kept you from meetings, work, or from getting things needed for daily living? No     Met with the patient in the room, she states that she moved in with her mother, they help each other as needed. She is normally independent in her ambulation, sometimes uses a walking stick. Discussed option of [facility], she is agreeable to [facility]. Offered her a list of agencies, she declined with a preference for [facility]. She plans to return home on discharge, follows with her [facility] care MD, manages her own medication.

## 2024-11-11 NOTE — PROGRESS NOTES
Tuyet Ramirez is a 69 y.o. female on day 2 of admission presenting with Traumatic closed nondisplaced fracture of multiple ribs of left side, initial encounter.    Subjective   Patient endorses ongoing chest wall pain, but states it improves with medication. She is scheduled for an ECHO today d/t ongoing T-wave inversions on tele.       Objective     Physical Exam  Constitutional:       General: She is not in acute distress.     Appearance: She is not ill-appearing or toxic-appearing.      Comments: Chronically ill appearing   HENT:      Mouth/Throat:      Mouth: Mucous membranes are moist.   Cardiovascular:      Rate and Rhythm: Normal rate and regular rhythm.      Heart sounds: Normal heart sounds.   Pulmonary:      Effort: Pulmonary effort is normal. No respiratory distress.      Breath sounds: Normal breath sounds.   Chest:      Comments: B/l upper chest wall and sternum TTP, fading ecchymosis to right upper chest wall.  Musculoskeletal:         General: Normal range of motion.   Skin:     General: Skin is warm and dry.   Neurological:      General: No focal deficit present.      Mental Status: She is alert and oriented to person, place, and time.   Psychiatric:         Mood and Affect: Mood normal.         Behavior: Behavior normal.         Last Recorded Vitals  Blood pressure 102/55, pulse 76, temperature 36.3 °C (97.3 °F), resp. rate 20, height 1.524 m (5'), weight 52.2 kg (115 lb), SpO2 93%.  Intake/Output last 3 Shifts:  I/O last 3 completed shifts:  In: 1235 (23.7 mL/kg) [P.O.:950; I.V.:285 (5.5 mL/kg)]  Out: - (0 mL/kg)   Weight: 52.2 kg       Assessment/Plan   69 year old female with a past medical history significant for left sided colon adenocarcinoma s/p laparoscopic colectomy (2021), COPD, anxiety/depression, B12 and folate deficiencies, former ETOH and tobacco abuse, and constipation presented to [facility] ED on 11/9 from home with chest pain. Diagnosed with acute sternal and left/rib rib fractures.  Admitted to trauma.     #acute left 6-8 and acute right 6th rib fracture   #acute sternal fracture  - okay for diet  - Echocardiogram  - Continue telemetry   - multimodal pain control      #hyponatremia - likely 2/2 poor PO intake  - oral hydration solution  - appreciate medicine recs for any additional workup     #PMHx: HTN, anxiety/depression  - appreciate medicine recs      PT/OT     Dispo: Likely discharge tomorrow.      Ppx:   Lovenox     The patient was seen and discussed with the attending surgeon Dr. Fisher.      Adelina Blake PA-C

## 2024-11-11 NOTE — HOSPITAL COURSE
Tuyet Ramirez is a 69 y.o. female presenting with chest pain. Workup/imaging in ED identified acute left sided rib fractures 6-8, non displaced) and acute right 6th rib fracture along with fracture through sternal body. CT PE was negative. Admitted to trauma service with consult to medicine.    Patient had some T-wave inversions on EKG and mildly elevated troponin, so ECHO was ordered, was revealed ***. Patient had ongoing pain throughout admission, but it responded well to multimodal analgesia. She was evaluated by PT/OT and recommended HHC. She will be discharged to home with HHC and Rx for gabapentin, lidocaine patch, mag-ox, and oxycodone. She should follow-up with her PCP outpatient.

## 2024-11-11 NOTE — PROGRESS NOTES
Tuyet Ramirez is a 69 y.o. female on day 2 of admission presenting with Traumatic closed nondisplaced fracture of multiple ribs of left side, initial encounter.    Subjective   Patient still complaining of chest wall pain.  Shortness of breath.       Objective systems negative    Physical Exam chronically ill GENERAL  MENTAL STATUS - Alert. GENERAL APPEARANCE - Cooperative and well groomed. ORIENTATION - Oriented X4. BUILD & NUTRITION - Well nourished and well developed. HYDRATION - Well hydrated.    INTEGUMENTARY  GENERAL CHARACTERISTICS - Overall examination of the patient's skin reveals no rashes. COLOR - not icteric. SKIN MOISTURE - normal skin moisture. TEMPERATURE - normal worth is noted.    HEAD AND NECK  HEAD  HEAD SHAPE - Atraumatic and normocephalic.  TRACHEA - midline.  THYROID  GLAND CHARACTERISTICS - normal size and consistency and no palpable nodules.    EYE  SCLERA/CONJUNCTIVA - BILATERAL - Anicteric.  Cystadenocarcinoma  CHEST AND LUNG EXAM  AUSCULTATION -  BREATH SOUNDS - Clear.    BREAST - Did not examine.    CARDIOVASCULAR  AUSCULTATION: RHYTHM - Regular. HEART SOUNDS - Normal heart sounds.  MURMURS & OTHER HEART SOUNDS - Auscultation of the heart reveals regular rate and no murmurs.    ABDOMEN  PALPATION/PERCUSSION - Palpation and percussion of the abdomen reveal soft, non tender, no mass and no hepatosplenomegaly.    LYMPHATIC  GENERAL LYMPHATICS  BREAST LYMPHATIC EXAM - No cervical adenopathy, supraclavicular adenopathy or axilliary adenopathy.       Last Recorded Vitals  Blood pressure 102/55, pulse 76, temperature 36.3 °C (97.3 °F), resp. rate 20, height 1.524 m (5'), weight 52.2 kg (115 lb), SpO2 93%.  Intake/Output last 3 Shifts:  I/O last 3 completed shifts:  In: 1235 (23.7 mL/kg) [P.O.:950; I.V.:285 (5.5 mL/kg)]  Out: - (0 mL/kg)   Weight: 52.2 kg     Relevant Results                              Assessment/Plan   Assessment & Plan  Closed fracture of body of sternum with routine  healing    Rhythm review raises question of new onset T wave inversion possible prolonged QT interval which is potentially new.  Awaiting echo.  Given these findings I have ordered a cardiac consultation.       I spent 20 minutes in the professional and overall care of this patient.      Melvin Fisher MD

## 2024-11-11 NOTE — PROGRESS NOTES
Subjective     Patient seen and examined. No acute overnight events.  Endorses improvement in her chest and rib pain.  Instructed to decrease her free water intake slightly when she goes home into try and keep a normal appetite.      Assessment / Plan   Tuyet Ramirez is a 69 y.o. female with a history of colon cancer s/p bowel resection, COPD, hypertension presented with chest pain after several recent falls.  Medicine was consulted for management of hyponatremia.     #Hyponatremia, improving   -Patient is chronically mildly hyponatremic with serum sodium 129-132  -S/p 500 cc normal saline with improvement of sodium to 129  -Discontinue maintenance fluids, continue to monitor     #Hypokalemia, improved  -Potassium 3.2 on admission; replete and monitor chemistry     #Acute L 6-8 and R 6th rib fractures  -Management per trauma team    #Incidental CT findings  -Spiculated density in BRIGHT; continue to monitor outpatient     #HTN  #Hyperlipidemia  #Tobacco use disorder  -Continue home carvedilol, Crestor  -Smokes ~15 cigarettes/day; pt denies need for nicotine patch      Objective   Physical Exam  Vitals reviewed.   Constitutional:       Appearance: Normal appearance.   Cardiovascular:      Rate and Rhythm: Normal rate and regular rhythm.   Pulmonary:      Effort: Pulmonary effort is normal.      Breath sounds: Normal breath sounds.   Abdominal:      General: Abdomen is flat.      Palpations: Abdomen is soft.   Musculoskeletal:      Right lower leg: No edema.      Left lower leg: No edema.   Skin:     General: Skin is warm and dry.   Neurological:      General: No focal deficit present.      Mental Status: She is alert and oriented to person, place, and time.       Last Recorded Vitals  Blood pressure (!) 168/95, pulse 82, temperature 36.4 °C (97.5 °F), resp. rate 20, height 1.524 m (5'), weight 52.2 kg (115 lb), SpO2 91%.  Intake/Output last 3 Shifts:  I/O last 3 completed shifts:  In: 1235 (23.7 mL/kg) [P.O.:950;  I.V.:285 (5.5 mL/kg)]  Out: - (0 mL/kg)   Weight: 52.2 kg     Last CBC & BMP  Lab Results   Component Value Date    GLUCOSE 97 11/10/2024    CALCIUM 9.3 11/10/2024     (L) 11/10/2024    K 4.7 11/10/2024    CO2 30 11/10/2024    CL 93 (L) 11/10/2024    BUN 12 11/10/2024    CREATININE 0.71 11/10/2024     Lab Results   Component Value Date    WBC 5.7 11/10/2024    HGB 13.4 11/10/2024    HCT 39.2 11/10/2024    MCV 93 11/10/2024     (L) 11/10/2024

## 2024-11-12 VITALS
DIASTOLIC BLOOD PRESSURE: 59 MMHG | HEIGHT: 60 IN | HEART RATE: 70 BPM | BODY MASS INDEX: 22.58 KG/M2 | TEMPERATURE: 97.3 F | SYSTOLIC BLOOD PRESSURE: 110 MMHG | RESPIRATION RATE: 20 BRPM | OXYGEN SATURATION: 93 % | WEIGHT: 115 LBS

## 2024-11-12 LAB
ANION GAP SERPL CALC-SCNC: 10 MMOL/L (ref 10–20)
AORTIC VALVE MEAN GRADIENT: 3 MMHG
AORTIC VALVE PEAK VELOCITY: 1.11 M/S
AV PEAK GRADIENT: 5 MMHG
AVA (PEAK VEL): 2.07 CM2
AVA (VTI): 1.85 CM2
BUN SERPL-MCNC: 10 MG/DL (ref 6–23)
CALCIUM SERPL-MCNC: 9.5 MG/DL (ref 8.6–10.3)
CHLORIDE SERPL-SCNC: 96 MMOL/L (ref 98–107)
CO2 SERPL-SCNC: 30 MMOL/L (ref 21–32)
CREAT SERPL-MCNC: 0.7 MG/DL (ref 0.5–1.05)
EGFRCR SERPLBLD CKD-EPI 2021: >90 ML/MIN/1.73M*2
EJECTION FRACTION APICAL 4 CHAMBER: 49.6
EJECTION FRACTION: 58 %
GLUCOSE SERPL-MCNC: 98 MG/DL (ref 74–99)
HOLD SPECIMEN: NORMAL
LEFT VENTRICLE INTERNAL DIMENSION DIASTOLE: 4.27 CM (ref 3.5–6)
LEFT VENTRICULAR OUTFLOW TRACT DIAMETER: 1.7 CM
MITRAL VALVE E/A RATIO: 1.21
POTASSIUM SERPL-SCNC: 4.2 MMOL/L (ref 3.5–5.3)
RIGHT VENTRICLE FREE WALL PEAK S': 13.5 CM/S
SODIUM SERPL-SCNC: 132 MMOL/L (ref 136–145)
TRICUSPID ANNULAR PLANE SYSTOLIC EXCURSION: 2.3 CM

## 2024-11-12 PROCEDURE — 99239 HOSP IP/OBS DSCHRG MGMT >30: CPT

## 2024-11-12 PROCEDURE — 99232 SBSQ HOSP IP/OBS MODERATE 35: CPT

## 2024-11-12 PROCEDURE — 2500000001 HC RX 250 WO HCPCS SELF ADMINISTERED DRUGS (ALT 637 FOR MEDICARE OP)

## 2024-11-12 PROCEDURE — 2500000004 HC RX 250 GENERAL PHARMACY W/ HCPCS (ALT 636 FOR OP/ED): Performed by: REGISTERED NURSE

## 2024-11-12 PROCEDURE — 2500000002 HC RX 250 W HCPCS SELF ADMINISTERED DRUGS (ALT 637 FOR MEDICARE OP, ALT 636 FOR OP/ED): Performed by: REGISTERED NURSE

## 2024-11-12 PROCEDURE — 2500000005 HC RX 250 GENERAL PHARMACY W/O HCPCS: Performed by: REGISTERED NURSE

## 2024-11-12 PROCEDURE — 2500000001 HC RX 250 WO HCPCS SELF ADMINISTERED DRUGS (ALT 637 FOR MEDICARE OP): Performed by: REGISTERED NURSE

## 2024-11-12 PROCEDURE — 36415 COLL VENOUS BLD VENIPUNCTURE: CPT

## 2024-11-12 PROCEDURE — 2500000001 HC RX 250 WO HCPCS SELF ADMINISTERED DRUGS (ALT 637 FOR MEDICARE OP): Performed by: INTERNAL MEDICINE

## 2024-11-12 PROCEDURE — 80048 BASIC METABOLIC PNL TOTAL CA: CPT

## 2024-11-12 RX ORDER — LANOLIN ALCOHOL/MO/W.PET/CERES
400 CREAM (GRAM) TOPICAL DAILY
Qty: 14 TABLET | Refills: 0 | Status: SHIPPED | OUTPATIENT
Start: 2024-11-12 | End: 2024-11-26

## 2024-11-12 RX ORDER — OXYCODONE HYDROCHLORIDE 5 MG/1
5 TABLET ORAL EVERY 4 HOURS PRN
Qty: 15 TABLET | Refills: 0 | Status: SHIPPED | OUTPATIENT
Start: 2024-11-12 | End: 2024-11-15

## 2024-11-12 RX ORDER — LIDOCAINE 560 MG/1
1 PATCH PERCUTANEOUS; TOPICAL; TRANSDERMAL DAILY
Qty: 7 PATCH | Refills: 0 | Status: SHIPPED | OUTPATIENT
Start: 2024-11-12

## 2024-11-12 RX ORDER — GABAPENTIN 300 MG/1
300 CAPSULE ORAL 3 TIMES DAILY
Qty: 15 CAPSULE | Refills: 0 | Status: SHIPPED | OUTPATIENT
Start: 2024-11-12 | End: 2024-11-19

## 2024-11-12 RX ADMIN — VITAM B12 500 MCG: 100 TAB at 09:08

## 2024-11-12 RX ADMIN — Medication 50 ML: at 10:24

## 2024-11-12 RX ADMIN — OXYCODONE HYDROCHLORIDE 5 MG: 5 TABLET ORAL at 10:59

## 2024-11-12 RX ADMIN — Medication 50 ML: at 06:11

## 2024-11-12 RX ADMIN — PANTOPRAZOLE SODIUM 40 MG: 40 TABLET, DELAYED RELEASE ORAL at 07:04

## 2024-11-12 RX ADMIN — ACETAMINOPHEN 975 MG: 325 TABLET, FILM COATED ORAL at 09:12

## 2024-11-12 RX ADMIN — Medication 50 ML: at 08:00

## 2024-11-12 RX ADMIN — CARVEDILOL 12.5 MG: 12.5 TABLET, FILM COATED ORAL at 09:08

## 2024-11-12 RX ADMIN — GABAPENTIN 300 MG: 300 CAPSULE ORAL at 09:08

## 2024-11-12 RX ADMIN — ACETAMINOPHEN 975 MG: 325 TABLET, FILM COATED ORAL at 00:56

## 2024-11-12 RX ADMIN — OXYCODONE HYDROCHLORIDE 5 MG: 5 TABLET ORAL at 02:22

## 2024-11-12 RX ADMIN — Medication 50 ML: at 07:51

## 2024-11-12 RX ADMIN — OXYCODONE HYDROCHLORIDE 5 MG: 5 TABLET ORAL at 07:00

## 2024-11-12 RX ADMIN — Medication 50 ML: at 11:00

## 2024-11-12 RX ADMIN — Medication 50 ML: at 09:24

## 2024-11-12 RX ADMIN — ENOXAPARIN SODIUM 30 MG: 30 INJECTION SUBCUTANEOUS at 09:07

## 2024-11-12 RX ADMIN — MAGNESIUM OXIDE TAB 400 MG (241.3 MG ELEMENTAL MG) 400 MG: 400 (241.3 MG) TAB at 09:08

## 2024-11-12 RX ADMIN — SERTRALINE HYDROCHLORIDE 50 MG: 50 TABLET ORAL at 09:08

## 2024-11-12 RX ADMIN — LISINOPRIL 2.5 MG: 5 TABLET ORAL at 09:09

## 2024-11-12 RX ADMIN — ROSUVASTATIN CALCIUM 10 MG: 10 TABLET, FILM COATED ORAL at 09:08

## 2024-11-12 RX ADMIN — LIDOCAINE 1 PATCH: 4 PATCH TOPICAL at 09:08

## 2024-11-12 ASSESSMENT — COGNITIVE AND FUNCTIONAL STATUS - GENERAL
DAILY ACTIVITIY SCORE: 24
MOBILITY SCORE: 24

## 2024-11-12 ASSESSMENT — PAIN - FUNCTIONAL ASSESSMENT
PAIN_FUNCTIONAL_ASSESSMENT: 0-10

## 2024-11-12 ASSESSMENT — PAIN SCALES - GENERAL
PAINLEVEL_OUTOF10: 5 - MODERATE PAIN
PAINLEVEL_OUTOF10: 9
PAINLEVEL_OUTOF10: 0 - NO PAIN
PAINLEVEL_OUTOF10: 8
PAINLEVEL_OUTOF10: 4
PAINLEVEL_OUTOF10: 8

## 2024-11-12 NOTE — CONSULTS
Cardiology Consults    Impression:  Abnormal ECG  Hypertension  Recent fall with rib fracture  Hyperlipidemia  Tobacco use    Plan:  Abnormal ECG: ECG shows anteroseptal infarct.  This is changed compared to patient's previous EKG in April.  However patient has no anginal complaints no cardiac symptoms her echo cardio shows preserved LV function with no significant valvular heart disease.    At this point no further inpatient cardiac workup is indicated she can follow-up with cardiology clinic in the next 4 to 6 weeks.    Patient is suitable for discharge to cardiac standpoint once medically cleared please call with any further questions or concerns    History Of Present Illness:    Tuyet Ramirez is a 69 y.o. female presenting with with a fall which caused subsequent rib fractures.  ECG was performed due to abnormal T wave on telemetry.  There are no arrhythmias on telemetry patient denies any chest pain palpitations syncope loss of conscious lotion swelling fevers chills nausea vomiting.     She denies prior cardiac history    Last Recorded Vitals:  Vitals:    11/11/24 1323 11/11/24 1648 11/11/24 1934 11/12/24 0002   BP: 102/55 157/75 115/70 110/59   Pulse: 76 68 78 70   Resp:       Temp: 36.3 °C (97.3 °F) 36.1 °C (97 °F) 35.9 °C (96.6 °F) 36.3 °C (97.3 °F)   TempSrc:       SpO2: 93% 96% 93% 93%   Weight:       Height:           Last Labs:  CBC - 11/10/2024:  8:10 AM  5.7 13.4 148    39.2      CMP - 11/12/2024:  7:09 AM  9.5 7.1 21 --- 1.1   3.8 3.9 15 60      PTT - No results in last year.  1.0   11.1 _     Troponin I, High Sensitivity   Date/Time Value Ref Range Status   11/09/2024 10:21 PM 16 (H) 0 - 13 ng/L Final   11/09/2024 05:14 PM 19 (H) 0 - 13 ng/L Final   11/09/2024 12:55 PM 21 (H) 0 - 13 ng/L Final     VLDL   Date/Time Value Ref Range Status   06/18/2021 03:04 PM 26 0 - 40 mg/dL Final   04/25/2019 09:21 AM 30 0 - 40 mg/dL Final      Last I/O:  I/O last 3 completed shifts:  In: 1150 (22 mL/kg)  [P.O.:1150]  Out: - (0 mL/kg)   Weight: 52.2 kg       Ejection Fractions:  EF   Date/Time Value Ref Range Status   11/11/2024 05:45 PM 58 %          Past Medical History:  She has a past medical history of Nausea (01/14/2022).    Past Surgical History:  She has a past surgical history that includes Other surgical history (11/04/2021); Other surgical history (11/04/2021); Other surgical history (01/14/2022); Other surgical history (01/14/2022); and Other surgical history (12/01/2021).      Social History:  She reports that she has been smoking cigarettes. She has been exposed to tobacco smoke. She has never used smokeless tobacco. She reports current alcohol use. She reports current drug use. Frequency: 21.00 times per week. Drug: Marijuana.    Family History:  No family history on file.     Allergies:  Patient has no known allergies.    Inpatient Medications:  Scheduled medications   Medication Dose Route Frequency    acetaminophen  975 mg oral q6h TOBIAS    carvedilol  12.5 mg oral Daily    cyanocobalamin  500 mcg oral Daily    enoxaparin  30 mg subcutaneous q12h    gabapentin  300 mg oral TID    lidocaine  1 patch transdermal Daily    lisinopril  2.5 mg oral Daily    magnesium oxide  400 mg oral Daily    oral hydration  50 mL oral q1h while awake    pantoprazole  40 mg oral BID AC    perflutren lipid microspheres  0.5-10 mL of dilution intravenous Once in imaging    perflutren protein A microsphere  0.5 mL intravenous Once in imaging    polyethylene glycol  17 g oral Daily    rosuvastatin  10 mg oral Daily    sertraline  50 mg oral Daily     PRN medications   Medication    ondansetron    oxyCODONE    oxygen     Continuous Medications   Medication Dose Last Rate     Outpatient Medications:  Current Outpatient Medications   Medication Instructions    carvedilol (Coreg) 12.5 mg tablet Take 1 tablet (12.5 mg) by mouth once daily.    cyanocobalamin (VITAMIN B-12) 500 mcg, Daily    gabapentin (NEURONTIN) 300 mg, oral, 3  times daily    hydrocortisone 1 % ointment Apply 1 Application topically if needed for irritation or rash.    lidocaine 4 % patch 1 patch, transdermal, Daily, Remove & discard patch within 12 hours or as directed by MD.    magnesium oxide (MAG-OX) 400 mg, oral, Daily    multivitamin tablet Take 1 tablet by mouth once daily.    naloxone (NARCAN) 4 mg, nasal, As needed, May repeat every 2-3 minutes if needed, alternating nostrils, until medical assistance becomes available.    omeprazole (PriLOSEC) 40 mg DR capsule TAKE 1 TABLET BY MOUTH 30-60 MINUTES BEFORE BREAKFAST.    oxyCODONE (ROXICODONE) 5 mg, oral, Every 4 hours PRN    rosuvastatin (CRESTOR) 10 mg, Daily    sertraline (ZOLOFT) 50 mg, Daily    triamcinolone (Kenalog) 0.5 % cream 1 Application, As needed       Physical Exam:  Alert awake oriented x 3 no acute distress heart sounds regular lungs are clear abdomen soft           Code Status:  Full Code    I spent 45 minutes in the professional and overall care of this patient.        Bro Benz MD

## 2024-11-12 NOTE — PROGRESS NOTES
Subjective     Patient seen and examined.  Says she feels much better today.  Awaiting echo results for possible discharge today per primary team.      Assessment / Plan   Tuyet Ramirez is a 69 y.o. female with a history of colon cancer s/p bowel resection, COPD, hypertension presented with chest pain after several recent falls.  Medicine was consulted for management of hyponatremia.     #Hyponatremia, improving   -Patient is chronically mildly hyponatremic with serum sodium 129-132  -Monitor BMP, patient appears to be at baseline sodium level     #Acute L 6-8 and R 6th rib fractures  -Management per trauma team    #EKG changes  -EKG 11/9 showing inverted T waves in inferior leads, different from previous EKG 4/30/24, persistent T wave inversions on telemetry  -Echo showing EF 55 to 60%, no wall motion abnormalities mentioned, primary team has consulted cardiology    #Incidental CT findings  -Spiculated density in BRIGHT; continue to monitor outpatient     #HTN  #Hyperlipidemia  #Tobacco use disorder  -Continue home carvedilol, Crestor  -Smokes ~15 cigarettes/day; pt denies need for nicotine patch      Objective   Physical Exam  Vitals reviewed.   Constitutional:       Appearance: Normal appearance.   Cardiovascular:      Rate and Rhythm: Normal rate and regular rhythm.   Pulmonary:      Effort: Pulmonary effort is normal.      Breath sounds: Normal breath sounds.   Abdominal:      General: Abdomen is flat.      Palpations: Abdomen is soft.   Musculoskeletal:      Right lower leg: No edema.      Left lower leg: No edema.   Skin:     General: Skin is warm and dry.   Neurological:      General: No focal deficit present.      Mental Status: She is alert and oriented to person, place, and time.     Last Recorded Vitals  Blood pressure 110/59, pulse 70, temperature 36.3 °C (97.3 °F), resp. rate 20, height 1.524 m (5'), weight 52.2 kg (115 lb), SpO2 93%.  Intake/Output last 3 Shifts:  I/O last 3 completed shifts:  In: 1150  (22 mL/kg) [P.O.:1150]  Out: - (0 mL/kg)   Weight: 52.2 kg     Last CBC & BMP  Lab Results   Component Value Date    GLUCOSE 102 (H) 11/11/2024    CALCIUM 9.2 11/11/2024     (L) 11/11/2024    K 3.8 11/11/2024    CO2 27 11/11/2024    CL 96 (L) 11/11/2024    BUN 11 11/11/2024    CREATININE 0.59 11/11/2024     Lab Results   Component Value Date    WBC 5.7 11/10/2024    HGB 13.4 11/10/2024    HCT 39.2 11/10/2024    MCV 93 11/10/2024     (L) 11/10/2024

## 2024-11-12 NOTE — CARE PLAN
The patient's goals for the shift include      The clinical goals for the shift include Pt. will report a decrease in pain with PRN pain medication      Problem: Pain - Adult  Goal: Verbalizes/displays adequate comfort level or baseline comfort level  Outcome: Progressing     Problem: Discharge Planning  Goal: Discharge to home or other facility with appropriate resources  Outcome: Progressing     Problem: Chronic Conditions and Co-morbidities  Goal: Patient's chronic conditions and co-morbidity symptoms are monitored and maintained or improved  Outcome: Progressing     Problem: Pain  Goal: Takes deep breaths with improved pain control throughout the shift  Outcome: Progressing  Goal: Turns in bed with improved pain control throughout the shift  Outcome: Progressing  Goal: Walks with improved pain control throughout the shift  Outcome: Progressing  Goal: Performs ADL's with improved pain control throughout shift  Outcome: Progressing  Goal: Participates in PT with improved pain control throughout the shift  Outcome: Progressing  Goal: Free from opioid side effects throughout the shift  Outcome: Progressing  Goal: Free from acute confusion related to pain meds throughout the shift  Outcome: Progressing     Problem: Fall/Injury  Goal: Not fall by end of shift  Outcome: Progressing  Goal: Be free from injury by end of the shift  Outcome: Progressing  Goal: Verbalize understanding of personal risk factors for fall in the hospital  Outcome: Progressing  Goal: Verbalize understanding of risk factor reduction measures to prevent injury from fall in the home  Outcome: Progressing  Goal: Use assistive devices by end of the shift  Outcome: Progressing  Goal: Pace activities to prevent fatigue by end of the shift  Outcome: Progressing     Problem: Nutrition  Goal: Less than 5 days NPO/clear liquids  Outcome: Progressing  Goal: Oral intake greater than 50%  Outcome: Progressing  Goal: Oral intake greater 75%  Outcome:  Progressing  Goal: Consume prescribed supplement  Outcome: Progressing  Goal: Adequate PO fluid intake  Outcome: Progressing  Goal: Nutrition support goals are met within 48 hrs  Outcome: Progressing  Goal: Nutrition support is meeting 75% of nutrient needs  Outcome: Progressing  Goal: Tube feed tolerance  Outcome: Progressing  Goal: BG  mg/dL  Outcome: Progressing  Goal: Lab values WNL  Outcome: Progressing  Goal: Electrolytes WNL  Outcome: Progressing  Goal: Promote healing  Outcome: Progressing  Goal: Maintain stable weight  Outcome: Progressing  Goal: Reduce weight from edema/fluid  Outcome: Progressing  Goal: Gradual weight gain  Outcome: Progressing  Goal: Improve ostomy output  Outcome: Progressing

## 2024-11-12 NOTE — DISCHARGE SUMMARY
Discharge Diagnosis  Traumatic closed nondisplaced fracture of multiple ribs of left side, initial encounter    Issues Requiring Follow-Up  Lung nodule    Test Results Pending At Discharge  Pending Labs       No current pending labs.            Hospital Course  Tuyet Ramirez is a 69 y.o. female presenting with chest pain. Workup/imaging in ED identified acute left sided rib fractures 6-8, non displaced) and acute right 6th rib fracture along with fracture through sternal body. CT PE was negative. Admitted to trauma service with consult to medicine.    Patient had some T-wave inversions on EKG and mildly elevated troponin, so ECHO was ordered, and revealed preserved LV function with no significant valvular heart disease. Cardiology was also consulted and did not recommend any further workup. Patient had ongoing pain throughout admission, but it responded well to multimodal analgesia. She was evaluated by PT/OT and recommended HHC. She will be discharged to home with HHC and Rx for gabapentin, lidocaine patch, mag-ox, and oxycodone. She should follow-up with her PCP outpatient. Patient also instructed to call Lung Nodule Clinic at [facility] (number given in discharge paperwork) for further evaluation of nodule found during this admission. Patient stated understanding.     Discharge planning took longer than 30 minutes.       Pertinent Physical Exam At Time of Discharge  Physical Exam  Vitals reviewed.   Constitutional:       General: She is not in acute distress.     Appearance: Normal appearance. She is not ill-appearing.   HENT:      Head: Atraumatic.   Eyes:      Extraocular Movements: Extraocular movements intact.   Cardiovascular:      Rate and Rhythm: Normal rate and regular rhythm.   Pulmonary:      Effort: Pulmonary effort is normal.      Breath sounds: Normal breath sounds.   Chest:      Comments: Ecchymosis present to mid-sternum. Lidocaine patch in place. Patient is TTP in center of sternum and along the  right and left chest wall.   Abdominal:      General: Bowel sounds are normal.      Palpations: Abdomen is soft.      Tenderness: There is no abdominal tenderness.   Musculoskeletal:      Right lower leg: No edema.      Left lower leg: No edema.   Skin:     General: Skin is warm and dry.   Neurological:      Mental Status: She is alert.   Psychiatric:         Behavior: Behavior is cooperative.         Home Medications     Medication List      START taking these medications     gabapentin 300 mg capsule; Commonly known as: Neurontin; Take 1 capsule   (300 mg) by mouth 3 times a day for 5 days.   lidocaine 4 % patch; Place 1 patch over 12 hours on the skin once daily.   Remove & discard patch within 12 hours or as directed by MD.   magnesium oxide 400 mg (241.3 mg magnesium) tablet; Commonly known as:   Mag-Ox; Take 1 tablet (400 mg) by mouth once daily for 14 days.   oxyCODONE 5 mg immediate release tablet; Commonly known as: Roxicodone;   Take 1 tablet (5 mg) by mouth every 4 hours if needed for severe pain (7 -   10) for up to 3 days.     CHANGE how you take these medications     omeprazole 40 mg DR capsule; Commonly known as: PriLOSEC; TAKE 1 TABLET   BY MOUTH 30-60 MINUTES BEFORE BREAKFAST.; What changed: See the new   instructions.     CONTINUE taking these medications     carvedilol 12.5 mg tablet; Commonly known as: Coreg   naloxone 4 mg/0.1 mL nasal spray; Commonly known as: Narcan; Administer   1 spray (4 mg) into affected nostril(s) if needed for opioid reversal or   respiratory depression. May repeat every 2-3 minutes if needed,   alternating nostrils, until medical assistance becomes available.   rosuvastatin 10 mg tablet; Commonly known as: Crestor   sertraline 50 mg tablet; Commonly known as: Zoloft     STOP taking these medications     cyanocobalamin 500 mcg tablet; Commonly known as: Vitamin B-12   hydrocortisone 1 % ointment   multivitamin tablet   triamcinolone 0.5 % cream; Commonly known as:  Kenalog       Outpatient Follow-Up  No future appointments.    Melony Fierro PA-C

## 2024-11-12 NOTE — CARE PLAN
The patient's goals for the shift include      The clinical goals for the shift include maintain safety and pain control      Problem: Pain - Adult  Goal: Verbalizes/displays adequate comfort level or baseline comfort level  Outcome: Progressing  Flowsheets (Taken 11/12/2024 0518)  Verbalizes/displays adequate comfort level or baseline comfort level:   Encourage patient to monitor pain and request assistance   Assess pain using appropriate pain scale   Administer analgesics based on type and severity of pain and evaluate response   Implement non-pharmacological measures as appropriate and evaluate response     Problem: Fall/Injury  Goal: Be free from injury by end of the shift  Outcome: Progressing

## 2024-11-16 LAB
ATRIAL RATE: 89 BPM
P AXIS: 66 DEGREES
PR INTERVAL: 139 MS
Q ONSET: 251 MS
QRS COUNT: 14 BEATS
QRS DURATION: 79 MS
QT INTERVAL: 379 MS
QTC CALCULATION(BAZETT): 462 MS
QTC FREDERICIA: 432 MS
R AXIS: -15 DEGREES
T AXIS: -62 DEGREES
T OFFSET: 440 MS
VENTRICULAR RATE: 89 BPM

## 2024-11-19 ENCOUNTER — TELEMEDICINE (OUTPATIENT)
Dept: PRIMARY CARE | Facility: CLINIC | Age: 69
End: 2024-11-19
Payer: MEDICARE

## 2024-11-19 VITALS
OXYGEN SATURATION: 93 % | HEART RATE: 70 BPM | TEMPERATURE: 42.8 F | HEIGHT: 60 IN | DIASTOLIC BLOOD PRESSURE: 59 MMHG | BODY MASS INDEX: 22.58 KG/M2 | SYSTOLIC BLOOD PRESSURE: 110 MMHG | WEIGHT: 115 LBS | RESPIRATION RATE: 20 BRPM

## 2024-11-19 DIAGNOSIS — F17.200 SMOKER: ICD-10-CM

## 2024-11-19 DIAGNOSIS — R91.1 LUNG NODULE: ICD-10-CM

## 2024-11-19 DIAGNOSIS — D38.1 NEOPLASM OF UNCERTAIN BEHAVIOR OF LUNG: ICD-10-CM

## 2024-11-19 DIAGNOSIS — Z85.038 HISTORY OF COLON CANCER: ICD-10-CM

## 2024-11-19 DIAGNOSIS — R91.1 PULMONARY NODULE 1 CM OR GREATER IN DIAMETER: Primary | ICD-10-CM

## 2024-11-19 PROCEDURE — 1111F DSCHRG MED/CURRENT MED MERGE: CPT | Performed by: NURSE PRACTITIONER

## 2024-11-19 PROCEDURE — 1160F RVW MEDS BY RX/DR IN RCRD: CPT | Performed by: NURSE PRACTITIONER

## 2024-11-19 PROCEDURE — 99202 OFFICE O/P NEW SF 15 MIN: CPT | Performed by: NURSE PRACTITIONER

## 2024-11-19 PROCEDURE — 1159F MED LIST DOCD IN RCRD: CPT | Performed by: NURSE PRACTITIONER

## 2024-11-19 PROCEDURE — 1126F AMNT PAIN NOTED NONE PRSNT: CPT | Performed by: NURSE PRACTITIONER

## 2024-11-19 RX ORDER — HYDROXYZINE HYDROCHLORIDE 25 MG/1
25 TABLET, FILM COATED ORAL EVERY 8 HOURS PRN
COMMUNITY

## 2024-11-19 RX ORDER — BUSPIRONE HYDROCHLORIDE 10 MG/1
10 TABLET ORAL 3 TIMES DAILY
COMMUNITY
End: 2024-11-19 | Stop reason: ALTCHOICE

## 2024-11-19 RX ORDER — ALBUTEROL SULFATE 90 UG/1
2 INHALANT RESPIRATORY (INHALATION) EVERY 4 HOURS PRN
COMMUNITY
Start: 2024-09-15

## 2024-11-19 RX ORDER — ARIPIPRAZOLE 10 MG/1
10 TABLET ORAL 2 TIMES DAILY
COMMUNITY
End: 2024-11-19 | Stop reason: ALTCHOICE

## 2024-11-19 RX ORDER — AMLODIPINE BESYLATE 10 MG/1
10 TABLET ORAL DAILY
COMMUNITY
Start: 2024-07-04

## 2024-11-19 ASSESSMENT — ENCOUNTER SYMPTOMS
DEPRESSION: 0
LOSS OF SENSATION IN FEET: 0
OCCASIONAL FEELINGS OF UNSTEADINESS: 0

## 2024-11-19 ASSESSMENT — PAIN SCALES - GENERAL: PAINLEVEL_OUTOF10: 0-NO PAIN

## 2024-11-19 NOTE — PROGRESS NOTES
Subjective   Patient ID: Tuyet Ramirez is a 69 y.o. female who presents for New Patient Visit (Tuyet Ramirez has a new visit regarding a lung nodule.  She is a current cigarette smoker for 30 years now.  She smokes one pack per day.  She has a history of colon cancer. Father had small cell sarcoma. /).  HPI 69-year-old female presents today for lung nodule clinic.    She is a current cigarette smoker for 30 years now.  She smokes one pack per day.  She has a history of colon cancer. Father had small cell sarcoma.     Telephone visit between Tuyet Ramirez and Kirsten Medina CNP at [facility] lung nodule clinic    11/9/2024 CT Angio Chest for PE   There is stable spiculated density within the apicoposterior segment of the left  upper lobe measuring 1.4 cm transversely unchanged. There are several  tiny granuloma seen within the lungs. Centrilobular pulmonary  emphysema is noted. There is a mucous plug seen within the right  lower lobe on axial images 182 through 191.    CT chest abdomen and pelvis with IV contrast dated 4/30/2024  An subpleural irregular opacity with scirrhous appearing margination  is present at the left apex posteriorly, measuring up to  approximately 1.4 cm. This has increased from approximately 9 mm on  the previous CT of the chest of 11/18/2021. This is probably due to  progression of apical fibrosis, however malignancy is not excluded.  Follow-up or correlation with PET imaging would be recommended. Mild  centrilobular emphysema appears progressed. Several right micro  nodules are stable, considered benign and probably granulomas. There  is a new calcified granuloma in the right upper lung. No  consolidative infiltrates or effusions.    CT chest abdomen and office with IV contrast dated 11/18/2021  Emphysema is present with scattered small bulla throughout the lungs. Biapical irregular pleural thickening/scarring is not significantly changed including an  irregular approximate 9 mm  somewhat nodular region in the left apex.  Mild multifocal irregular predominantly peripheral atelectasis and/or  scarring is greatest toward the lung bases. No new localized  infiltrate or consolidation.     Few small scattered calcified granulomas are again seen in the lungs.  Left lower lobe 4 mm nodule (image 202 of 302) is also stable. No new  significant pulmonary nodule is identified.  Review of Systems  Review of systems: Present-feeling well. Not present-chills, fatigue and fever.  Respiratory: Not present-difficulty breathing, cough, bloody sputum.  Cardiovascular: Not present-chest pain, palpitations, dyspnea on exertion.  Objective   There were no vitals taken for this visit.   Assessment/Plan   Diagnoses and all orders for this visit:  Pulmonary nodule 1 cm or greater in diameter  -     NM PET CT lung CA initial diagnosis; Future  -     Referral to Thoracic Surgery; Future  Lung nodule  -     Referral to Lung Nodule Center  -     NM PET CT lung CA initial diagnosis; Future  History of colon cancer  -     NM PET CT lung CA initial diagnosis; Future  -     Referral to Thoracic Surgery; Future  Smoker  -     NM PET CT lung CA initial diagnosis; Future  -     Referral to Thoracic Surgery; Future  Neoplasm of uncertain behavior of lung  -     NM PET CT lung CA initial diagnosis; Future  -     Referral to Thoracic Surgery; Future

## 2024-11-19 NOTE — PATIENT INSTRUCTIONS
Left upper lobe spiculated density measuring approximately 1.4 cm.  This nodule has increased in size from 11/18/2021 CT Chest abdomen and pelvis with IV contrast yet stable from CT Chest abdomen and pelvis with iv contrast 4/30/2024.  History of colon cancer.   Current smoker 1 ppd x 30 years.    Recommend PET CT Lung Cancer initial.   Patient referred to Dr. Tuttle for further evaluation.   Patient encouraged to quit smoking.           Lung Nodule Clinic    [facility], [address]  [place], [place] [address]  Phone [phone]  Fax [phone]  Nurse Coordinator [phone]                                          Welcome to the [facility] Lung Nodule [facility]    Today was the initial consult with the lung nodule clinic to determine proper recommendations for follow up. Your care is coordinated to ensure timely management.  As you know, early detection of cancer is very important.  Nodules that are large, look suspicious or have changed over time is why further evaluation such as the additional imaging test that we have ordered is needed. Our clinic will work closely with you in choosing the best next step.       What is my next step?  We will assist with scheduling scans, results reviews, and referrals for priority appointments.      Who do I call?  Your care coordinator for the lung nodule clinic can be contacted at [phone]  All scheduling needs can be assisted within the Cardiac Surgery/Thoracic Surgery/Lung Nodule Clinic offices at [phone].              Table  Radhika RICHARDSON, Lisbet DP, Karissa STILES, et al. Guidelines for Management of Incidental Pulmonary Nodules Detected on CT Images: From the Fleischner Society 2017. Radiology 2017;284:228-243.      [Negative] : Heme/Lymph [FreeTextEntry5] : see hpi

## 2024-12-09 ENCOUNTER — APPOINTMENT (OUTPATIENT)
Dept: RADIOLOGY | Facility: CLINIC | Age: 69
End: 2024-12-09
Payer: MEDICARE

## 2024-12-16 ENCOUNTER — HOSPITAL ENCOUNTER (OUTPATIENT)
Dept: RADIOLOGY | Facility: CLINIC | Age: 69
End: 2024-12-16
Payer: MEDICARE

## 2024-12-16 ENCOUNTER — APPOINTMENT (OUTPATIENT)
Dept: RADIOLOGY | Facility: CLINIC | Age: 69
End: 2024-12-16
Payer: MEDICARE

## 2024-12-30 ENCOUNTER — HOSPITAL ENCOUNTER (OUTPATIENT)
Dept: RADIOLOGY | Facility: CLINIC | Age: 69
End: 2024-12-30
Payer: MEDICARE

## 2024-12-30 ENCOUNTER — HOSPITAL ENCOUNTER (OUTPATIENT)
Dept: RADIOLOGY | Facility: CLINIC | Age: 69
Discharge: HOME | End: 2024-12-30
Payer: MEDICARE

## 2024-12-30 DIAGNOSIS — R91.1 LUNG NODULE: ICD-10-CM

## 2024-12-30 DIAGNOSIS — R91.1 PULMONARY NODULE 1 CM OR GREATER IN DIAMETER: ICD-10-CM

## 2024-12-30 DIAGNOSIS — D38.1 NEOPLASM OF UNCERTAIN BEHAVIOR OF LUNG: ICD-10-CM

## 2024-12-30 DIAGNOSIS — F17.200 SMOKER: ICD-10-CM

## 2024-12-30 DIAGNOSIS — Z85.038 HISTORY OF COLON CANCER: ICD-10-CM

## 2024-12-31 ENCOUNTER — TELEPHONE (OUTPATIENT)
Dept: PRIMARY CARE | Facility: CLINIC | Age: 69
End: 2024-12-31
Payer: MEDICARE

## 2024-12-31 NOTE — TELEPHONE ENCOUNTER
Tuyet updated on plan of care, and I will contact her 1/2/2024 with an update on the HELP desk ticket for the order.

## 2025-01-02 ENCOUNTER — TELEPHONE (OUTPATIENT)
Dept: PRIMARY CARE | Facility: CLINIC | Age: 70
End: 2025-01-02
Payer: MEDICARE

## 2025-01-07 ENCOUNTER — TELEPHONE (OUTPATIENT)
Dept: PRIMARY CARE | Facility: CLINIC | Age: 70
End: 2025-01-07
Payer: MEDICARE

## 2025-01-13 ENCOUNTER — APPOINTMENT (OUTPATIENT)
Dept: RADIOLOGY | Facility: CLINIC | Age: 70
End: 2025-01-13
Payer: MEDICARE

## 2025-01-21 NOTE — PROGRESS NOTES
"Subjective   Tuyet Ramirez  is a 69 y.o. female who presents for evaluation of a 1.4 cm left upper lobe lung nodule.    Currently the patient is {Usual state of health:88236}. She {CWT report deny:30885}. {WILDorBLANK:56747::\" \"}    {CWT [facility] stuff:35285}    She  reports that she has been smoking cigarettes. She has been exposed to tobacco smoke. She has never used smokeless tobacco. She reports current alcohol use. She reports current drug use. Frequency: 21.00 times per week. Drug: Marijuana.    Objective   Physical Exam  {CWT exam:30785}  Diagnostic Studies  {CWT reviewed:35453}    Assessment/Plan   I believe that the patient {CWT doing well:10325}.     {CWT plan smart text:80714}    I recommend {CWTworkup:18253}    I discussed this in detail with the patient, including a discussion of alternatives. They were comfortable with this approach.     Josh Tuttle MD  [phone]    "

## 2025-01-22 ENCOUNTER — TELEPHONE (OUTPATIENT)
Dept: PRIMARY CARE | Facility: CLINIC | Age: 70
End: 2025-01-22
Payer: MEDICARE

## 2025-01-22 NOTE — TELEPHONE ENCOUNTER
Patient called to say her PET Scan was cancelled because of her eating before the test. Patient was told to reschedule an appointment with provider.

## 2025-01-23 ENCOUNTER — APPOINTMENT (OUTPATIENT)
Dept: SURGERY | Facility: HOSPITAL | Age: 70
End: 2025-01-23
Payer: MEDICARE

## 2025-02-03 ENCOUNTER — APPOINTMENT (OUTPATIENT)
Dept: RADIOLOGY | Facility: CLINIC | Age: 70
End: 2025-02-03
Payer: MEDICARE

## 2025-02-03 ENCOUNTER — HOSPITAL ENCOUNTER (OUTPATIENT)
Dept: RADIOLOGY | Facility: CLINIC | Age: 70
Discharge: HOME | End: 2025-02-03
Payer: MEDICARE

## 2025-02-03 DIAGNOSIS — Z78.0 ASYMPTOMATIC MENOPAUSAL STATE: ICD-10-CM

## 2025-02-03 PROCEDURE — 77080 DXA BONE DENSITY AXIAL: CPT | Performed by: RADIOLOGY

## 2025-02-03 PROCEDURE — 77080 DXA BONE DENSITY AXIAL: CPT

## 2025-02-04 DIAGNOSIS — F17.200 SMOKER: ICD-10-CM

## 2025-02-04 DIAGNOSIS — D38.1 NEOPLASM OF UNCERTAIN BEHAVIOR OF LUNG: ICD-10-CM

## 2025-02-04 DIAGNOSIS — R91.1 PULMONARY NODULE 1 CM OR GREATER IN DIAMETER: Primary | ICD-10-CM

## 2025-02-06 ENCOUNTER — APPOINTMENT (OUTPATIENT)
Dept: RADIOLOGY | Facility: CLINIC | Age: 70
End: 2025-02-06
Payer: MEDICARE

## 2025-02-12 ENCOUNTER — HOSPITAL ENCOUNTER (OUTPATIENT)
Dept: RADIOLOGY | Facility: HOSPITAL | Age: 70
Discharge: HOME | End: 2025-02-12
Payer: MEDICARE

## 2025-02-12 VITALS — WEIGHT: 130 LBS | BODY MASS INDEX: 25.52 KG/M2 | HEIGHT: 60 IN

## 2025-02-12 DIAGNOSIS — Z12.31 ENCOUNTER FOR SCREENING MAMMOGRAM FOR MALIGNANT NEOPLASM OF BREAST: ICD-10-CM

## 2025-02-12 PROCEDURE — 77063 BREAST TOMOSYNTHESIS BI: CPT | Performed by: STUDENT IN AN ORGANIZED HEALTH CARE EDUCATION/TRAINING PROGRAM

## 2025-02-12 PROCEDURE — 77067 SCR MAMMO BI INCL CAD: CPT | Performed by: STUDENT IN AN ORGANIZED HEALTH CARE EDUCATION/TRAINING PROGRAM

## 2025-02-12 PROCEDURE — 77067 SCR MAMMO BI INCL CAD: CPT

## 2025-02-17 NOTE — PROGRESS NOTES
"Subjective   Tuyet Ramirez  is a 69 y.o. female who presents for evaluation of a 1.4 cm left upper lobe lung nodule.    Currently the patient is {Usual state of health:96824}. She {CWT report deny:59685}. {WILDorBLANK:67737::\" \"}    {CWT [facility] stuff:74191}    She  reports that she has been smoking cigarettes. She has been exposed to tobacco smoke. She has never used smokeless tobacco. She reports current alcohol use. She reports current drug use. Frequency: 21.00 times per week. Drug: Marijuana.    Objective   Physical Exam  {CWT exam:66804}  Diagnostic Studies  {CWT reviewed:00700}    Assessment/Plan   I believe that the patient {CWT doing well:73166}.     {CWT plan smart text:51981}    I recommend {CWTworkup:17660}    I discussed this in detail with the patient, including a discussion of alternatives. They were comfortable with this approach.     Josh Tuttle MD  [phone]    "

## 2025-02-24 ENCOUNTER — HOSPITAL ENCOUNTER (OUTPATIENT)
Dept: RADIOLOGY | Facility: CLINIC | Age: 70
End: 2025-02-24
Payer: MEDICARE

## 2025-02-25 NOTE — PROGRESS NOTES
"Subjective   Tuyet Ramirez  is a 69 y.o. female who presents for evaluation of a left upper lobe nodule.    In September 2024 this patient presented to the hospital with 5 days of pleuritic chest pain.  She received a CT scan of the chest that noted acute rib fractures.  Concurrently they noted a 1.4 cm left upper lobe lung nodule along with \"several tiny granuloma seen within the lungs\", which appear grossly stable.  She received follow-up imaging with a PET scan on 3/3/2025, which showed mild FDG avidity with a left upper lobe 1.5 cm lesion (SUV 2.7).  Radiology recommended \"correlation with tissue sampling to exclude a neoplastic process\".    Currently the patient is  \"pretty good except for my broken ribs\". Her breathing is \"pretty good\".  . She denies the following symptoms: shortness of breath at rest, cough, hemoptysis, fevers, chills, and weight loss.      She  has a past medical history of Colon cancer (Multi), Lung nodule, and Nausea (01/14/2022). She has hypertension, high cholesterol, GERD, COPD.   She  has a past surgical history that includes Other surgical history (11/04/2021); Other surgical history (11/04/2021); Other surgical history (01/14/2022); Other surgical history (01/14/2022); and Other surgical history (12/01/2021). No surgery in chest.   She   Family History   Problem Relation Name Age of Onset    Other (small cell sarcoma) Father         She  reports that she has been smoking cigarettes. She has been exposed to tobacco smoke. She has never used smokeless tobacco. She reports current alcohol use. She reports current drug use. Frequency: 21.00 times per week. Drug: Marijuana. 1ppd from age 17.     Objective   Physical Exam  The patient is well-appearing and in no acute distress. The trachea is midline and there is no crepitus. The lungs were clear to auscultation grossly. There was good effort and excursion. The heart had a regular rate and rhythm. The abdomen was soft, nontender and " nondistended. The extremities had no edema or gross deformities. Mood and affect are appropriate.  Diagnostic Studies  I reviewed the test results described in the HPI    Assessment/Plan   I believe that the patient has a lung nodule of unclear etiology.     Based on the patient's clinical presentation and available radiographic imaging, The etiology of the nodule is unclear.  I estimate the likelihood of malignancy as moderate.  The interval growth since 2021 and the increased FDG activity to me suggest increased risk of cancer.  We discussed various management strategies including surgery, biopsy, and observation.  Based on this discussion, the patient elected for Ct biopsy    I recommend IR Biopsy    I discussed this in detail with the patient, including a discussion of alternatives. They were comfortable with this approach.     Josh Tuttle MD  [phone]

## 2025-02-26 ENCOUNTER — APPOINTMENT (OUTPATIENT)
Dept: SURGERY | Facility: CLINIC | Age: 70
End: 2025-02-26
Payer: MEDICARE

## 2025-03-03 ENCOUNTER — HOSPITAL ENCOUNTER (OUTPATIENT)
Dept: RADIOLOGY | Facility: CLINIC | Age: 70
Discharge: HOME | End: 2025-03-03
Payer: MEDICARE

## 2025-03-03 PROCEDURE — A9552 F18 FDG: HCPCS | Performed by: NURSE PRACTITIONER

## 2025-03-03 PROCEDURE — 3430000001 HC RX 343 DIAGNOSTIC RADIOPHARMACEUTICALS: Performed by: NURSE PRACTITIONER

## 2025-03-03 PROCEDURE — 78815 PET IMAGE W/CT SKULL-THIGH: CPT | Mod: PI

## 2025-03-03 RX ORDER — FLUDEOXYGLUCOSE F 18 200 MCI/ML
10.9 INJECTION, SOLUTION INTRAVENOUS
Status: COMPLETED | OUTPATIENT
Start: 2025-03-03 | End: 2025-03-03

## 2025-03-03 RX ADMIN — FLUDEOXYGLUCOSE F 18 10.9 MILLICURIE: 200 INJECTION, SOLUTION INTRAVENOUS at 08:08

## 2025-03-04 RX ORDER — TIOTROPIUM BROMIDE AND OLODATEROL 3.124; 2.736 UG/1; UG/1
2 SPRAY, METERED RESPIRATORY (INHALATION) DAILY
COMMUNITY
Start: 2025-01-13

## 2025-03-04 RX ORDER — ALENDRONATE SODIUM 70 MG/1
70 TABLET ORAL
COMMUNITY
Start: 2025-02-12

## 2025-03-05 ENCOUNTER — OFFICE VISIT (OUTPATIENT)
Dept: SURGERY | Facility: CLINIC | Age: 70
End: 2025-03-05
Payer: MEDICARE

## 2025-03-05 VITALS
HEIGHT: 60 IN | SYSTOLIC BLOOD PRESSURE: 123 MMHG | WEIGHT: 143.2 LBS | TEMPERATURE: 98.2 F | OXYGEN SATURATION: 95 % | BODY MASS INDEX: 28.11 KG/M2 | HEART RATE: 75 BPM | DIASTOLIC BLOOD PRESSURE: 74 MMHG

## 2025-03-05 DIAGNOSIS — R91.1 LUNG NODULE: Primary | ICD-10-CM

## 2025-03-05 PROCEDURE — 3008F BODY MASS INDEX DOCD: CPT | Performed by: THORACIC SURGERY (CARDIOTHORACIC VASCULAR SURGERY)

## 2025-03-05 PROCEDURE — 99215 OFFICE O/P EST HI 40 MIN: CPT | Performed by: THORACIC SURGERY (CARDIOTHORACIC VASCULAR SURGERY)

## 2025-03-05 PROCEDURE — 1159F MED LIST DOCD IN RCRD: CPT | Performed by: THORACIC SURGERY (CARDIOTHORACIC VASCULAR SURGERY)

## 2025-03-05 PROCEDURE — 3078F DIAST BP <80 MM HG: CPT | Performed by: THORACIC SURGERY (CARDIOTHORACIC VASCULAR SURGERY)

## 2025-03-05 PROCEDURE — 1126F AMNT PAIN NOTED NONE PRSNT: CPT | Performed by: THORACIC SURGERY (CARDIOTHORACIC VASCULAR SURGERY)

## 2025-03-05 PROCEDURE — 99205 OFFICE O/P NEW HI 60 MIN: CPT | Performed by: THORACIC SURGERY (CARDIOTHORACIC VASCULAR SURGERY)

## 2025-03-05 PROCEDURE — 3074F SYST BP LT 130 MM HG: CPT | Performed by: THORACIC SURGERY (CARDIOTHORACIC VASCULAR SURGERY)

## 2025-03-05 ASSESSMENT — ENCOUNTER SYMPTOMS
DEPRESSION: 0
OCCASIONAL FEELINGS OF UNSTEADINESS: 0
LOSS OF SENSATION IN FEET: 0

## 2025-03-05 ASSESSMENT — PAIN SCALES - GENERAL: PAINLEVEL_OUTOF10: 0-NO PAIN

## 2025-03-31 ENCOUNTER — HOSPITAL ENCOUNTER (OUTPATIENT)
Dept: RADIOLOGY | Facility: HOSPITAL | Age: 70
Discharge: HOME | End: 2025-03-31
Payer: MEDICARE

## 2025-03-31 VITALS
HEART RATE: 67 BPM | OXYGEN SATURATION: 97 % | WEIGHT: 135 LBS | HEIGHT: 60 IN | BODY MASS INDEX: 26.5 KG/M2 | RESPIRATION RATE: 18 BRPM | DIASTOLIC BLOOD PRESSURE: 78 MMHG | TEMPERATURE: 98.1 F | SYSTOLIC BLOOD PRESSURE: 133 MMHG

## 2025-03-31 DIAGNOSIS — R91.1 LUNG NODULE: ICD-10-CM

## 2025-03-31 LAB
ERYTHROCYTE [DISTWIDTH] IN BLOOD BY AUTOMATED COUNT: 12.9 % (ref 11.5–14.5)
HCT VFR BLD AUTO: 43.4 % (ref 36–46)
HGB BLD-MCNC: 14.2 G/DL (ref 12–16)
INR PPP: 1 (ref 0.9–1.1)
MCH RBC QN AUTO: 30.5 PG (ref 26–34)
MCHC RBC AUTO-ENTMCNC: 32.7 G/DL (ref 32–36)
MCV RBC AUTO: 93 FL (ref 80–100)
NRBC BLD-RTO: 0 /100 WBCS (ref 0–0)
PLATELET # BLD AUTO: 228 X10*3/UL (ref 150–450)
PROTHROMBIN TIME: 11.1 SECONDS (ref 9.8–12.4)
RBC # BLD AUTO: 4.66 X10*6/UL (ref 4–5.2)
WBC # BLD AUTO: 5.3 X10*3/UL (ref 4.4–11.3)

## 2025-03-31 PROCEDURE — 85610 PROTHROMBIN TIME: CPT | Performed by: RADIOLOGY

## 2025-03-31 PROCEDURE — 99152 MOD SED SAME PHYS/QHP 5/>YRS: CPT

## 2025-03-31 PROCEDURE — 7100000009 HC PHASE TWO TIME - INITIAL BASE CHARGE

## 2025-03-31 PROCEDURE — 99152 MOD SED SAME PHYS/QHP 5/>YRS: CPT | Performed by: RADIOLOGY

## 2025-03-31 PROCEDURE — 2500000005 HC RX 250 GENERAL PHARMACY W/O HCPCS: Performed by: RADIOLOGY

## 2025-03-31 PROCEDURE — 2500000004 HC RX 250 GENERAL PHARMACY W/ HCPCS (ALT 636 FOR OP/ED): Performed by: RADIOLOGY

## 2025-03-31 PROCEDURE — 36415 COLL VENOUS BLD VENIPUNCTURE: CPT | Performed by: RADIOLOGY

## 2025-03-31 PROCEDURE — 71045 X-RAY EXAM CHEST 1 VIEW: CPT

## 2025-03-31 PROCEDURE — 85027 COMPLETE CBC AUTOMATED: CPT | Performed by: RADIOLOGY

## 2025-03-31 PROCEDURE — 7100000010 HC PHASE TWO TIME - EACH INCREMENTAL 1 MINUTE

## 2025-03-31 PROCEDURE — 99153 MOD SED SAME PHYS/QHP EA: CPT

## 2025-03-31 PROCEDURE — 88341 IMHCHEM/IMCYTCHM EA ADD ANTB: CPT | Mod: TC,PARLAB | Performed by: THORACIC SURGERY (CARDIOTHORACIC VASCULAR SURGERY)

## 2025-03-31 PROCEDURE — 32408 CORE NDL BX LNG/MED PERQ: CPT

## 2025-03-31 PROCEDURE — 2720000007 HC OR 272 NO HCPCS

## 2025-03-31 RX ORDER — MIDAZOLAM HYDROCHLORIDE 1 MG/ML
INJECTION, SOLUTION INTRAMUSCULAR; INTRAVENOUS
Status: COMPLETED | OUTPATIENT
Start: 2025-03-31 | End: 2025-03-31

## 2025-03-31 RX ORDER — LIDOCAINE HYDROCHLORIDE 10 MG/ML
INJECTION, SOLUTION EPIDURAL; INFILTRATION; INTRACAUDAL; PERINEURAL
Status: COMPLETED | OUTPATIENT
Start: 2025-03-31 | End: 2025-03-31

## 2025-03-31 RX ORDER — FENTANYL CITRATE 50 UG/ML
INJECTION, SOLUTION INTRAMUSCULAR; INTRAVENOUS
Status: COMPLETED | OUTPATIENT
Start: 2025-03-31 | End: 2025-03-31

## 2025-03-31 RX ADMIN — MIDAZOLAM 1 MG: 1 INJECTION INTRAMUSCULAR; INTRAVENOUS at 10:14

## 2025-03-31 RX ADMIN — Medication 3 L/MIN: at 09:56

## 2025-03-31 RX ADMIN — FENTANYL CITRATE 50 MCG: 50 INJECTION, SOLUTION INTRAMUSCULAR; INTRAVENOUS at 10:14

## 2025-03-31 RX ADMIN — LIDOCAINE HYDROCHLORIDE 7 ML: 10 INJECTION, SOLUTION EPIDURAL; INFILTRATION; INTRACAUDAL; PERINEURAL at 10:26

## 2025-03-31 ASSESSMENT — PAIN SCALES - GENERAL

## 2025-03-31 ASSESSMENT — PAIN - FUNCTIONAL ASSESSMENT

## 2025-03-31 NOTE — NURSING NOTE
Homegoing instructions provided to patient and daughter. Denies need for wheelchair at discharge. Bandaid at biopsy site wnl.

## 2025-03-31 NOTE — PRE-PROCEDURE NOTE
Interventional Radiology Preprocedure Note    Indication for procedure: The encounter diagnosis was Lung nodule.    Relevant review of systems: NA    Relevant Labs:   Lab Results   Component Value Date    CREATININE 0.70 11/12/2024    EGFR >90 11/12/2024    INR 1.0 04/30/2024    PROTIME 11.1 04/30/2024       Planned Sedation/Anesthesia: Moderate    Airway assessment: normal    Directed physical examination:    Aox3  No increased work of breathing.   No acute distress      Mallampati: II (hard and soft palate, upper portion of tonsils and uvula visible)    ASA Score: ASA 2 - Patient with mild systemic disease with no functional limitations    Benefits, risks and alternatives of procedure and planned sedation have been discussed with the patient and/or their representative. All questions answered and they agree to proceed.

## 2025-03-31 NOTE — PROCEDURES
Interventional Radiology Brief Postprocedure Note    Attending: Anthony Magdaleno MD    Assistant:   Staff Role   Kenan Herzog, RN Radiology Nurse   Jhonatan Murdock Radiology Technologist   Anthony Magdaleno MD Radiologist       Diagnosis:   1. Lung nodule  CT guided percutaneous biopsy lung    CT guided percutaneous biopsy lung    Surgical Pathology Exam    Surgical Pathology Exam          Description of procedure: CT guided percutaneous biopsy lung    Timeout:  Yes    Procedure Area: Procedure Area     Anesthesia:   Conscious Sedation    Complications: None    Estimated Blood Loss: minimal    Medications (Filter: Administrations occurring from 0950 to 1038 on 03/31/25) As of 03/31/25 1038      oxygen (O2) therapy (L/min) Total volume:  Not documented* Dosing weight:  61.2   *Total volume has not been documented. View each administration to see the amount administered.     Date/Time Rate/Dose/Volume Action       03/31/25  0956 3 L/min - 180,000 mL/hr Start               fentaNYL PF (Sublimaze) injection (mcg) Total dose:  50 mcg      Date/Time Rate/Dose/Volume Action       03/31/25  1014 50 mcg Given               midazolam (Versed) injection (mg) Total dose:  1 mg      Date/Time Rate/Dose/Volume Action       03/31/25  1014 1 mg Given               lidocaine PF (Xylocaine) 10 mg/mL (1 %) injection (mL) Total volume:  7 mL      Date/Time Rate/Dose/Volume Action       03/31/25  1026 7 mL Given                   ID Type Source Tests Collected by Time   1 : BRIGHT Nodule Tissue LUNG BIOPSY LEFT (SPECIFY SITE) SURGICAL PATHOLOGY EXAM Anthony Magdaleno MD 3/31/2025 1056         See detailed result report with images in PACS.    The patient tolerated the procedure well without incident or complication and is in stable condition.

## 2025-04-01 ASSESSMENT — PAIN SCALES - GENERAL: PAINLEVEL_OUTOF10: 0 - NO PAIN

## 2025-04-04 LAB
LAB AP ASR DISCLAIMER: NORMAL
LABORATORY COMMENT REPORT: NORMAL
PATH REPORT.ADDENDUM SPEC: NORMAL
PATH REPORT.COMMENTS IMP SPEC: NORMAL
PATH REPORT.FINAL DX SPEC: NORMAL
PATH REPORT.GROSS SPEC: NORMAL
PATH REPORT.RELEVANT HX SPEC: NORMAL
PATH REPORT.TOTAL CANCER: NORMAL

## 2025-04-09 DIAGNOSIS — C34.92 ADENOCARCINOMA OF LEFT LUNG (MULTI): Primary | ICD-10-CM

## 2025-04-11 LAB
ELECTRONICALLY SIGNED BY: NORMAL
FOCUSED SOLID TUMOR DNA/RNA RESULTS: NORMAL

## 2025-04-11 PROCEDURE — 81458 SO GSAP DNA CPY NMBR&MCRSTL: CPT | Performed by: THORACIC SURGERY (CARDIOTHORACIC VASCULAR SURGERY)

## 2025-04-11 PROCEDURE — G0452 MOLECULAR PATHOLOGY INTERPR: HCPCS | Performed by: THORACIC SURGERY (CARDIOTHORACIC VASCULAR SURGERY)

## 2025-04-30 NOTE — H&P (VIEW-ONLY)
"José Ramirez  is a 69 y.o. female who presents for evaluation of a left upper lobe nodule status post CT biopsy on 3/31/25.  Pathology showed Adenocarcinoma, acinar pattern.      In September 2024 this patient presented to the hospital with 5 days of pleuritic chest pain.  She received a CT scan of the chest that noted acute rib fractures.  Concurrently they noted a 1.4 cm left upper lobe lung nodule along with \"several tiny granuloma seen within the lungs\", which appear grossly stable.  She received follow-up imaging with a PET scan on 3/3/2025, which showed mild FDG avidity with a left upper lobe 1.5 cm lesion (SUV 2.7).  Radiology recommended \"correlation with tissue sampling to exclude a neoplastic process\".  I recommended a CT biopsy.  This was performed on 3/31/2025 and pathology was adenocarcinoma.  She received pulmonary function tests on 5/6/2025 which showed an FEV1 of 66% and a DLCO of 63%    Currently the patient is in their usual state of health. She denies the following symptoms: chest pain, shortness of breath at rest, shortness of breath with activity, cough, hemoptysis, fevers, chills, and weight loss.      PMH: Stoke in 2018, No MI. Tubal igation and colon cancer.     She  reports that she has been smoking cigarettes. She has been exposed to tobacco smoke. She has never used smokeless tobacco. She reports current alcohol use. She reports current drug use. Frequency: 21.00 times per week. Drug: Marijuana.    Objective   Physical Exam  The patient is well-appearing and in no acute distress. The trachea is midline and there is no crepitus. The lungs were clear to auscultation grossly. There was good effort and excursion. The heart had a regular rate and rhythm. The abdomen was soft, nontender and nondistended. The extremities had no edema or gross deformities. Mood and affect are appropriate.  Diagnostic Studies    Transthoracic Echo (TTE) Complete    Narrative  [facility], " [address] [id number]  Tel [phone] and Fax [phone]    TRANSTHORACIC ECHOCARDIOGRAM REPORT      Patient Name:       DEANGELO MAYBERRY      Reading Physician:    [id number] Bro Benz MD  Study Date:         11/11/2024          Ordering Provider:    [id number] ROSALBA T  LAWANDAANNEKEISHA  MRN/PID:            [id number]            Fellow:  Accession#:         [id number]        Nurse:  Date of Birth/Age:  1955 / 69      Sonographer:          Shadi pascal RDCS  Gender assigned at  F                   Additional Staff:  Birth:  Height:             152.00 cm           Admit Date:           11/10/2024  Weight:             52.00 kg            Admission Status:     Inpatient -  Routine  BSA / BMI:          1.47 m2 / 22.51     Encounter#:           [id number]  kg/m2  Blood Pressure:     102/55 mmHg         Department Location:  [facility]    Study Type:    TRANSTHORACIC ECHO (TTE) COMPLETE  Diagnosis/ICD: Abnormal electrocardiogram [ECG] [EKG]-R94.31  Indication:    EKG  CPT Code:      Echo Complete w Full Doppler-96629    Patient History:  Pertinent History: Hyperlipidemia and HTN.    Study Detail: The following Echo studies were performed: 2D, M-Mode, Doppler and  color flow. Technically challenging study due to poor acoustic  windows and body habitus.      PHYSICIAN INTERPRETATION:  Left Ventricle: The left ventricular systolic function is normal, with a visually estimated ejection fraction of 55-60%. There are no regional left ventricular wall motion abnormalities. The left ventricular cavity size is normal. There is mildly increased septal and normal posterior left ventricular wall thickness. Spectral Doppler shows a normal pattern of left ventricular diastolic filling.  Left Atrium: The left atrium is normal in size. Left atrial appendage was not assessed.  Right Ventricle: The right ventricle is normal in size. There is normal right ventricular global  systolic function.  Right Atrium: The right atrium is normal in size.  Aortic Valve: The aortic valve is structurally normal. The aortic valve dimensionless index is 0.82. There is no evidence of aortic valve regurgitation. The peak instantaneous gradient of the aortic valve is 5 mmHg. The mean gradient of the aortic valve is 3 mmHg.  Mitral Valve: The mitral valve is normal in structure. There is trace mitral valve regurgitation.  Tricuspid Valve: The tricuspid valve is structurally normal. There is trace tricuspid regurgitation. The right ventricular systolic pressure is unable to be estimated.  Pulmonic Valve: The pulmonic valve is not well visualized. The pulmonic valve regurgitation was not well visualized.  Pericardium: There is no pericardial effusion noted.  Aorta: The aortic root is normal.      CONCLUSIONS:  1. The left ventricular systolic function is normal, with a visually estimated ejection fraction of 55-60%.    QUANTITATIVE DATA SUMMARY:    2D MEASUREMENTS:          Normal Ranges:  IVSd:            1.02 cm  (0.6-1.1cm)  LVPWd:           0.66 cm  (0.6-1.1cm)  LVIDd:           4.27 cm  (3.9-5.9cm)  LVIDs:           2.57 cm  LV Mass Index:   75 g/m2  LVEDV Index:     47 ml/m2  LV % FS          39.8 %      AORTA MEASUREMENTS:         Normal Ranges:  Asc Ao, d:          3.10 cm (2.1-3.4cm)      LV SYSTOLIC FUNCTION BY 2D PLANIMETRY (MOD):  Normal Ranges:  EF-A4C View:    50 % (>=55%)  EF-A2C View:    64 %  EF-Biplane:     59 %  EF-Visual:      58 %  LV EF Reported: 58 %      LV DIASTOLIC FUNCTION:           Normal Ranges:  MV Peak E:             0.95 m/s  (0.7-1.2 m/s)  MV Peak A:             0.78 m/s  (0.42-0.7 m/s)  E/A Ratio:             1.21      (1.0-2.2)  MV e'                  0.088 m/s (>8.0)  MV lateral e'          0.10 m/s  MV medial e'           0.08 m/s  E/e' Ratio:            10.81     (<8.0)      MITRAL VALVE:          Normal Ranges:  MV DT:        203 msec (150-240msec)      AORTIC VALVE:                      Normal Ranges:  AoV Vmax:                1.11 m/s (<=1.7m/s)  AoV Peak P.9 mmHg (<20mmHg)  AoV Mean PG:             3.0 mmHg (1.7-11.5mmHg)  LVOT Max Reg:            1.01 m/s (<=1.1m/s)  AoV VTI:                 23.50 cm (18-25cm)  LVOT VTI:                19.20 cm  LVOT Diameter:           1.70 cm  (1.8-2.4cm)  AoV Area, VTI:           1.85 cm2 (2.5-5.5cm2)  AoV Area,Vmax:           2.07 cm2 (2.5-4.5cm2)  AoV Dimensionless Index: 0.82      RIGHT VENTRICLE:  RV Basal 2.83 cm  RV Mid   1.95 cm  RV Major 6.0 cm  TAPSE:   23.0 mm  RV s'    0.14 m/s      TRICUSPID VALVE/RVSP:          Normal Ranges:  Peak TR Velocity:     2.72 m/s      PULMONIC VALVE:          Normal Ranges:  PV Max Reg:     0.8 m/s  (0.6-0.9m/s)  PV Max P.5 mmHg  PV Mean P.5 mmHg  PV VTI:         17.40 cm        Assessment/Plan   I believe that the patient has a new diagnosis of cancer.     This patient has a new diagnosis of lung cancer.  She has a 1.5 cm left upper lobe tumor which is biopsy-proven adenocarcinoma.  Her PET/CT suggest no evidence of lymph node metastasis.  I believe sublobar resection is ideal given her reduced pulmonary function testing.  Given its peripheral location, I believe a left upper lobe wedge resection is reasonable.  We discussed the surgery in detail. I carefully described the risks, benefits, and alternatives to surgical intervention. We discussed the possibility of serious complications including death. I answered any questions they expressed. After this, the patient agreed to proceed with surgery    I recommend surgery.  Robotic left upper lobe wedge (NEEDS CARDIAC CLEARANCE) - has questions about having her teeth pulled. Possible OR dates are  or     I discussed this in detail with the patient, including a discussion of alternatives. They were comfortable with this approach.     Josh Tuttle MD  [phone]

## 2025-04-30 NOTE — PROGRESS NOTES
"José Ramirez  is a 69 y.o. female who presents for evaluation of a left upper lobe nodule status post CT biopsy on 3/31/25.  Pathology showed Adenocarcinoma, acinar pattern.      In September 2024 this patient presented to the hospital with 5 days of pleuritic chest pain.  She received a CT scan of the chest that noted acute rib fractures.  Concurrently they noted a 1.4 cm left upper lobe lung nodule along with \"several tiny granuloma seen within the lungs\", which appear grossly stable.  She received follow-up imaging with a PET scan on 3/3/2025, which showed mild FDG avidity with a left upper lobe 1.5 cm lesion (SUV 2.7).  Radiology recommended \"correlation with tissue sampling to exclude a neoplastic process\".  I recommended a CT biopsy.  This was performed on 3/31/2025 and pathology was adenocarcinoma.  She received pulmonary function tests on 5/6/2025 which showed an FEV1 of 66% and a DLCO of 63%    Currently the patient is in their usual state of health. She denies the following symptoms: chest pain, shortness of breath at rest, shortness of breath with activity, cough, hemoptysis, fevers, chills, and weight loss.      PMH: Stoke in 2018, No MI. Tubal igation and colon cancer.     She  reports that she has been smoking cigarettes. She has been exposed to tobacco smoke. She has never used smokeless tobacco. She reports current alcohol use. She reports current drug use. Frequency: 21.00 times per week. Drug: Marijuana.    Objective   Physical Exam  The patient is well-appearing and in no acute distress. The trachea is midline and there is no crepitus. The lungs were clear to auscultation grossly. There was good effort and excursion. The heart had a regular rate and rhythm. The abdomen was soft, nontender and nondistended. The extremities had no edema or gross deformities. Mood and affect are appropriate.  Diagnostic Studies    Transthoracic Echo (TTE) Complete    Narrative  [facility], " [address] [id number]  Tel [phone] and Fax [phone]    TRANSTHORACIC ECHOCARDIOGRAM REPORT      Patient Name:       DEANGELO MAYBERRY      Reading Physician:    [id number] Bro Benz MD  Study Date:         11/11/2024          Ordering Provider:    [id number] ROSALBA T  LAWANDAANNEKEIHSA  MRN/PID:            [id number]            Fellow:  Accession#:         [id number]        Nurse:  Date of Birth/Age:  1955 / 69      Sonographer:          Shadi pascal RDCS  Gender assigned at  F                   Additional Staff:  Birth:  Height:             152.00 cm           Admit Date:           11/10/2024  Weight:             52.00 kg            Admission Status:     Inpatient -  Routine  BSA / BMI:          1.47 m2 / 22.51     Encounter#:           [id number]  kg/m2  Blood Pressure:     102/55 mmHg         Department Location:  [facility]    Study Type:    TRANSTHORACIC ECHO (TTE) COMPLETE  Diagnosis/ICD: Abnormal electrocardiogram [ECG] [EKG]-R94.31  Indication:    EKG  CPT Code:      Echo Complete w Full Doppler-31157    Patient History:  Pertinent History: Hyperlipidemia and HTN.    Study Detail: The following Echo studies were performed: 2D, M-Mode, Doppler and  color flow. Technically challenging study due to poor acoustic  windows and body habitus.      PHYSICIAN INTERPRETATION:  Left Ventricle: The left ventricular systolic function is normal, with a visually estimated ejection fraction of 55-60%. There are no regional left ventricular wall motion abnormalities. The left ventricular cavity size is normal. There is mildly increased septal and normal posterior left ventricular wall thickness. Spectral Doppler shows a normal pattern of left ventricular diastolic filling.  Left Atrium: The left atrium is normal in size. Left atrial appendage was not assessed.  Right Ventricle: The right ventricle is normal in size. There is normal right ventricular global  systolic function.  Right Atrium: The right atrium is normal in size.  Aortic Valve: The aortic valve is structurally normal. The aortic valve dimensionless index is 0.82. There is no evidence of aortic valve regurgitation. The peak instantaneous gradient of the aortic valve is 5 mmHg. The mean gradient of the aortic valve is 3 mmHg.  Mitral Valve: The mitral valve is normal in structure. There is trace mitral valve regurgitation.  Tricuspid Valve: The tricuspid valve is structurally normal. There is trace tricuspid regurgitation. The right ventricular systolic pressure is unable to be estimated.  Pulmonic Valve: The pulmonic valve is not well visualized. The pulmonic valve regurgitation was not well visualized.  Pericardium: There is no pericardial effusion noted.  Aorta: The aortic root is normal.      CONCLUSIONS:  1. The left ventricular systolic function is normal, with a visually estimated ejection fraction of 55-60%.    QUANTITATIVE DATA SUMMARY:    2D MEASUREMENTS:          Normal Ranges:  IVSd:            1.02 cm  (0.6-1.1cm)  LVPWd:           0.66 cm  (0.6-1.1cm)  LVIDd:           4.27 cm  (3.9-5.9cm)  LVIDs:           2.57 cm  LV Mass Index:   75 g/m2  LVEDV Index:     47 ml/m2  LV % FS          39.8 %      AORTA MEASUREMENTS:         Normal Ranges:  Asc Ao, d:          3.10 cm (2.1-3.4cm)      LV SYSTOLIC FUNCTION BY 2D PLANIMETRY (MOD):  Normal Ranges:  EF-A4C View:    50 % (>=55%)  EF-A2C View:    64 %  EF-Biplane:     59 %  EF-Visual:      58 %  LV EF Reported: 58 %      LV DIASTOLIC FUNCTION:           Normal Ranges:  MV Peak E:             0.95 m/s  (0.7-1.2 m/s)  MV Peak A:             0.78 m/s  (0.42-0.7 m/s)  E/A Ratio:             1.21      (1.0-2.2)  MV e'                  0.088 m/s (>8.0)  MV lateral e'          0.10 m/s  MV medial e'           0.08 m/s  E/e' Ratio:            10.81     (<8.0)      MITRAL VALVE:          Normal Ranges:  MV DT:        203 msec (150-240msec)      AORTIC VALVE:                      Normal Ranges:  AoV Vmax:                1.11 m/s (<=1.7m/s)  AoV Peak P.9 mmHg (<20mmHg)  AoV Mean PG:             3.0 mmHg (1.7-11.5mmHg)  LVOT Max Reg:            1.01 m/s (<=1.1m/s)  AoV VTI:                 23.50 cm (18-25cm)  LVOT VTI:                19.20 cm  LVOT Diameter:           1.70 cm  (1.8-2.4cm)  AoV Area, VTI:           1.85 cm2 (2.5-5.5cm2)  AoV Area,Vmax:           2.07 cm2 (2.5-4.5cm2)  AoV Dimensionless Index: 0.82      RIGHT VENTRICLE:  RV Basal 2.83 cm  RV Mid   1.95 cm  RV Major 6.0 cm  TAPSE:   23.0 mm  RV s'    0.14 m/s      TRICUSPID VALVE/RVSP:          Normal Ranges:  Peak TR Velocity:     2.72 m/s      PULMONIC VALVE:          Normal Ranges:  PV Max Reg:     0.8 m/s  (0.6-0.9m/s)  PV Max P.5 mmHg  PV Mean P.5 mmHg  PV VTI:         17.40 cm        Assessment/Plan   I believe that the patient has a new diagnosis of cancer.     This patient has a new diagnosis of lung cancer.  She has a 1.5 cm left upper lobe tumor which is biopsy-proven adenocarcinoma.  Her PET/CT suggest no evidence of lymph node metastasis.  I believe sublobar resection is ideal given her reduced pulmonary function testing.  Given its peripheral location, I believe a left upper lobe wedge resection is reasonable.  We discussed the surgery in detail. I carefully described the risks, benefits, and alternatives to surgical intervention. We discussed the possibility of serious complications including death. I answered any questions they expressed. After this, the patient agreed to proceed with surgery    I recommend surgery.  Robotic left upper lobe wedge (NEEDS CARDIAC CLEARANCE) - has questions about having her teeth pulled. Possible OR dates are  or     I discussed this in detail with the patient, including a discussion of alternatives. They were comfortable with this approach.     Josh Tuttle MD  [phone]

## 2025-05-06 ENCOUNTER — HOSPITAL ENCOUNTER (OUTPATIENT)
Dept: RESPIRATORY THERAPY | Facility: HOSPITAL | Age: 70
Discharge: HOME | End: 2025-05-06
Payer: MEDICARE

## 2025-05-06 DIAGNOSIS — C34.92 ADENOCARCINOMA OF LEFT LUNG (MULTI): ICD-10-CM

## 2025-05-06 PROCEDURE — 94726 PLETHYSMOGRAPHY LUNG VOLUMES: CPT

## 2025-05-06 PROCEDURE — 94729 DIFFUSING CAPACITY: CPT | Performed by: INTERNAL MEDICINE

## 2025-05-06 PROCEDURE — 94060 EVALUATION OF WHEEZING: CPT | Performed by: INTERNAL MEDICINE

## 2025-05-06 PROCEDURE — 94726 PLETHYSMOGRAPHY LUNG VOLUMES: CPT | Performed by: INTERNAL MEDICINE

## 2025-05-07 ENCOUNTER — OFFICE VISIT (OUTPATIENT)
Dept: SURGERY | Facility: CLINIC | Age: 70
End: 2025-05-07
Payer: MEDICARE

## 2025-05-07 VITALS
BODY MASS INDEX: 26.6 KG/M2 | SYSTOLIC BLOOD PRESSURE: 105 MMHG | OXYGEN SATURATION: 95 % | HEART RATE: 87 BPM | HEIGHT: 64 IN | WEIGHT: 155.8 LBS | DIASTOLIC BLOOD PRESSURE: 69 MMHG | TEMPERATURE: 98.2 F

## 2025-05-07 DIAGNOSIS — C34.92 ADENOCARCINOMA OF LEFT LUNG (MULTI): ICD-10-CM

## 2025-05-07 DIAGNOSIS — C34.12 PRIMARY CANCER OF LEFT UPPER LOBE OF LUNG (MULTI): Primary | ICD-10-CM

## 2025-05-07 DIAGNOSIS — Z01.818 PRE-OP TESTING: ICD-10-CM

## 2025-05-07 LAB
MGC ASCENT PFT - FEV1 - POST: 1.23
MGC ASCENT PFT - FEV1 - PRE: 1.26
MGC ASCENT PFT - FEV1 - PREDICTED: 1.89
MGC ASCENT PFT - FVC - POST: 2.58
MGC ASCENT PFT - FVC - PRE: 2.55
MGC ASCENT PFT - FVC - PREDICTED: 2.37

## 2025-05-07 PROCEDURE — 99215 OFFICE O/P EST HI 40 MIN: CPT | Mod: 57 | Performed by: THORACIC SURGERY (CARDIOTHORACIC VASCULAR SURGERY)

## 2025-05-07 PROCEDURE — 3078F DIAST BP <80 MM HG: CPT | Performed by: THORACIC SURGERY (CARDIOTHORACIC VASCULAR SURGERY)

## 2025-05-07 PROCEDURE — 1159F MED LIST DOCD IN RCRD: CPT | Performed by: THORACIC SURGERY (CARDIOTHORACIC VASCULAR SURGERY)

## 2025-05-07 PROCEDURE — 3008F BODY MASS INDEX DOCD: CPT | Performed by: THORACIC SURGERY (CARDIOTHORACIC VASCULAR SURGERY)

## 2025-05-07 PROCEDURE — 99215 OFFICE O/P EST HI 40 MIN: CPT | Performed by: THORACIC SURGERY (CARDIOTHORACIC VASCULAR SURGERY)

## 2025-05-07 PROCEDURE — 3074F SYST BP LT 130 MM HG: CPT | Performed by: THORACIC SURGERY (CARDIOTHORACIC VASCULAR SURGERY)

## 2025-05-07 PROCEDURE — 1126F AMNT PAIN NOTED NONE PRSNT: CPT | Performed by: THORACIC SURGERY (CARDIOTHORACIC VASCULAR SURGERY)

## 2025-05-07 RX ORDER — CEFAZOLIN SODIUM 2 G/100ML
2 INJECTION, SOLUTION INTRAVENOUS ONCE
OUTPATIENT
Start: 2025-05-07 | End: 2025-05-07

## 2025-05-07 RX ORDER — TRIAMCINOLONE ACETONIDE 5 MG/G
CREAM TOPICAL
COMMUNITY
Start: 2025-03-24

## 2025-05-07 RX ORDER — ACETAMINOPHEN 325 MG/1
650 TABLET ORAL ONCE
OUTPATIENT
Start: 2025-05-07 | End: 2025-05-07

## 2025-05-07 RX ORDER — HEPARIN SODIUM 5000 [USP'U]/ML
5000 INJECTION, SOLUTION INTRAVENOUS; SUBCUTANEOUS ONCE
OUTPATIENT
Start: 2025-05-07 | End: 2025-05-07

## 2025-05-07 RX ORDER — GABAPENTIN 300 MG/1
300 CAPSULE ORAL ONCE
OUTPATIENT
Start: 2025-05-07 | End: 2025-05-07

## 2025-05-07 ASSESSMENT — PAIN SCALES - GENERAL: PAINLEVEL_OUTOF10: 0-NO PAIN

## 2025-05-07 ASSESSMENT — ENCOUNTER SYMPTOMS
LOSS OF SENSATION IN FEET: 0
DEPRESSION: 0
OCCASIONAL FEELINGS OF UNSTEADINESS: 0

## 2025-05-09 PROBLEM — C34.12 PRIMARY CANCER OF LEFT UPPER LOBE OF LUNG (MULTI): Status: ACTIVE | Noted: 2025-05-07

## 2025-05-15 DIAGNOSIS — Z01.818 PRE-OP TESTING: Primary | ICD-10-CM

## 2025-05-20 NOTE — PROGRESS NOTES
[facility]   Cardio-Oncology Clinic    Primary Care Physician: Lesa Mckeon MD  Patient's Location: [facility] [address]    Date of Visit: 05/21/2025  9:30 AM EDT  Location of visit: [facility] [address] [place] [address]   Type of Visit: Cardio-Oncology - New/Consult    HPI / Summary:   Tuyet Ramirez is a very pleasant 69 y.o. female presenting for cardio-oncology assessment of newly diagnosed lung adenocarcinoma. She has a history of colon cancer, stroke (2018), tobacco use, and hypertension.    Cancer Therapies Received:  - Lung adenocarcinoma diagnosed via CT-guided biopsy (03/2025)  - Planned: Robotic left upper lobe wedge resection (pending cardiac clearance)    Initially identified with a 1.4 cm spiculated left upper lobe pulmonary nodule during workup for pleuritic chest pain and rib fractures in 09/2024. Imaging showed stable granulomas and mild centrilobular emphysema. Serial CTs and PET/CT on 03/03/2025 demonstrated mild FDG avidity in the left upper lobe lesion with no evidence of yulia or distant metastatic disease. CT-guided biopsy on 03/31/2025 confirmed invasive adenocarcinoma. Pulmonary function testing on 05/06/2025 showed moderate impairment (FEV1 66%, DLCO 63%).    She has had no recent cardiopulmonary symptoms. Past hospitalization included a fall with multiple rib fractures. During that admission in 11/2024, she had mildly elevated hs-troponin (peak 21 ng/L), an ECG showing new anteroseptal infarct pattern, and an echo showing preserved LVEF (58%) with normal valves and no wall motion abnormalities. Cardiology cleared her for discharge without further inpatient workup and recommended outpatient follow-up. She is currently being evaluated for preoperative cardiac clearance ahead of a planned robotic wedge resection on 05/27.    EKG (11/2024): Anteroseptal infarct pattern, new from prior.  Echo (11/11/2024): EF 58%, normal RV function, normal valves, no pericardial effusion, mildly  "increased septal wall thickness, normal diastolic filling.    Today:  Tuyet Ramirez is a 69 year old female with coronary artery disease who presents for cardiology clearance prior to lung resection surgery.    She is scheduled for lung resection surgery on May 27, 2025, and requires cardiology clearance. No chest pain or shortness of breath. Her physical activity includes walking four to five times a day, and she can climb stairs without difficulty or shortness of breath.    A CT scan has shown calcification in the coronary arteries. Her last cholesterol check in 2021 showed elevated levels.    She has recently quit smoking, approximately two weeks ago, and is interested in tobacco cessation support to maintain her non-smoking status.    Family history of coronary artery disease, with a brother who has had multiple heart attacks and stents placed.    Objective   Medical History:   She has a past medical history of Colon cancer (Multi), Lung nodule, and Nausea (01/14/2022).  Surgical Hx:   She has a past surgical history that includes Other surgical history (11/04/2021); Other surgical history (11/04/2021); Other surgical history (01/14/2022); Other surgical history (01/14/2022); and Other surgical history (12/01/2021).   Social Hx:   She reports that she has been smoking cigarettes. She has been exposed to tobacco smoke. She has never used smokeless tobacco. She reports current alcohol use. She reports current drug use. Frequency: 21.00 times per week. Drug: Marijuana.  Family Hx:   Her family history includes small cell sarcoma in her father.   Allergies:   RX Allergies[1]  Exam:       5/21/2025     9:17 AM 5/15/2025     9:26 AM 5/7/2025    10:32 AM 3/31/2025     1:25 PM 3/31/2025     1:10 PM 3/31/2025    12:55 PM   Vitals   Systolic 117  105 133 137 126   Diastolic 77  69 78 82 79   Heart Rate 72  87 67 71 67   Temp 36.1 °C (97 °F)  36.8 °C (98.2 °F)      Resp 16   18 16 18   Height   1.626 m (5' 4\")      Weight " (lb) 158.8 155.87 155.8      BMI 27.26 kg/m2 26.75 kg/m2 26.74 kg/m2      BSA (m2) 1.8 m2 1.79 m2 1.79 m2      Visit Report Report  Report        Wt Readings from Last 5 Encounters:   05/21/25 72 kg (158 lb 12.8 oz)   05/07/25 70.7 kg (155 lb 12.8 oz)   03/31/25 61.2 kg (135 lb)   03/05/25 65 kg (143 lb 3.2 oz)   02/12/25 59 kg (130 lb)     GEN: Pleasant, well-appearing, no acute distress.  HEENT: JVP not elevated, no icterus. No HJR  CHEST: No wheeze, good air movement.  CV: Normal rate, regular rhythm. Normal S1, inspiratory split S2, no m/r/g  ABD: Soft, ND, NT  EXT: Warm, well perfused, No LE edema.   NEURO: Pleasant, Oriented to plan    Labs:   CMP:  Recent Labs     11/12/24  0709 11/11/24  0724 11/10/24  0810 11/09/24  2221 11/09/24  1255 05/02/24  0808   * 130* 129* 123* 122* 132*   K 4.2 3.8 4.7 3.8 3.2* 3.9   CL 96* 96* 93* 87* 85* 97*   CO2 30 27 30 30 30 25   ANIONGAP 10 11 11 10 10 14   BUN 10 11 12 16 15 7   CREATININE 0.70 0.59 0.71 0.91 0.96 0.59   EGFR >90 >90 >90 69 65 >90   MG  --   --   --   --  1.67 1.72     Recent Labs     11/09/24  1255 05/02/24  0808 04/30/24  1204 04/03/24  1019   ALBUMIN 3.9 3.7 4.2 3.9   ALKPHOS 60  --  70 56   ALT 15  --  17 15   AST 21  --  26 21   BILITOT 1.1  --  0.5 0.4   LIPASE 11  --   --   --    CBC:  Recent Labs     03/31/25  0913 11/10/24  0810 11/09/24  1255 05/02/24  0808 05/01/24  0656   WBC 5.3 5.7 8.7 6.4 6.1   HGB 14.2 13.4 13.6 12.3 13.3   HCT 43.4 39.2 39.5 36.3 39.1    148* 183 248 227   MCV 93 93 92 87 86   HEME/ENDO:  Recent Labs     03/07/22  1155 06/18/21  1504   FERRITIN 70  --    IRONSAT 20*  --    TSH 1.17 1.25    CARDIAC:   Recent Labs     11/09/24  2221 11/09/24  1714 11/09/24  1255 04/30/24  1316 04/30/24  1204   TROPHS 16* 19* 21* 6 9     Recent Labs     06/18/21  1504 04/25/19  0921   CHOL 203* 242*   LDLF 120* 162*   HDL 57.0 50.0   TRIG 130 148   MICRO:   Recent Labs     11/26/21  0543 11/25/21  1015   CRP 11.10* 8.64*   No  results found for the last 90 days.      Notable Studies, reviewed:   EKG:   Recent Labs     11/09/24  1321 05/03/24  1121 10/20/21  0750   VENTRATE 89 82 77   ATRRATE 89 82 77   QTCFRED 432 428 406   QRSDUR 79 68 68   QTCCALCB 462 450 423   No results found for this or any previous visit (from the past 4464 hours).  Echocardiogram:   Recent Labs     11/11/24  1745   EF 58   LVIDD 4.27   RVFRWALLPKSP 13.50   TAPSE 2.3     Transthoracic Echo (TTE) Complete 11/11/2024  PHYSICIAN INTERPRETATION:  Left Ventricle: The left ventricular systolic function is normal, with a visually estimated ejection fraction of 55-60%. There are no regional left ventricular wall motion abnormalities. The left ventricular cavity size is normal. There is mildly increased septal and normal posterior left ventricular wall thickness. Spectral Doppler shows a normal pattern of left ventricular diastolic filling.  Left Atrium: The left atrium is normal in size. Left atrial appendage was not assessed.  Right Ventricle: The right ventricle is normal in size. There is normal right ventricular global systolic function.  Right Atrium: The right atrium is normal in size.  Aortic Valve: The aortic valve is structurally normal. The aortic valve dimensionless index is 0.82. There is no evidence of aortic valve regurgitation. The peak instantaneous gradient of the aortic valve is 5 mmHg. The mean gradient of the aortic valve is 3 mmHg.  Mitral Valve: The mitral valve is normal in structure. There is trace mitral valve regurgitation.  Tricuspid Valve: The tricuspid valve is structurally normal. There is trace tricuspid regurgitation. The right ventricular systolic pressure is unable to be estimated.  Pulmonic Valve: The pulmonic valve is not well visualized. The pulmonic valve regurgitation was not well visualized.  Pericardium: There is no pericardial effusion noted.  Aorta: The aortic root is normal.      CONCLUSIONS:  1. The left ventricular systolic  function is normal, with a visually estimated ejection fraction of 55-60%.    QUANTITATIVE DATA SUMMARY:    2D MEASUREMENTS:          Normal Ranges:  IVSd:            1.02 cm  (0.6-1.1cm)  LVPWd:           0.66 cm  (0.6-1.1cm)  LVIDd:           4.27 cm  (3.9-5.9cm)  LVIDs:           2.57 cm  LV Mass Index:   75 g/m2  LVEDV Index:     47 ml/m2  LV % FS          39.8 %      AORTA MEASUREMENTS:         Normal Ranges:  Asc Ao, d:          3.10 cm (2.1-3.4cm)      LV SYSTOLIC FUNCTION BY 2D PLANIMETRY (MOD):  Normal Ranges:  EF-A4C View:    50 % (>=55%)  EF-A2C View:    64 %  EF-Biplane:     59 %  EF-Visual:      58 %  LV EF Reported: 58 %      LV DIASTOLIC FUNCTION:           Normal Ranges:  MV Peak E:             0.95 m/s  (0.7-1.2 m/s)  MV Peak A:             0.78 m/s  (0.42-0.7 m/s)  E/A Ratio:             1.21      (1.0-2.2)  MV e'                  0.088 m/s (>8.0)  MV lateral e'          0.10 m/s  MV medial e'           0.08 m/s  E/e' Ratio:            10.81     (<8.0)      MITRAL VALVE:          Normal Ranges:  MV DT:        203 msec (150-240msec)      AORTIC VALVE:                     Normal Ranges:  AoV Vmax:                1.11 m/s (<=1.7m/s)  AoV Peak P.9 mmHg (<20mmHg)  AoV Mean PG:             3.0 mmHg (1.7-11.5mmHg)  LVOT Max Reg:            1.01 m/s (<=1.1m/s)  AoV VTI:                 23.50 cm (18-25cm)  LVOT VTI:                19.20 cm  LVOT Diameter:           1.70 cm  (1.8-2.4cm)  AoV Area, VTI:           1.85 cm2 (2.5-5.5cm2)  AoV Area,Vmax:           2.07 cm2 (2.5-4.5cm2)  AoV Dimensionless Index: 0.82      RIGHT VENTRICLE:  RV Basal 2.83 cm  RV Mid   1.95 cm  RV Major 6.0 cm  TAPSE:   23.0 mm  RV s'    0.14 m/s      TRICUSPID VALVE/RVSP:          Normal Ranges:  Peak TR Velocity:     2.72 m/s      PULMONIC VALVE:          Normal Ranges:  PV Max Reg:     0.8 m/s  (0.6-0.9m/s)  PV Max P.5 mmHg  PV Mean P.5 mmHg  PV VTI:         17.40 cm      [id number] [facility]  MD  Electronically signed on 11/12/2024 at 9:19:44 AM      Imaging  No results found.    Cardiology, Vascular, and Other Imaging  No other imaging results found for the past 30 days     Current Outpatient Medications   Medication Instructions    albuterol 90 mcg/actuation inhaler 2 puffs, Every 4 hours PRN    alendronate (FOSAMAX) 70 mg, Every 7 days    amLODIPine (NORVASC) 10 mg, Daily    carvedilol (Coreg) 12.5 mg tablet Take 1 tablet (12.5 mg) by mouth once daily.    gabapentin (NEURONTIN) 300 mg, oral, 3 times daily    hydrOXYzine HCL (ATARAX) 25 mg, Every 8 hours PRN    ibuprofen 800 mg tablet 1 tablet, Every 8 hours PRN    lidocaine 4 % patch 1 patch, transdermal, Daily, Remove & discard patch within 12 hours or as directed by MD.    naloxone (NARCAN) 4 mg, nasal, As needed, May repeat every 2-3 minutes if needed, alternating nostrils, until medical assistance becomes available.    omeprazole (PriLOSEC) 40 mg DR capsule TAKE 1 TABLET BY MOUTH 30-60 MINUTES BEFORE BREAKFAST.    rosuvastatin (CRESTOR) 10 mg, Daily    sertraline (ZOLOFT) 50 mg, Daily    Stiolto Respimat 2.5-2.5 mcg/actuation mist inhaler 2 Inhalations, Daily    triamcinolone (Kenalog) 0.5 % cream         Problems Addressed:   # Lung Adenocarcinoma, left upper lobe, biopsy-confirmed 3/31/2025:  # Risk for Cancer Therapy Related Cardiac Dysfunction (CTRCD), last EF:11/11/2024: 58%, Last GLS: No results found for requested labs within last 3650 days., Last hsTnI: 11/9/2024: 16, last BNP (No results found for requested labs within last 3650 days. Planned robotic wedge resection for peripheral 1.5 cm lesion. No evidence of yulia or distant metastases on PET  # Abnormal ECG, prior anteroseptal infarct pattern, not seen on repeat EKG today: no ischemic symptoms, echo with preserved LVEF and no RWMA. CT scan confirmed coronary artery calcification. Asymptomatic with good functional capacity. No further cardiac testing needed before lung resection.  Emphasized managing hypertension and hyperlipidemia.  - baseline cardiac biomarker/lipids  - continue BB.   - aggressive risk factor modification, referral to general cardiology for continuity of care.    # Cardiovascular risk factors:   # Coronary calcification: continue statin/BB. No chest pain. Preserved functional capacity.     ## Hypertension: Last BP: 117/77. on carvedilol and amlodipine, BP in normal range during admission  - continue current regimen    ## Hyperlipidemia: Last Tchol 6/18/2021: 203 c/w rosuvastatin    ## Tobacco Use Disorder: 30 pack-year history, Recently quit smoking. Emphasized abstinence to reduce pulmonary and cardiovascular risks.  - Refer to tobacco cessation counseling group for support and potential pharmacotherapy.    # History of Stroke (2018):  - no recurrent cerebrovascular events; no anticoagulation indicated      Patient Instructions   If you have any questions or need cardiac medication refills, call [phone] to reach the cardio-oncology office      VISIT SUMMARY:  You came in today for cardiology clearance before your upcoming lung resection surgery. We discussed your coronary artery disease, elevated cholesterol, and recent smoking cessation. You are currently asymptomatic and have good functional capacity.    YOUR PLAN:  -CORONARY ARTERY DISEASE: Coronary artery disease is a condition where the blood vessels supplying your heart are narrowed or blocked. Your CT scan showed calcification in your coronary arteries, but you are currently asymptomatic and have good functional capacity. No further cardiac testing is needed before your lung resection surgery. We will provide cardiac clearance for your surgery and refer you to a cardiology program in [place] for ongoing care.    -ELEVATED CHOLESTEROL: Elevated cholesterol means you have higher levels of cholesterol in your blood, which can increase your risk of heart disease. Your last cholesterol check in 2021 showed slightly  elevated levels. We will order a lipid profile to assess your current cholesterol levels and may consider increasing your rosuvastatin if your cholesterol remains elevated.    -TOBACCO USE: You have recently quit smoking, which is excellent for your lung and heart health. To help you maintain your non-smoking status, we will refer you to a tobacco cessation counseling group for support and potential medication.    INSTRUCTIONS:  Please follow up with the lipid profile test as ordered. Attend the tobacco cessation counseling group for additional support. We will provide cardiac clearance for your lung resection surgery and refer you to a cardiology program in [place] for ongoing care.    If I placed a referral today for a specialist/procedure, please call the general scheduling line at [phone]. You may also schedule appointments on the [employer] sara. For radiology scheduling (Cardiac calcium score, CTA with HeartFlow, Cardiac MRI, NM cardiac stress test, PET viability study) the phone number is [phone].        Orders:  No orders of the defined types were placed in this encounter.     Followup Appts:  Future Appointments   Date Time Provider Department [place]   5/21/2025  9:30 AM Bob Castillo MD [id number] [place]   6/11/2025  3:30 PM Josh Tuttle MD [id number] [place]       The encounter involved a comprehensive review of extensive data, including prior medical records, imaging studies, laboratory results, and consultations, followed by in-depth counseling regarding the patient's complex cardiac condition and comorbidities. We discussed treatment options, risks and benefits, and recommendations for ongoing care and careful monitoring of adverse effects from medications.     ____________________________________________________________  Bob Castillo MD  Section of Advanced Heart Failure and Cardiac Transplantation  Division of Cardiovascular Medicine  [facility]  [facility]         [1] No Known Allergies

## 2025-05-21 ENCOUNTER — OFFICE VISIT (OUTPATIENT)
Dept: CARDIOLOGY | Facility: CLINIC | Age: 70
End: 2025-05-21
Payer: MEDICARE

## 2025-05-21 VITALS
SYSTOLIC BLOOD PRESSURE: 117 MMHG | BODY MASS INDEX: 27.26 KG/M2 | OXYGEN SATURATION: 96 % | HEART RATE: 72 BPM | RESPIRATION RATE: 16 BRPM | WEIGHT: 158.8 LBS | DIASTOLIC BLOOD PRESSURE: 77 MMHG | TEMPERATURE: 97 F

## 2025-05-21 DIAGNOSIS — R06.9 UNSPECIFIED ABNORMALITIES OF BREATHING: ICD-10-CM

## 2025-05-21 DIAGNOSIS — F17.211 CIGARETTE NICOTINE DEPENDENCE IN REMISSION: ICD-10-CM

## 2025-05-21 DIAGNOSIS — I10 PRIMARY HYPERTENSION: Primary | ICD-10-CM

## 2025-05-21 DIAGNOSIS — E78.2 MIXED HYPERLIPIDEMIA: ICD-10-CM

## 2025-05-21 PROCEDURE — 99214 OFFICE O/P EST MOD 30 MIN: CPT | Performed by: STUDENT IN AN ORGANIZED HEALTH CARE EDUCATION/TRAINING PROGRAM

## 2025-05-21 PROCEDURE — 1126F AMNT PAIN NOTED NONE PRSNT: CPT | Performed by: STUDENT IN AN ORGANIZED HEALTH CARE EDUCATION/TRAINING PROGRAM

## 2025-05-21 PROCEDURE — 1159F MED LIST DOCD IN RCRD: CPT | Performed by: STUDENT IN AN ORGANIZED HEALTH CARE EDUCATION/TRAINING PROGRAM

## 2025-05-21 PROCEDURE — 3078F DIAST BP <80 MM HG: CPT | Performed by: STUDENT IN AN ORGANIZED HEALTH CARE EDUCATION/TRAINING PROGRAM

## 2025-05-21 PROCEDURE — 99204 OFFICE O/P NEW MOD 45 MIN: CPT | Performed by: STUDENT IN AN ORGANIZED HEALTH CARE EDUCATION/TRAINING PROGRAM

## 2025-05-21 PROCEDURE — 3074F SYST BP LT 130 MM HG: CPT | Performed by: STUDENT IN AN ORGANIZED HEALTH CARE EDUCATION/TRAINING PROGRAM

## 2025-05-21 ASSESSMENT — PATIENT HEALTH QUESTIONNAIRE - PHQ9
1. LITTLE INTEREST OR PLEASURE IN DOING THINGS: NOT AT ALL
2. FEELING DOWN, DEPRESSED OR HOPELESS: NOT AT ALL
SUM OF ALL RESPONSES TO PHQ9 QUESTIONS 1 & 2: 0

## 2025-05-21 ASSESSMENT — PAIN SCALES - GENERAL: PAINLEVEL_OUTOF10: 0-NO PAIN

## 2025-05-21 ASSESSMENT — ENCOUNTER SYMPTOMS
OCCASIONAL FEELINGS OF UNSTEADINESS: 0
DEPRESSION: 0
LOSS OF SENSATION IN FEET: 0

## 2025-05-21 NOTE — PROGRESS NOTES
[facility] [address]      Cardiac Risk Summary:    [x] Based on the most recent clinical assessment, Tuyet Ramirez does **not** exhibit active signs or objective evidence of:  Acute Coronary Syndrome    Decompensated Heart Failure    Significant Arrhythmias    Severe Valvular Heart Disease      Tuyet Ramirez is able to perform >4 METs (metabolic equivalents).      Revised Cardiac Risk Index (RCRI) Score:   [x] High risk surgery (+1) - Intraperitoneal; intrathoracic; suprainguinal vascular, prolonged surgery with large fluid shifts   [] History of ischemic heart disease (+1) - History of myocardial infarction (MI); history of positive exercise test; current chest pain considered due to myocardial ischemia; use of nitrate therapy or ECG with pathological Q waves   [] History of congestive heart failure (+1) - Pulmonary edema, bilateral rales, or S3 gallop; paroxysmal nocturnal dyspnea; chest x-ray (CXR) showing pulmonary vascular redistribution, Last BNP: No results found for requested labs within last 3650 days.  [] History of cerebrovascular disease (+1) - Prior transient ischemic attack (TIA) or stroke  [] Pre-operative treatment with insulin (+1) - Last A1c: No results found for requested labs within last 3650 days.  [] Pre-operative creatinine >2 mg/dL (+1) - 11/12/2024: 0.70      RCRI Score Risk of major cardiac event (95% CI)   0 3.9% (2.8-5.4%)   1 6.0% (4.9-7.4%)   2 10.1% (8.1-12.6%)   >=3 15% (11.1-20.0%)   RCRI >1 is denoting elevated risk.    ACC/AHA Surgical Risk Classification:  [] Low - breast surgery, superficial procedure, endoscopy  [] Intermediate - CEA, head and neck surgery, intra-peritoneal or intra thoracic surgery, orthopedic or prostate surgery  [x] High - major vascular surgery, prolonged surgery with large fluid shifts    Cardiac Risk  [x] Patient is at **LOW** cardiac risk for surgery  [] Patient is at **INCREASED** cardiac risk but optimized as  possible for surgery  [] Patient is **NOT optimized** - defer surgery pending further testing (see below):  [] Echocardiogram    [] Stress Test    [] Coronary CTA  [] Cardiac Cath  [] Other:     ------------------------------------------------------------------------------------------------------------------------------------------------  Medication Guidance:   (Primary Source: 2024 AHA/ACC/ACS/ASNC/HRS/SCA/SCCT/SCMR/SVM Guideline for Perioperative Cardiovascular Management for Noncardiac Surgery)    Current Outpatient Medications   Medication Instructions    albuterol 90 mcg/actuation inhaler 2 puffs, Every 4 hours PRN    alendronate (FOSAMAX) 70 mg, Every 7 days    amLODIPine (NORVASC) 10 mg, Daily    carvedilol (Coreg) 12.5 mg tablet Take 1 tablet (12.5 mg) by mouth once daily.    gabapentin (NEURONTIN) 300 mg, oral, 3 times daily    hydrOXYzine HCL (ATARAX) 25 mg, Every 8 hours PRN    ibuprofen 800 mg tablet 1 tablet, Every 8 hours PRN    lidocaine 4 % patch 1 patch, transdermal, Daily, Remove & discard patch within 12 hours or as directed by MD.    naloxone (NARCAN) 4 mg, nasal, As needed, May repeat every 2-3 minutes if needed, alternating nostrils, until medical assistance becomes available.    omeprazole (PriLOSEC) 40 mg DR capsule TAKE 1 TABLET BY MOUTH 30-60 MINUTES BEFORE BREAKFAST.    rosuvastatin (CRESTOR) 10 mg, Daily    sertraline (ZOLOFT) 50 mg, Daily    Stiolto Respimat 2.5-2.5 mcg/actuation mist inhaler 2 Inhalations, Daily    triamcinolone (Kenalog) 0.5 % cream      Cardiovascular Protective Medications:   - Beta Blocker: Continue uninterrupted perioperatively.    - Statin: Continue uninterrupted perioperatively.    - SGLT2 inhibitors (e.g., canagliflozin, dapagliflozin, empagliflozin): HOLD 4 days pre-op, restart post-op when eating oral diet.     Anti Thrombotic Medication Management: N/A    Anticoagulation (OAC/DOACs): N/A    The above are best practice recommendations subject to your  discretion as the treating physician.     Please note: PAT team should provide medication holding instructions and the surgical team will provide medication resuming instructions based on the above recommendations.      __________________________________________________  Bob Castillo MD  [facility] [place]    5/21/2025

## 2025-05-21 NOTE — PATIENT INSTRUCTIONS
If you have any questions or need cardiac medication refills, call [phone] to reach the cardio-oncology office    Get some labwork for your long term cardiovascular risk.   Tobacco cessation counseling referral  Followup with General Cardiology in [place]  Proceed with scheduled surgery as planned

## 2025-05-21 NOTE — PREPROCEDURE INSTRUCTIONS
Current Medications    Medication Instructions    albuterol 90 mcg/actuation inhaler Use morning of surgery if needed. Bring to hospital.    alendronate (Fosamax) 70 mg tablet Take as directed     amLODIPine (Norvasc) 10 mg tablet Take morning of surgery with sip of water    carvedilol (Coreg) 12.5 mg tablet Take morning of surgery with sip of water    gabapentin (Neurontin) 300 mg capsule Take morning of surgery with sip of water    hydrOXYzine HCL (Atarax) 25 mg tablet Do not take morning of surgery     omeprazole (PriLOSEC) 40 mg DR capsule Take morning of surgery with sip of water    rosuvastatin (Crestor) 10 mg tablet Take morning of surgery with sip of water    sertraline (Zoloft) 50 mg tablet Take morning of surgery with sip of water    Stiolto Respimat 2.5-2.5 mcg/actuation mist inhaler Use morning of surgery     triamcinolone (Kenalog) 0.5 % cream Continue until night before surgery           NPO Instructions:  Do not eat any food after midnight the night before your surgery.    Additional Instructions:     Six Days before Surgery (May 21):  [facility] patients, check your MEDLINE Kit/instructions. Start Ensure protein drinks. See instructions below.    Friday May 23:  You will be contacted by Surgery Scheduling regarding the time of your arrival to facility and surgery time. If you have not received a call by 2:00 pm, please call [phone]    Day of Surgery:  Review your medication instructions, take indicated medications  [facility] patients, drink your Carbohydrate drink TWO hours before ARRIVAL to hospital    Enter through the main entrance of [facility], located at [address] Proceed to registration, located on the right hand side of the staircase. You will need your ID and insurance card for registration. Please ensure you have a responsible adult to drive you home. A responsible adult DOES NOT include rideshare service drivers (Uber, Lyft, etc), cab drivers, or public transportation  drivers, but “provide a ride” is acceptable.    Take a shower before your procedure. After your shower avoid lotions, powders, deodorants or anything applied to the skin. If you wear contacts or glasses, wear the glasses. If you do not have glasses, please bring a case for your contacts. You may wear hearing aids and dentures, bring a case for them or we will provide one. Make sure you wear something loose and comfortable. Keep in mind your surgical procedure and wear something that will accommodate incisions or bandages. Please remove all jewelry and piercing's.     For further questions Moreno RUDOLPH can be contacted at [phone] between 7AM-3PM.                                                                                                                                       How to Use Ensure Drinks    A protein-rich drink and a carbohydrate-rich drink can help your body heal better and lower your chances of problems after surgery, such as infection.   If you have diabetes, these instructions are not for you. Ask your surgeon which instructions you should use.  What to drink How to use it   6 days before surgery  Ensure Plus   Drink 1 carton 2 times a day for 5 days. Start 6 days before your surgery. Use the chart below to keep track.    Check boxes to keep track of servings   Days before surgery 6    5/21 5    5/22 4    5/23 3    5/24 2    5/25              1 day before surgery and the day of surgery     Drink each 8 oz within 5 to 10 minutes.     day before surgery - at dinner time    1 day before surgery - at bedtime     hours before your ARRIVAL time to hospital

## 2025-05-22 ENCOUNTER — APPOINTMENT (OUTPATIENT)
Dept: LAB | Facility: HOSPITAL | Age: 70
End: 2025-05-22
Payer: MEDICARE

## 2025-05-22 ENCOUNTER — LAB (OUTPATIENT)
Dept: LAB | Facility: HOSPITAL | Age: 70
End: 2025-05-22
Payer: MEDICARE

## 2025-05-22 DIAGNOSIS — I10 ESSENTIAL (PRIMARY) HYPERTENSION: Primary | ICD-10-CM

## 2025-05-22 LAB — BNP SERPL-MCNC: 38 PG/ML (ref 0–99)

## 2025-05-22 PROCEDURE — 36415 COLL VENOUS BLD VENIPUNCTURE: CPT

## 2025-05-22 PROCEDURE — 83880 ASSAY OF NATRIURETIC PEPTIDE: CPT

## 2025-05-23 ENCOUNTER — TELEPHONE (OUTPATIENT)
Dept: CARDIOLOGY | Facility: HOSPITAL | Age: 70
End: 2025-05-23
Payer: MEDICARE

## 2025-05-25 ENCOUNTER — ANESTHESIA EVENT (OUTPATIENT)
Dept: OPERATING ROOM | Facility: HOSPITAL | Age: 70
End: 2025-05-25
Payer: MEDICARE

## 2025-05-25 PROBLEM — F17.200 CURRENT SMOKER: Status: ACTIVE | Noted: 2025-05-25

## 2025-05-25 PROBLEM — Z86.59 HISTORY OF DEPRESSION: Status: ACTIVE | Noted: 2025-05-25

## 2025-05-25 PROBLEM — Z85.038 HISTORY OF MALIGNANT NEOPLASM OF COLON: Status: ACTIVE | Noted: 2025-05-25

## 2025-05-25 SDOH — HEALTH STABILITY: MENTAL HEALTH: CURRENT SMOKER: 1

## 2025-05-25 NOTE — ANESTHESIA PREPROCEDURE EVALUATION
Patient: Tuyet Ramirez    Procedure Information       Date/Time: 05/27/25 9883    Procedure: ROBOTIC ASSISTED LEFT LUNG WEDGE RESECTION/ POSSIBLE THORACOTOMY (Left: Chest) - Robot-Assisted Lung Wedge Resection, possible thoracotomy    Location: [facility] / [facility]    Surgeons: Josh Tuttle MD            Relevant Problems   Cardiac   (+) Hyperlipidemia   (+) Hypertension      Pulmonary   (+) COPD (chronic obstructive pulmonary disease) (Multi)   (+) Primary cancer of left upper lobe of lung (Multi)      Neuro   (+) Anxiety   (+) Bipolar disorder, unspecified (Multi)   (+) Depression      GI   (+) Adenocarcinoma, colon   (+) Dysphagia   (+) Esophageal ulcer   (+) Hiatal hernia with gastroesophageal reflux      /Renal   (+) Hyponatremia      Liver   (+) Adenocarcinoma, colon      Musculoskeletal   (+) Osteoarthritis      Skin   (+) Eczema       Clinical information reviewed:    Allergies   Problems              NPO Detail:  No data recorded     Physical Exam    Airway  Mallampati: IV  TM distance: <3 FB  Neck ROM: full  Mouth opening: 3 or more finger widths     Cardiovascular - normal exam  Rhythm: regular  Rate: normal     Dental - normal exam     Pulmonary Breath sounds clear to auscultation  (+) rhonchi     Abdominal      Other findings: Preop Duoneb tx.        Anesthesia Plan    History of general anesthesia?: yes  History of complications of general anesthesia?: no    ASA 3     general   (WADE AND A-LINE PLACEMENT)  The patient is a current smoker.  Patient was previously instructed to abstain from smoking on day of procedure.  Patient did not smoke on day of procedure.    intravenous induction   Postoperative pain plan includes opioids.  Trial extubation is planned.  Anesthetic plan and risks discussed with patient.  Use of blood products discussed with patient who.    Plan discussed with CAA.

## 2025-05-27 ENCOUNTER — HOSPITAL ENCOUNTER (INPATIENT)
Facility: HOSPITAL | Age: 70
LOS: 2 days | Discharge: HOME HEALTH | End: 2025-05-29
Attending: THORACIC SURGERY (CARDIOTHORACIC VASCULAR SURGERY) | Admitting: THORACIC SURGERY (CARDIOTHORACIC VASCULAR SURGERY)
Payer: MEDICARE

## 2025-05-27 ENCOUNTER — ANESTHESIA (OUTPATIENT)
Dept: OPERATING ROOM | Facility: HOSPITAL | Age: 70
End: 2025-05-27
Payer: MEDICARE

## 2025-05-27 ENCOUNTER — APPOINTMENT (OUTPATIENT)
Dept: RADIOLOGY | Facility: HOSPITAL | Age: 70
End: 2025-05-27
Payer: MEDICARE

## 2025-05-27 ENCOUNTER — APPOINTMENT (OUTPATIENT)
Dept: CARDIOLOGY | Facility: HOSPITAL | Age: 70
End: 2025-05-27
Payer: MEDICARE

## 2025-05-27 DIAGNOSIS — C34.12 PRIMARY CANCER OF LEFT UPPER LOBE OF LUNG (MULTI): Primary | ICD-10-CM

## 2025-05-27 DIAGNOSIS — J44.9 CHRONIC OBSTRUCTIVE PULMONARY DISEASE, UNSPECIFIED COPD TYPE (MULTI): ICD-10-CM

## 2025-05-27 DIAGNOSIS — I61.9 STROKE, HEMORRHAGIC (MULTI): ICD-10-CM

## 2025-05-27 LAB
ANION GAP SERPL CALC-SCNC: 12 MMOL/L (ref 10–20)
BUN SERPL-MCNC: 13 MG/DL (ref 6–23)
CALCIUM SERPL-MCNC: 9.1 MG/DL (ref 8.6–10.3)
CHLORIDE SERPL-SCNC: 103 MMOL/L (ref 98–107)
CO2 SERPL-SCNC: 27 MMOL/L (ref 21–32)
CREAT SERPL-MCNC: 0.73 MG/DL (ref 0.5–1.05)
EGFRCR SERPLBLD CKD-EPI 2021: 90 ML/MIN/1.73M*2
GLUCOSE SERPL-MCNC: 109 MG/DL (ref 74–99)
POTASSIUM SERPL-SCNC: 4.1 MMOL/L (ref 3.5–5.3)
SODIUM SERPL-SCNC: 138 MMOL/L (ref 136–145)

## 2025-05-27 PROCEDURE — 2500000005 HC RX 250 GENERAL PHARMACY W/O HCPCS

## 2025-05-27 PROCEDURE — 2500000004 HC RX 250 GENERAL PHARMACY W/ HCPCS (ALT 636 FOR OP/ED): Mod: JZ | Performed by: ANESTHESIOLOGY

## 2025-05-27 PROCEDURE — 2500000004 HC RX 250 GENERAL PHARMACY W/ HCPCS (ALT 636 FOR OP/ED): Mod: JZ | Performed by: THORACIC SURGERY (CARDIOTHORACIC VASCULAR SURGERY)

## 2025-05-27 PROCEDURE — 3700000001 HC GENERAL ANESTHESIA TIME - INITIAL BASE CHARGE: Performed by: THORACIC SURGERY (CARDIOTHORACIC VASCULAR SURGERY)

## 2025-05-27 PROCEDURE — 0BBG4ZZ EXCISION OF LEFT UPPER LUNG LOBE, PERCUTANEOUS ENDOSCOPIC APPROACH: ICD-10-PCS | Performed by: THORACIC SURGERY (CARDIOTHORACIC VASCULAR SURGERY)

## 2025-05-27 PROCEDURE — A32666 PR THORACOSCOPY W/THERA WEDGE RESEXN INITIAL UNILAT: Performed by: ANESTHESIOLOGY

## 2025-05-27 PROCEDURE — 94640 AIRWAY INHALATION TREATMENT: CPT

## 2025-05-27 PROCEDURE — 2020000001 HC ICU ROOM DAILY

## 2025-05-27 PROCEDURE — C1751 CATH, INF, PER/CENT/MIDLINE: HCPCS | Performed by: THORACIC SURGERY (CARDIOTHORACIC VASCULAR SURGERY)

## 2025-05-27 PROCEDURE — 99291 CRITICAL CARE FIRST HOUR: CPT

## 2025-05-27 PROCEDURE — 2500000004 HC RX 250 GENERAL PHARMACY W/ HCPCS (ALT 636 FOR OP/ED): Performed by: ANESTHESIOLOGIST ASSISTANT

## 2025-05-27 PROCEDURE — A32666 PR THORACOSCOPY W/THERA WEDGE RESEXN INITIAL UNILAT: Performed by: ANESTHESIOLOGIST ASSISTANT

## 2025-05-27 PROCEDURE — 31622 DX BRONCHOSCOPE/WASH: CPT | Performed by: THORACIC SURGERY (CARDIOTHORACIC VASCULAR SURGERY)

## 2025-05-27 PROCEDURE — 8E0W4CZ ROBOTIC ASSISTED PROCEDURE OF TRUNK REGION, PERCUTANEOUS ENDOSCOPIC APPROACH: ICD-10-PCS | Performed by: THORACIC SURGERY (CARDIOTHORACIC VASCULAR SURGERY)

## 2025-05-27 PROCEDURE — 7100000001 HC RECOVERY ROOM TIME - INITIAL BASE CHARGE: Performed by: THORACIC SURGERY (CARDIOTHORACIC VASCULAR SURGERY)

## 2025-05-27 PROCEDURE — 7100000002 HC RECOVERY ROOM TIME - EACH INCREMENTAL 1 MINUTE: Performed by: THORACIC SURGERY (CARDIOTHORACIC VASCULAR SURGERY)

## 2025-05-27 PROCEDURE — 2500000002 HC RX 250 W HCPCS SELF ADMINISTERED DRUGS (ALT 637 FOR MEDICARE OP, ALT 636 FOR OP/ED)

## 2025-05-27 PROCEDURE — 2780000003 HC OR 278 NO HCPCS: Performed by: THORACIC SURGERY (CARDIOTHORACIC VASCULAR SURGERY)

## 2025-05-27 PROCEDURE — 2500000004 HC RX 250 GENERAL PHARMACY W/ HCPCS (ALT 636 FOR OP/ED): Mod: JZ

## 2025-05-27 PROCEDURE — 2500000001 HC RX 250 WO HCPCS SELF ADMINISTERED DRUGS (ALT 637 FOR MEDICARE OP)

## 2025-05-27 PROCEDURE — 3600000018 HC OR TIME - INITIAL BASE CHARGE - PROCEDURE LEVEL SIX: Performed by: THORACIC SURGERY (CARDIOTHORACIC VASCULAR SURGERY)

## 2025-05-27 PROCEDURE — 3700000002 HC GENERAL ANESTHESIA TIME - EACH INCREMENTAL 1 MINUTE: Performed by: THORACIC SURGERY (CARDIOTHORACIC VASCULAR SURGERY)

## 2025-05-27 PROCEDURE — 07T74ZZ RESECTION OF THORAX LYMPHATIC, PERCUTANEOUS ENDOSCOPIC APPROACH: ICD-10-PCS | Performed by: THORACIC SURGERY (CARDIOTHORACIC VASCULAR SURGERY)

## 2025-05-27 PROCEDURE — 2500000002 HC RX 250 W HCPCS SELF ADMINISTERED DRUGS (ALT 637 FOR MEDICARE OP, ALT 636 FOR OP/ED): Mod: JZ | Performed by: ANESTHESIOLOGY

## 2025-05-27 PROCEDURE — 3600000017 HC OR TIME - EACH INCREMENTAL 1 MINUTE - PROCEDURE LEVEL SIX: Performed by: THORACIC SURGERY (CARDIOTHORACIC VASCULAR SURGERY)

## 2025-05-27 PROCEDURE — 93005 ELECTROCARDIOGRAM TRACING: CPT

## 2025-05-27 PROCEDURE — 36415 COLL VENOUS BLD VENIPUNCTURE: CPT | Performed by: ANESTHESIOLOGY

## 2025-05-27 PROCEDURE — 2500000004 HC RX 250 GENERAL PHARMACY W/ HCPCS (ALT 636 FOR OP/ED): Performed by: THORACIC SURGERY (CARDIOTHORACIC VASCULAR SURGERY)

## 2025-05-27 PROCEDURE — 2720000007 HC OR 272 NO HCPCS: Performed by: THORACIC SURGERY (CARDIOTHORACIC VASCULAR SURGERY)

## 2025-05-27 PROCEDURE — 32666 THORACOSCOPY W/WEDGE RESECT: CPT | Performed by: THORACIC SURGERY (CARDIOTHORACIC VASCULAR SURGERY)

## 2025-05-27 PROCEDURE — 80048 BASIC METABOLIC PNL TOTAL CA: CPT | Performed by: ANESTHESIOLOGY

## 2025-05-27 PROCEDURE — 2500000001 HC RX 250 WO HCPCS SELF ADMINISTERED DRUGS (ALT 637 FOR MEDICARE OP): Performed by: THORACIC SURGERY (CARDIOTHORACIC VASCULAR SURGERY)

## 2025-05-27 PROCEDURE — 71045 X-RAY EXAM CHEST 1 VIEW: CPT | Performed by: RADIOLOGY

## 2025-05-27 PROCEDURE — 71045 X-RAY EXAM CHEST 1 VIEW: CPT

## 2025-05-27 PROCEDURE — 99222 1ST HOSP IP/OBS MODERATE 55: CPT

## 2025-05-27 PROCEDURE — 32674 THORACOSCOPY LYMPH NODE EXC: CPT | Performed by: THORACIC SURGERY (CARDIOTHORACIC VASCULAR SURGERY)

## 2025-05-27 PROCEDURE — 36620 INSERTION CATHETER ARTERY: CPT | Performed by: ANESTHESIOLOGY

## 2025-05-27 RX ORDER — FORMOTEROL FUMARATE 20 UG/2ML
20 SOLUTION RESPIRATORY (INHALATION)
Status: DISCONTINUED | OUTPATIENT
Start: 2025-05-27 | End: 2025-05-29 | Stop reason: HOSPADM

## 2025-05-27 RX ORDER — AMLODIPINE BESYLATE 10 MG/1
10 TABLET ORAL DAILY
Status: DISCONTINUED | OUTPATIENT
Start: 2025-05-28 | End: 2025-05-29 | Stop reason: HOSPADM

## 2025-05-27 RX ORDER — ONDANSETRON HYDROCHLORIDE 2 MG/ML
INJECTION, SOLUTION INTRAVENOUS AS NEEDED
Status: DISCONTINUED | OUTPATIENT
Start: 2025-05-27 | End: 2025-05-27

## 2025-05-27 RX ORDER — GLYCOPYRROLATE 0.2 MG/ML
INJECTION INTRAMUSCULAR; INTRAVENOUS AS NEEDED
Status: DISCONTINUED | OUTPATIENT
Start: 2025-05-27 | End: 2025-05-27

## 2025-05-27 RX ORDER — BISACODYL 5 MG
10 TABLET, DELAYED RELEASE (ENTERIC COATED) ORAL DAILY PRN
Status: DISCONTINUED | OUTPATIENT
Start: 2025-05-27 | End: 2025-05-29 | Stop reason: HOSPADM

## 2025-05-27 RX ORDER — ACETAMINOPHEN 325 MG/1
650 TABLET ORAL EVERY 4 HOURS PRN
Status: DISCONTINUED | OUTPATIENT
Start: 2025-05-27 | End: 2025-05-27 | Stop reason: HOSPADM

## 2025-05-27 RX ORDER — ALBUTEROL SULFATE 90 UG/1
2 INHALANT RESPIRATORY (INHALATION) EVERY 4 HOURS PRN
Status: DISCONTINUED | OUTPATIENT
Start: 2025-05-27 | End: 2025-05-27

## 2025-05-27 RX ORDER — MIDAZOLAM HYDROCHLORIDE 1 MG/ML
INJECTION, SOLUTION INTRAMUSCULAR; INTRAVENOUS AS NEEDED
Status: DISCONTINUED | OUTPATIENT
Start: 2025-05-27 | End: 2025-05-27

## 2025-05-27 RX ORDER — PANTOPRAZOLE SODIUM 40 MG/1
40 TABLET, DELAYED RELEASE ORAL
Status: DISCONTINUED | OUTPATIENT
Start: 2025-05-28 | End: 2025-05-29 | Stop reason: HOSPADM

## 2025-05-27 RX ORDER — ACETAMINOPHEN 325 MG/1
650 TABLET ORAL ONCE
Status: COMPLETED | OUTPATIENT
Start: 2025-05-27 | End: 2025-05-27

## 2025-05-27 RX ORDER — LIDOCAINE 560 MG/1
1 PATCH PERCUTANEOUS; TOPICAL; TRANSDERMAL DAILY
Status: DISCONTINUED | OUTPATIENT
Start: 2025-05-27 | End: 2025-05-29 | Stop reason: HOSPADM

## 2025-05-27 RX ORDER — ONDANSETRON HYDROCHLORIDE 2 MG/ML
4 INJECTION, SOLUTION INTRAVENOUS EVERY 8 HOURS PRN
Status: DISCONTINUED | OUTPATIENT
Start: 2025-05-27 | End: 2025-05-29 | Stop reason: HOSPADM

## 2025-05-27 RX ORDER — FENTANYL CITRATE 50 UG/ML
25 INJECTION, SOLUTION INTRAMUSCULAR; INTRAVENOUS
Status: DISCONTINUED | OUTPATIENT
Start: 2025-05-27 | End: 2025-05-28

## 2025-05-27 RX ORDER — SERTRALINE HYDROCHLORIDE 50 MG/1
50 TABLET, FILM COATED ORAL DAILY
Status: DISCONTINUED | OUTPATIENT
Start: 2025-05-28 | End: 2025-05-29 | Stop reason: HOSPADM

## 2025-05-27 RX ORDER — ALBUTEROL SULFATE 0.83 MG/ML
2.5 SOLUTION RESPIRATORY (INHALATION) ONCE AS NEEDED
Status: DISCONTINUED | OUTPATIENT
Start: 2025-05-27 | End: 2025-05-27 | Stop reason: HOSPADM

## 2025-05-27 RX ORDER — ALBUTEROL SULFATE 90 UG/1
2 INHALANT RESPIRATORY (INHALATION) EVERY 2 HOUR PRN
Status: DISCONTINUED | OUTPATIENT
Start: 2025-05-27 | End: 2025-05-29 | Stop reason: HOSPADM

## 2025-05-27 RX ORDER — METOPROLOL TARTRATE 1 MG/ML
5 INJECTION, SOLUTION INTRAVENOUS
Status: DISCONTINUED | OUTPATIENT
Start: 2025-05-27 | End: 2025-05-28

## 2025-05-27 RX ORDER — PROCHLORPERAZINE EDISYLATE 5 MG/ML
5 INJECTION INTRAMUSCULAR; INTRAVENOUS ONCE AS NEEDED
Status: DISCONTINUED | OUTPATIENT
Start: 2025-05-27 | End: 2025-05-27 | Stop reason: HOSPADM

## 2025-05-27 RX ORDER — BUPIVACAINE HYDROCHLORIDE 2.5 MG/ML
INJECTION, SOLUTION INFILTRATION; PERINEURAL AS NEEDED
Status: DISCONTINUED | OUTPATIENT
Start: 2025-05-27 | End: 2025-05-27 | Stop reason: HOSPADM

## 2025-05-27 RX ORDER — OXYCODONE HYDROCHLORIDE 5 MG/1
5 TABLET ORAL EVERY 6 HOURS PRN
Status: DISCONTINUED | OUTPATIENT
Start: 2025-05-27 | End: 2025-05-29 | Stop reason: HOSPADM

## 2025-05-27 RX ORDER — POLYETHYLENE GLYCOL 3350 17 G/17G
17 POWDER, FOR SOLUTION ORAL DAILY
Status: DISCONTINUED | OUTPATIENT
Start: 2025-05-27 | End: 2025-05-29 | Stop reason: HOSPADM

## 2025-05-27 RX ORDER — ONDANSETRON HYDROCHLORIDE 2 MG/ML
4 INJECTION, SOLUTION INTRAVENOUS ONCE AS NEEDED
Status: DISCONTINUED | OUTPATIENT
Start: 2025-05-27 | End: 2025-05-27 | Stop reason: HOSPADM

## 2025-05-27 RX ORDER — GABAPENTIN 300 MG/1
300 CAPSULE ORAL ONCE
Status: DISCONTINUED | OUTPATIENT
Start: 2025-05-27 | End: 2025-05-27 | Stop reason: HOSPADM

## 2025-05-27 RX ORDER — LANOLIN ALCOHOL/MO/W.PET/CERES
400 CREAM (GRAM) TOPICAL DAILY
Status: DISCONTINUED | OUTPATIENT
Start: 2025-05-27 | End: 2025-05-29 | Stop reason: HOSPADM

## 2025-05-27 RX ORDER — METHOCARBAMOL 500 MG/1
500 TABLET, FILM COATED ORAL EVERY 8 HOURS SCHEDULED
Status: DISCONTINUED | OUTPATIENT
Start: 2025-05-27 | End: 2025-05-29 | Stop reason: HOSPADM

## 2025-05-27 RX ORDER — ROCURONIUM BROMIDE 10 MG/ML
INJECTION, SOLUTION INTRAVENOUS AS NEEDED
Status: DISCONTINUED | OUTPATIENT
Start: 2025-05-27 | End: 2025-05-27

## 2025-05-27 RX ORDER — PHENYLEPHRINE HCL IN 0.9% NACL 1 MG/10 ML
SYRINGE (ML) INTRAVENOUS AS NEEDED
Status: DISCONTINUED | OUTPATIENT
Start: 2025-05-27 | End: 2025-05-27

## 2025-05-27 RX ORDER — BISACODYL 10 MG/1
10 SUPPOSITORY RECTAL DAILY PRN
Status: DISCONTINUED | OUTPATIENT
Start: 2025-05-27 | End: 2025-05-29 | Stop reason: HOSPADM

## 2025-05-27 RX ORDER — LIDOCAINE HYDROCHLORIDE 10 MG/ML
0.1 INJECTION, SOLUTION INFILTRATION; PERINEURAL ONCE
Status: DISCONTINUED | OUTPATIENT
Start: 2025-05-27 | End: 2025-05-27 | Stop reason: HOSPADM

## 2025-05-27 RX ORDER — OXYCODONE HYDROCHLORIDE 5 MG/1
10 TABLET ORAL EVERY 6 HOURS PRN
Status: DISCONTINUED | OUTPATIENT
Start: 2025-05-27 | End: 2025-05-29 | Stop reason: HOSPADM

## 2025-05-27 RX ORDER — NALOXONE HYDROCHLORIDE 1 MG/ML
0.2 INJECTION INTRAMUSCULAR; INTRAVENOUS; SUBCUTANEOUS EVERY 5 MIN PRN
Status: DISCONTINUED | OUTPATIENT
Start: 2025-05-27 | End: 2025-05-29 | Stop reason: HOSPADM

## 2025-05-27 RX ORDER — SODIUM CHLORIDE, SODIUM LACTATE, POTASSIUM CHLORIDE, CALCIUM CHLORIDE 600; 310; 30; 20 MG/100ML; MG/100ML; MG/100ML; MG/100ML
100 INJECTION, SOLUTION INTRAVENOUS CONTINUOUS
Status: DISCONTINUED | OUTPATIENT
Start: 2025-05-27 | End: 2025-05-27

## 2025-05-27 RX ORDER — AMOXICILLIN 250 MG
2 CAPSULE ORAL 2 TIMES DAILY
Status: DISCONTINUED | OUTPATIENT
Start: 2025-05-27 | End: 2025-05-29 | Stop reason: HOSPADM

## 2025-05-27 RX ORDER — HEPARIN SODIUM 5000 [USP'U]/ML
5000 INJECTION, SOLUTION INTRAVENOUS; SUBCUTANEOUS ONCE
Status: COMPLETED | OUTPATIENT
Start: 2025-05-27 | End: 2025-05-27

## 2025-05-27 RX ORDER — ROSUVASTATIN CALCIUM 10 MG/1
10 TABLET, COATED ORAL DAILY
Status: DISCONTINUED | OUTPATIENT
Start: 2025-05-28 | End: 2025-05-29 | Stop reason: HOSPADM

## 2025-05-27 RX ORDER — GUAIFENESIN 600 MG/1
600 TABLET, EXTENDED RELEASE ORAL 2 TIMES DAILY PRN
Status: DISCONTINUED | OUTPATIENT
Start: 2025-05-27 | End: 2025-05-29 | Stop reason: HOSPADM

## 2025-05-27 RX ORDER — HYDROXYZINE HYDROCHLORIDE 25 MG/1
25 TABLET, FILM COATED ORAL EVERY 8 HOURS PRN
Status: DISCONTINUED | OUTPATIENT
Start: 2025-05-27 | End: 2025-05-29 | Stop reason: HOSPADM

## 2025-05-27 RX ORDER — IPRATROPIUM BROMIDE 0.5 MG/2.5ML
500 SOLUTION RESPIRATORY (INHALATION) ONCE
Status: DISCONTINUED | OUTPATIENT
Start: 2025-05-27 | End: 2025-05-27 | Stop reason: HOSPADM

## 2025-05-27 RX ORDER — ONDANSETRON 4 MG/1
4 TABLET, FILM COATED ORAL EVERY 8 HOURS PRN
Status: DISCONTINUED | OUTPATIENT
Start: 2025-05-27 | End: 2025-05-29 | Stop reason: HOSPADM

## 2025-05-27 RX ORDER — FENTANYL CITRATE 50 UG/ML
INJECTION, SOLUTION INTRAMUSCULAR; INTRAVENOUS AS NEEDED
Status: DISCONTINUED | OUTPATIENT
Start: 2025-05-27 | End: 2025-05-27

## 2025-05-27 RX ORDER — HEPARIN SODIUM 5000 [USP'U]/ML
5000 INJECTION, SOLUTION INTRAVENOUS; SUBCUTANEOUS EVERY 8 HOURS
Status: DISCONTINUED | OUTPATIENT
Start: 2025-05-27 | End: 2025-05-29 | Stop reason: HOSPADM

## 2025-05-27 RX ORDER — IPRATROPIUM BROMIDE AND ALBUTEROL SULFATE 2.5; .5 MG/3ML; MG/3ML
3 SOLUTION RESPIRATORY (INHALATION)
Status: DISCONTINUED | OUTPATIENT
Start: 2025-05-27 | End: 2025-05-27 | Stop reason: HOSPADM

## 2025-05-27 RX ORDER — CEFAZOLIN SODIUM 2 G/50ML
2 SOLUTION INTRAVENOUS ONCE
Status: COMPLETED | OUTPATIENT
Start: 2025-05-27 | End: 2025-05-27

## 2025-05-27 RX ORDER — PROPOFOL 10 MG/ML
INJECTION, EMULSION INTRAVENOUS AS NEEDED
Status: DISCONTINUED | OUTPATIENT
Start: 2025-05-27 | End: 2025-05-27

## 2025-05-27 RX ORDER — LIDOCAINE HYDROCHLORIDE 20 MG/ML
INJECTION, SOLUTION INFILTRATION; PERINEURAL AS NEEDED
Status: DISCONTINUED | OUTPATIENT
Start: 2025-05-27 | End: 2025-05-27

## 2025-05-27 RX ADMIN — METOPROLOL TARTRATE 5 MG: 5 INJECTION INTRAVENOUS at 16:13

## 2025-05-27 RX ADMIN — Medication 100 MCG: at 09:20

## 2025-05-27 RX ADMIN — SENNOSIDES AND DOCUSATE SODIUM 2 TABLET: 50; 8.6 TABLET ORAL at 21:39

## 2025-05-27 RX ADMIN — SODIUM CHLORIDE, POTASSIUM CHLORIDE, SODIUM LACTATE AND CALCIUM CHLORIDE: 600; 310; 30; 20 INJECTION, SOLUTION INTRAVENOUS at 07:44

## 2025-05-27 RX ADMIN — OXYCODONE HYDROCHLORIDE 10 MG: 5 TABLET ORAL at 12:23

## 2025-05-27 RX ADMIN — MAGNESIUM OXIDE TAB 400 MG (241.3 MG ELEMENTAL MG) 1 TABLET: 400 (241.3 MG) TAB at 14:02

## 2025-05-27 RX ADMIN — HYDROMORPHONE HYDROCHLORIDE 0.4 MG: 1 INJECTION, SOLUTION INTRAMUSCULAR; INTRAVENOUS; SUBCUTANEOUS at 14:00

## 2025-05-27 RX ADMIN — DEXAMETHASONE SODIUM PHOSPHATE 8 MG: 4 INJECTION, SOLUTION INTRAMUSCULAR; INTRAVENOUS at 09:00

## 2025-05-27 RX ADMIN — FORMOTEROL FUMARATE DIHYDRATE 20 MCG: 20 SOLUTION RESPIRATORY (INHALATION) at 18:22

## 2025-05-27 RX ADMIN — METOPROLOL TARTRATE 5 MG: 5 INJECTION INTRAVENOUS at 12:08

## 2025-05-27 RX ADMIN — Medication 100 MCG: at 09:10

## 2025-05-27 RX ADMIN — ROCURONIUM BROMIDE 30 MG: 50 INJECTION, SOLUTION INTRAVENOUS at 08:57

## 2025-05-27 RX ADMIN — Medication 100 MCG: at 08:43

## 2025-05-27 RX ADMIN — PROPOFOL 50 MG: 10 INJECTION, EMULSION INTRAVENOUS at 08:30

## 2025-05-27 RX ADMIN — GLYCOPYRROLATE 0.2 MG: 0.2 INJECTION, SOLUTION INTRAMUSCULAR; INTRAVENOUS at 08:37

## 2025-05-27 RX ADMIN — ROCURONIUM BROMIDE 50 MG: 50 INJECTION, SOLUTION INTRAVENOUS at 07:53

## 2025-05-27 RX ADMIN — HEPARIN SODIUM 5000 UNITS: 5000 INJECTION, SOLUTION INTRAVENOUS; SUBCUTANEOUS at 06:42

## 2025-05-27 RX ADMIN — OXYCODONE HYDROCHLORIDE 10 MG: 5 TABLET ORAL at 18:17

## 2025-05-27 RX ADMIN — CEFAZOLIN SODIUM 2 G: 2 SOLUTION INTRAVENOUS at 08:30

## 2025-05-27 RX ADMIN — FENTANYL CITRATE 25 MCG: 50 INJECTION INTRAMUSCULAR; INTRAVENOUS at 11:29

## 2025-05-27 RX ADMIN — SUGAMMADEX 200 MG: 100 INJECTION, SOLUTION INTRAVENOUS at 09:49

## 2025-05-27 RX ADMIN — Medication 100 MCG: at 09:12

## 2025-05-27 RX ADMIN — POLYETHYLENE GLYCOL 3350 17 G: 17 POWDER, FOR SOLUTION ORAL at 12:24

## 2025-05-27 RX ADMIN — SODIUM CHLORIDE, POTASSIUM CHLORIDE, SODIUM LACTATE AND CALCIUM CHLORIDE: 600; 310; 30; 20 INJECTION, SOLUTION INTRAVENOUS at 09:13

## 2025-05-27 RX ADMIN — METHOCARBAMOL 500 MG: 500 TABLET ORAL at 14:00

## 2025-05-27 RX ADMIN — HYDROMORPHONE HYDROCHLORIDE 0.5 MG: 1 INJECTION, SOLUTION INTRAMUSCULAR; INTRAVENOUS; SUBCUTANEOUS at 10:28

## 2025-05-27 RX ADMIN — MIDAZOLAM 2 MG: 1 INJECTION INTRAMUSCULAR; INTRAVENOUS at 07:46

## 2025-05-27 RX ADMIN — IPRATROPIUM BROMIDE AND ALBUTEROL SULFATE 3 ML: .5; 3 SOLUTION RESPIRATORY (INHALATION) at 06:43

## 2025-05-27 RX ADMIN — LIDOCAINE HYDROCHLORIDE 100 MG: 20 INJECTION, SOLUTION INFILTRATION; PERINEURAL at 07:52

## 2025-05-27 RX ADMIN — FENTANYL CITRATE 100 MCG: 50 INJECTION, SOLUTION INTRAMUSCULAR; INTRAVENOUS at 07:52

## 2025-05-27 RX ADMIN — HEPARIN SODIUM 5000 UNITS: 5000 INJECTION INTRAVENOUS; SUBCUTANEOUS at 21:39

## 2025-05-27 RX ADMIN — PROPOFOL 120 MG: 10 INJECTION, EMULSION INTRAVENOUS at 07:52

## 2025-05-27 RX ADMIN — SENNOSIDES AND DOCUSATE SODIUM 2 TABLET: 50; 8.6 TABLET ORAL at 12:24

## 2025-05-27 RX ADMIN — ACETAMINOPHEN 650 MG: 325 TABLET ORAL at 06:42

## 2025-05-27 RX ADMIN — METHOCARBAMOL 500 MG: 500 TABLET ORAL at 21:39

## 2025-05-27 RX ADMIN — FENTANYL CITRATE 25 MCG: 50 INJECTION INTRAMUSCULAR; INTRAVENOUS at 17:26

## 2025-05-27 RX ADMIN — LIDOCAINE 1 PATCH: 4 PATCH TOPICAL at 12:08

## 2025-05-27 RX ADMIN — HEPARIN SODIUM 5000 UNITS: 5000 INJECTION INTRAVENOUS; SUBCUTANEOUS at 12:24

## 2025-05-27 RX ADMIN — ONDANSETRON 4 MG: 2 INJECTION INTRAMUSCULAR; INTRAVENOUS at 09:30

## 2025-05-27 SDOH — SOCIAL STABILITY: SOCIAL INSECURITY: DOES ANYONE TRY TO KEEP YOU FROM HAVING/CONTACTING OTHER FRIENDS OR DOING THINGS OUTSIDE YOUR HOME?: NO

## 2025-05-27 SDOH — SOCIAL STABILITY: SOCIAL INSECURITY: WITHIN THE LAST YEAR, HAVE YOU BEEN AFRAID OF YOUR PARTNER OR EX-PARTNER?: NO

## 2025-05-27 SDOH — ECONOMIC STABILITY: FOOD INSECURITY: WITHIN THE PAST 12 MONTHS, YOU WORRIED THAT YOUR FOOD WOULD RUN OUT BEFORE YOU GOT THE MONEY TO BUY MORE.: NEVER TRUE

## 2025-05-27 SDOH — SOCIAL STABILITY: SOCIAL INSECURITY: WITHIN THE LAST YEAR, HAVE YOU BEEN HUMILIATED OR EMOTIONALLY ABUSED IN OTHER WAYS BY YOUR PARTNER OR EX-PARTNER?: NO

## 2025-05-27 SDOH — SOCIAL STABILITY: SOCIAL INSECURITY: ARE THERE ANY APPARENT SIGNS OF INJURIES/BEHAVIORS THAT COULD BE RELATED TO ABUSE/NEGLECT?: NO

## 2025-05-27 SDOH — ECONOMIC STABILITY: FOOD INSECURITY: WITHIN THE PAST 12 MONTHS, THE FOOD YOU BOUGHT JUST DIDN'T LAST AND YOU DIDN'T HAVE MONEY TO GET MORE.: NEVER TRUE

## 2025-05-27 SDOH — SOCIAL STABILITY: SOCIAL INSECURITY: DO YOU FEEL ANYONE HAS EXPLOITED OR TAKEN ADVANTAGE OF YOU FINANCIALLY OR OF YOUR PERSONAL PROPERTY?: NO

## 2025-05-27 SDOH — ECONOMIC STABILITY: INCOME INSECURITY: IN THE PAST 12 MONTHS HAS THE ELECTRIC, GAS, OIL, OR WATER COMPANY THREATENED TO SHUT OFF SERVICES IN YOUR HOME?: NO

## 2025-05-27 SDOH — SOCIAL STABILITY: SOCIAL INSECURITY: HAVE YOU HAD THOUGHTS OF HARMING ANYONE ELSE?: NO

## 2025-05-27 SDOH — HEALTH STABILITY: PHYSICAL HEALTH: ON AVERAGE, HOW MANY DAYS PER WEEK DO YOU ENGAGE IN MODERATE TO STRENUOUS EXERCISE (LIKE A BRISK WALK)?: 0 DAYS

## 2025-05-27 SDOH — SOCIAL STABILITY: SOCIAL INSECURITY: ARE YOU OR HAVE YOU BEEN THREATENED OR ABUSED PHYSICALLY, EMOTIONALLY, OR SEXUALLY BY ANYONE?: NO

## 2025-05-27 SDOH — SOCIAL STABILITY: SOCIAL INSECURITY: HAVE YOU HAD ANY THOUGHTS OF HARMING ANYONE ELSE?: NO

## 2025-05-27 SDOH — SOCIAL STABILITY: SOCIAL INSECURITY: ABUSE: ADULT

## 2025-05-27 SDOH — HEALTH STABILITY: PHYSICAL HEALTH: ON AVERAGE, HOW MANY MINUTES DO YOU ENGAGE IN EXERCISE AT THIS LEVEL?: 0 MIN

## 2025-05-27 SDOH — SOCIAL STABILITY: SOCIAL INSECURITY: DO YOU FEEL UNSAFE GOING BACK TO THE PLACE WHERE YOU ARE LIVING?: NO

## 2025-05-27 SDOH — SOCIAL STABILITY: SOCIAL INSECURITY: HAS ANYONE EVER THREATENED TO HURT YOUR FAMILY OR YOUR PETS?: NO

## 2025-05-27 ASSESSMENT — ENCOUNTER SYMPTOMS
FOCAL WEAKNESS: 0
POOR WOUND HEALING: 0
COLOR CHANGE: 0
SPUTUM PRODUCTION: 0
DOUBLE VISION: 0
DYSPNEA ON EXERTION: 0
CHILLS: 0
BRUISES/BLEEDS EASILY: 0
FEVER: 0
ALTERED MENTAL STATUS: 0
MUSCLE CRAMPS: 0
COUGH: 0
DIFFICULTY WITH CONCENTRATION: 0
ADENOPATHY: 0
DYSURIA: 0
JOINT SWELLING: 0
NERVOUS/ANXIOUS: 0
BLURRED VISION: 0
FREQUENCY: 0
VOMITING: 0
NAUSEA: 0
HALLUCINATIONS: 0
DIZZINESS: 0
ANOREXIA: 0
WHEEZING: 0

## 2025-05-27 ASSESSMENT — COGNITIVE AND FUNCTIONAL STATUS - GENERAL
MOBILITY SCORE: 24
DAILY ACTIVITIY SCORE: 24
PATIENT BASELINE BEDBOUND: NO

## 2025-05-27 ASSESSMENT — ACTIVITIES OF DAILY LIVING (ADL)
GROOMING: INDEPENDENT
TOILETING: INDEPENDENT
WALKS IN HOME: INDEPENDENT
ADEQUATE_TO_COMPLETE_ADL: YES
DRESSING YOURSELF: INDEPENDENT
HEARING - RIGHT EAR: FUNCTIONAL
FEEDING YOURSELF: INDEPENDENT
PATIENT'S MEMORY ADEQUATE TO SAFELY COMPLETE DAILY ACTIVITIES?: YES
BATHING: INDEPENDENT
LACK_OF_TRANSPORTATION: NO
JUDGMENT_ADEQUATE_SAFELY_COMPLETE_DAILY_ACTIVITIES: YES
HEARING - LEFT EAR: FUNCTIONAL
LACK_OF_TRANSPORTATION: NO
LACK_OF_TRANSPORTATION: NO

## 2025-05-27 ASSESSMENT — PAIN DESCRIPTION - DESCRIPTORS
DESCRIPTORS: SHARP

## 2025-05-27 ASSESSMENT — PAIN SCALES - GENERAL
PAINLEVEL_OUTOF10: 0 - NO PAIN
PAINLEVEL_OUTOF10: 3
PAINLEVEL_OUTOF10: 8
PAINLEVEL_OUTOF10: 8
PAINLEVEL_OUTOF10: 0 - NO PAIN
PAINLEVEL_OUTOF10: 8
PAINLEVEL_OUTOF10: 7
PAIN_LEVEL: 0
PAINLEVEL_OUTOF10: 8

## 2025-05-27 ASSESSMENT — PAIN DESCRIPTION - LOCATION: LOCATION: CHEST

## 2025-05-27 ASSESSMENT — PAIN - FUNCTIONAL ASSESSMENT
PAIN_FUNCTIONAL_ASSESSMENT: 0-10

## 2025-05-27 ASSESSMENT — LIFESTYLE VARIABLES
AUDIT-C TOTAL SCORE: 1
HOW OFTEN DO YOU HAVE A DRINK CONTAINING ALCOHOL: MONTHLY OR LESS
AUDIT-C TOTAL SCORE: 1
SKIP TO QUESTIONS 9-10: 1
HOW MANY STANDARD DRINKS CONTAINING ALCOHOL DO YOU HAVE ON A TYPICAL DAY: 1 OR 2
HOW OFTEN DO YOU HAVE 6 OR MORE DRINKS ON ONE OCCASION: NEVER
SUBSTANCE_ABUSE: 0

## 2025-05-27 ASSESSMENT — COLUMBIA-SUICIDE SEVERITY RATING SCALE - C-SSRS
2. HAVE YOU ACTUALLY HAD ANY THOUGHTS OF KILLING YOURSELF?: NO
6. HAVE YOU EVER DONE ANYTHING, STARTED TO DO ANYTHING, OR PREPARED TO DO ANYTHING TO END YOUR LIFE?: NO
1. IN THE PAST MONTH, HAVE YOU WISHED YOU WERE DEAD OR WISHED YOU COULD GO TO SLEEP AND NOT WAKE UP?: NO

## 2025-05-27 ASSESSMENT — PAIN DESCRIPTION - ORIENTATION: ORIENTATION: LEFT

## 2025-05-27 NOTE — ANESTHESIA PROCEDURE NOTES
Arterial Line:    Date/Time: 5/27/2025 8:14 AM    Staffing  Performed: attending   Authorized by: Raleigh Cool MD    Performed by: ABI Casper    An arterial line was placed. Procedure performed using ultrasound guidance and surface landmarks.in the OR for the following indication(s): continuous blood pressure monitoring and blood sampling needed.    A 20 gauge (size), 12 cm (length), Arrow (type) catheter was placed into the Right brachial artery, secured by Tegaderm,   Seldinger technique used.  Events:  patient tolerated procedure well with no complications.      Additional notes:  First attempt- radial artery by ASHLEY. Second attempt- radial artery by CAA. Small hematoma formed and pressure dressing was applied. Attending then cannulated brachial artery on first attempt.

## 2025-05-27 NOTE — CARE PLAN
The patient's goals for the shift include      The clinical goals for the shift include  hemodynamically stable LOS    Over the shift, the patient did not make progress toward the following goals. Barriers to progression include . Recommendations to address these barriers include .

## 2025-05-27 NOTE — INTERVAL H&P NOTE
H&P reviewed. The patient was examined and there are no changes to the H&P.    Michelle Chan MD  General Surgery

## 2025-05-27 NOTE — ANESTHESIA POSTPROCEDURE EVALUATION
Patient: Tuyet Ramirez    Procedure Summary       Date: 05/27/25 Room / Location: [facility] / [facility]    Anesthesia Start: 0744 Anesthesia Stop: 1004    Procedure: ROBOTIC ASSISTED LEFT LUNG WEDGE RESECTION (Left: Chest) Diagnosis:       Primary cancer of left upper lobe of lung (Multi)      (Primary cancer of left upper lobe of lung (Multi) [C34.12])    Surgeons: Josh Tuttle MD Responsible Provider: Raleigh Cool MD    Anesthesia Type: general ASA Status: 3            Anesthesia Type: general    Vitals Value Taken Time   /85 05/27/25 10:00   Temp 36.1 05/27/25 10:04   Pulse 79 05/27/25 10:02   Resp 18 05/27/25 10:04   SpO2 100 % 05/27/25 10:02   Vitals shown include unfiled device data.    Anesthesia Post Evaluation    Patient location during evaluation: PACU  Patient participation: complete - patient participated  Level of consciousness: sleepy but conscious and awake  Pain score: 0  Pain management: adequate  Airway patency: patent  Cardiovascular status: acceptable and blood pressure returned to baseline  Respiratory status: acceptable and face mask  Hydration status: acceptable  Postoperative Nausea and Vomiting: none        There were no known notable events for this encounter.

## 2025-05-27 NOTE — H&P
[facility]   Thoracic Surgery   History of Presenting Illness       History Of Present Illness  Tuyet Ramirez is a 69 y.o. female with a PMH significant for left upper lobe adenocarcinoma, Colon cancer s/p bowel resection, COPD, HTN, and depression  who presents to [facility] on 5/27/2025 for planned Left lung wedge resection with Dr. Tuttle.     Subjective    Past Medical History  She has a past medical history of Colon cancer (Multi), Lung nodule, and Nausea (01/14/2022).    Surgical History  She has a past surgical history that includes Other surgical history (11/04/2021); Other surgical history (11/04/2021); Other surgical history (01/14/2022); Other surgical history (01/14/2022); and Other surgical history (12/01/2021).     Social History  She reports that she has been smoking cigarettes. She has been exposed to tobacco smoke. She has never used smokeless tobacco. She reports current alcohol use. She reports current drug use. Frequency: 21.00 times per week. Drug: Marijuana.    Family History  Family History[1]     Allergies  Patient has no known allergies.    Review of Systems   Constitutional: Negative for chills and fever.   HENT:  Negative for congestion, ear pain and hearing loss.    Eyes:  Negative for blurred vision and double vision.   Cardiovascular:  Negative for chest pain, dyspnea on exertion and leg swelling.   Respiratory:  Negative for cough, sputum production and wheezing.    Endocrine: Negative for cold intolerance and heat intolerance.   Hematologic/Lymphatic: Negative for adenopathy and bleeding problem. Does not bruise/bleed easily.   Skin:  Negative for color change, itching and poor wound healing.   Musculoskeletal:  Negative for arthritis, joint swelling and muscle cramps.   Gastrointestinal:  Negative for anorexia, nausea and vomiting.   Genitourinary:  Negative for dysuria and frequency.   Neurological:  Negative for  difficulty with concentration, dizziness and focal weakness.   Psychiatric/Behavioral:  Negative for altered mental status, hallucinations and substance abuse. The patient is not nervous/anxious.             Objective    Physical Exam  Vitals and nursing note reviewed.   Constitutional:       General: She is awake. She is not in acute distress.     Appearance: Normal appearance.   HENT:      Head: Normocephalic and atraumatic.      Right Ear: Hearing normal.      Nose: Nose normal.      Mouth/Throat:      Lips: Pink.      Mouth: Mucous membranes are dry.   Eyes:      General: Lids are normal.      Extraocular Movements: Extraocular movements intact.      Conjunctiva/sclera: Conjunctivae normal.      Pupils: Pupils are equal, round, and reactive to light.   Cardiovascular:      Rate and Rhythm: Normal rate.      Pulses: Normal pulses.      Heart sounds: S1 normal and S2 normal.   Pulmonary:      Effort: No tachypnea.      Breath sounds: Examination of the left-upper field reveals decreased breath sounds and rhonchi. Examination of the left-middle field reveals decreased breath sounds and rhonchi. Examination of the right-lower field reveals decreased breath sounds. Examination of the left-lower field reveals decreased breath sounds. Decreased breath sounds and rhonchi present.   Chest:      Comments: Left pleural chest tube present  Left chest thorascopic incisions  Abdominal:      General: Bowel sounds are decreased.      Palpations: Abdomen is soft.      Tenderness: There is no abdominal tenderness.   Musculoskeletal:      Cervical back: Normal range of motion and neck supple.   Skin:     General: Skin is warm and dry.      Capillary Refill: Capillary refill takes less than 2 seconds.   Neurological:      General: No focal deficit present.      Mental Status: She is alert and oriented to person, place, and time.      GCS: GCS eye subscore is 4. GCS verbal subscore is 5. GCS motor subscore is 6.      Motor: Motor  function is intact.   Psychiatric:         Attention and Perception: Attention normal.         Behavior: Behavior normal. Behavior is cooperative.         Cognition and Memory: Cognition normal.         Vitals  Vitals:    05/27/25 1300   BP:    Pulse: 76   Resp: (!) 33   Temp:    SpO2: 93%       Home Medications  Current Outpatient Medications   Medication Instructions    albuterol 90 mcg/actuation inhaler 2 puffs, Every 4 hours PRN    alendronate (FOSAMAX) 70 mg, Every 7 days    amLODIPine (NORVASC) 10 mg, Daily    carvedilol (Coreg) 12.5 mg tablet Take 1 tablet (12.5 mg) by mouth once daily.    gabapentin (NEURONTIN) 300 mg, oral, 3 times daily    hydrOXYzine HCL (ATARAX) 25 mg, Every 8 hours PRN    ibuprofen 800 mg tablet 1 tablet, Every 8 hours PRN    lidocaine 4 % patch 1 patch, transdermal, Daily, Remove & discard patch within 12 hours or as directed by MD.    naloxone (NARCAN) 4 mg, nasal, As needed, May repeat every 2-3 minutes if needed, alternating nostrils, until medical assistance becomes available.    omeprazole (PriLOSEC) 40 mg DR capsule TAKE 1 TABLET BY MOUTH 30-60 MINUTES BEFORE BREAKFAST.    rosuvastatin (CRESTOR) 10 mg, Daily    sertraline (ZOLOFT) 50 mg, Daily    Stiolto Respimat 2.5-2.5 mcg/actuation mist inhaler 2 Inhalations, Daily    triamcinolone (Kenalog) 0.5 % cream         Inpatient Medications  Scheduled medications  Scheduled Medications[2]  Continuous medications  Continuous Medications[3]  PRN medications  PRN Medications[4]     LDA:  Arterial Line 05/27/25 Right Brachial (Active)   Placement Date/Time: 05/27/25 (c) 0814   Size: 20 G  Orientation: Right  Location: Brachial  Securement Method: Transparent dressing  Patient Tolerance: Tolerated well   Number of days: 0       Chest Tube 1 Left Pleural 28 Fr (Active)   Placement Date/Time: 05/27/25 0924   Placed by: DR. MIRANDA  Hand Hygiene Completed: Yes  Tube Number: 1  Chest Tube Orientation: Left  Chest Tube Location: Pleural  Chest  "Tube Drain Tube Size (Fr): 28 Fr   Number of days: 0           Relevant Results  Vitals  Vitals:    05/27/25 1300   BP:    Pulse: 76   Resp: (!) 33   Temp:    SpO2: 93%       Lab Review      Results from last 7 days   Lab Units 05/27/25  0650   SODIUM mmol/L 138   POTASSIUM mmol/L 4.1   CHLORIDE mmol/L 103   CO2 mmol/L 27   BUN mg/dL 13   CREATININE mg/dL 0.73   CALCIUM mg/dL 9.1   GLUCOSE mg/dL 109*               I/O:  No intake/output data recorded.        Radiographic Testing  EKG:  EKG 12 lead       ECG 12 Lead 11/09/2024      ECG 12 lead (Ancillary Performed) 05/03/2024    Echo:  Transthoracic Echo (TTE) Complete 11/11/2024    Ejection Fractions:  EF   Date/Time Value Ref Range Status   11/11/2024 05:45 PM 58 %      Cath:  No results found for this or any previous visit from the past 1095 days.    Stress Test:  No results found for this or any previous visit from the past 1095 days.    Cardiac Imaging:  No results found for this or any previous visit from the past 1095 days.    Chest Imaging:  XR chest 1 view   Final Result   1.  As above.        Signed by: Jeb Walker 5/27/2025 11:38 AM   Dictation workstation:   [id number]                 Daily Progress Note    5/27/2025: Patient arrived to unit from PACU in stable condition. She is on 3L NC. Only complaint at this time is pain. She is scheduled tylenol, mag-ox, robaxin, and lidocaine patches. PRN Oxy and fentanyl. Plan to give one time dose of dilaudid. Minimal chest tube output. Chest tube with small intermittent airleak present; maintain to waterseal and follow up CXR in morning.     - Initial Chest Tube Output: 40mL    -Current O2 Requirements: 3L NC       Assessment & Plan       Left Upper Lobe Lung Nodule  - CT scan of the chest that noted acute rib fractures. Concurrently they noted a 1.4 cm left upper lobe lung nodule along with \"several tiny granuloma seen within the lungs\", which appear grossly stable. She received follow-up imaging with a PET " scan on 3/3/2025, which showed mild FDG avidity with a left upper lobe 1.5 cm lesion (SUV 2.7)   - CT biopsy on 3/31/25.  Pathology showed Adenocarcinoma, acinar pattern   - S/p robotic left lung wedge resection and lymph node resection on 5/27/2025   with Thoracic Surgeon, Dr. Josh Tuttle   - Maintain left pleural chest tube to waterseal  --> CT with small intermittent airleak  - Daily CXR while chest tube in place  - Follow up surgical pathology         Acute Respiratory Insufficiency   - As anticipated in the immediate post operative period   - Current O2 Requirements: 3L NC  - IS/Deep Breathing Exercises  - Keep SpO2 > 92%  - Wean supplemental O2 as tolerable       Risk for Post-Operative Arrythmia   - Given thoracic surgery, patient is at risk for development of atrial fibrilllation   - Plan to schedule on 5mg IV Lopressor in the immediate post-op period and continue for 30 days  - Observe & replace electrolytes       Acute Post-Op Pain  - As expected   - Multimodal pain control   --> Scheduled: Acetaminophen, magnesium oxide 400mg QD, gabapentin 100mg TID, methocarbamol 500mg q6h   --> PRN: oxycodone & fentanyl  - Bowel regimen while taking narcotics        Essential HTN  - Home Medication: Carvedilol 12.5mg Daily, Amlodipine 10mg daily  - Hold carvedilol in the immediate post-op period while on IV lopressor  - Continue amlodipine         Hyperlipidemia  - Home Medication: Rosuvastatin 10mg Daily  - Continue statin therapy as above    Depression  - Continue Zoloft    Risk for Electrolyte Disturbances  - Optimize electrolytes per [facility] protocol      Bowel Regimen  - Senna-S BID   - PRN suppositories and miralax    Prophylaxis  - GI: PPI  - DVT: Brent-Hose & starting heparin on POD #1  - PT/OT eval-once clinically stable     Disposition  - CVICU      Above patient and plan discussed with thoracic surgeon, Dr. Stella Tuttle.     Nadja Madison, APRN-CNP         [1]   Family History  Problem Relation  Name Age of Onset    Other (small cell sarcoma) Father     [2] [START ON 5/28/2025] amLODIPine, 10 mg, oral, Daily  tiotropium, 2 puff, inhalation, Daily   And  formoterol, 20 mcg, nebulization, q12h  heparin (porcine), 5,000 Units, subcutaneous, q8h  HYDROmorphone, 0.4 mg, intravenous, Once  lidocaine, 1 patch, transdermal, Daily  methocarbamol, 500 mg, oral, q8h TOBIAS  metoprolol tartrate, 5 mg, intravenous, q4h TOBIAS  [START ON 5/28/2025] pantoprazole, 40 mg, oral, Daily before breakfast  polyethylene glycol, 17 g, oral, Daily  [START ON 5/28/2025] rosuvastatin, 10 mg, oral, Daily  sennosides-docusate sodium, 2 tablet, oral, BID  [START ON 5/28/2025] sertraline, 50 mg, oral, Daily  [3]    [4] PRN medications: albuterol, bisacodyl, bisacodyl, fentaNYL, hydrOXYzine HCL, naloxone, ondansetron **OR** ondansetron, oxyCODONE, oxyCODONE, oxygen

## 2025-05-27 NOTE — OP NOTE
ROBOTIC ASSISTED LEFT LUNG WEDGE RESECTION (L) Operative Note  Patient: Tuyte Ramirez  : 1955  MRN: [id number]    Preoperative Diagnosis: Primary cancer of left upper lobe of lung (Multi) [C34.12]  Postoperative Diagnosis: Primary cancer of left upper lobe of lung (Multi) [C34.12]    Procedure: Procedure(s) (LRB):  ROBOTIC ASSISTED LEFT LUNG WEDGE RESECTION (Left), lymph node dissection    Surgeon: Surgeon(s):  LISA Buitrago MD    Other Staff:   Surgeons and Role:     * Shazia Garcia PA-C - Assisting   Circulator: Palma De La Torre RN  Relief Circulator: Virginia Boykin RN  Scrub Person: Sweetie Aleman    Anesthesia Type: General    Indications: Tuyet Ramirez is an 69 y.o. female who presents for Primary cancer of left upper lobe of lung (Multi) [C34.12]. I recommended sublobar resection given the patient's other medical comorbidities and the small peripheral nature of the tumor    The patient was seen in the preoperative area. The risks, benefits, complications, treatment options, non-operative alternatives, expected recovery and outcomes were discussed with the patient. The possibilities of reaction to medication, pulmonary aspiration, injury to surrounding structures, bleeding, recurrent infection, the need for additional procedures, failure to diagnose a condition, and creating a complication requiring transfusion or operation were discussed with the patient. The patient concurred with the proposed plan, giving informed consent.  The site of surgery was properly noted/marked if necessary per policy.     Procedure Details:   The patient was placed on the operating table. A safety huddle was performed. General endotracheal anesthesia was initiated. Bronchoscopy was performed using the endotracheal tube which showed no evidence of endobronchial tumors or other lesions.  A double-lumen endotracheal tube was positioned and secured.    The patient was positioned in lateral  "decubitus position. The patient was prepped with chlorhexidine & alcohol.    I started making 5 robotic/thoracoscopic port incisions in the left chest. I placed multi-level intercostal nerve blocks.  We used CO2 insufflation. We docked the robot and explored the chest.    I noted a small parenchymal lung injury from trocar insertion.  I identified a mass consistent with the patient's known abnormality in the Left upper lobe.  I decided to perform a wedge resection.  I performed a wide wedge resection using serial firings of endoscopic staplers until the specimen was completely freed.  After this, the specimen was placed in an Endo Catch bag and removed for pathologic evaluation.  After removal of the specimen from the patient, I felt that the surgical margins were adequate. I therefore sent the specimen for permanent pathologic analysis..   I also performed a mediastinal lymph node dissection.  I took lymph nodes from levels 5L, 6L, 7, 9L, and 11L. At the conclusion of this I felt hemostasis was appropriate.     I turned my attention to the parenchymal lung injury.  This was quite small, but I felt closing it would reduce the risk of air leak.  I quite simply closed this with a single firing of a staple load.    The chest was evaluated for hemostasis and felt to be appropriate. I placed a 28F chest tube and re-expanded the lung. The lung expanded well. The instruments were removed. The wounds were closed in layers.  The skin was dressed with Dermabond.  The procedure was completed and patient was brought to Recovery without evidence of immediate complications.         The physicians assistant in this case was involved because no suitable resident was available to perform the \"bedside assist\" role. The physicians assistant in this case was involved in the actual performance of the covered surgical procedure and not simply present to perform ancillary services     Estimated blood loss: 5  Complications: " None  Disposition: Recovery  Condition: stable    Attending Attestation: I was present and scrubbed for the entire procedure.    Specimens:   ID Type Source Tests Collected by Time   1 : LEFT UPPER LOBE WEDGE Tissue LUNG WEDGE RESECTION LEFT (SPECIFY SITE) SURGICAL PATHOLOGY EXAM Josh Tuttle MD 5/27/2025 0905   2 : LYMPH NODE LEVEL 10 Tissue LYMPH NODE PULMONARY (SPECIFY SITE) SURGICAL PATHOLOGY EXAM Josh Tuttle MD 5/27/2025 0913   3 : LYMPH NODE LEVEL 5 Tissue LYMPH NODE PULMONARY (SPECIFY SITE) SURGICAL PATHOLOGY EXAM Josh Tuttle MD 5/27/2025 0915   4 : LYMPH NODE LEVEL 6 Tissue LYMPH NODE PULMONARY (SPECIFY SITE) SURGICAL PATHOLOGY EXAM Josh Tuttle MD 5/27/2025 0918   5 : LYMPH NODE LEVEL 9 Tissue LYMPH NODE PULMONARY (SPECIFY SITE) SURGICAL PATHOLOGY EXAM Josh Tuttle MD 5/27/2025 0922   6 : LYMPH NODE LEVEL 7 Tissue LYMPH NODE PULMONARY (SPECIFY SITE) SURGICAL PATHOLOGY EXAM Josh Tuttle MD 5/27/2025 0923       Josh Tuttle  Phone Number: [phone]

## 2025-05-27 NOTE — ANESTHESIA PROCEDURE NOTES
Airway  Date/Time: 5/27/2025 7:58 AM  Reason: elective    Airway not difficult    Staffing  Performed: ASHLEY   Authorized by: Raleigh Cool MD    Performed by: ABI Casper  Patient location during procedure: OR    Patient Condition  Indications for airway management: anesthesia and airway protection  Patient position: sniffing  Planned trial extubation  Sedation level: deep     Final Airway Details   Preoxygenated: yes  Final airway type: endotracheal airway  Successful airway: ETT - double lumen left  Cuffed: yes   Successful intubation technique: video laryngoscopy  Adjuncts used in placement: intubating stylet  Endotracheal tube insertion site: oral  Blade: Josie  Blade size: #3  ETT DL size (fr): 35  Cormack-Lehane Classification: grade I - full view of glottis  Placement verified by: chest auscultation and capnometry   Measured from: teeth  ETT to teeth (cm): 27  Number of attempts at approach: 1  Number of other approaches attempted: 0

## 2025-05-27 NOTE — CONSULTS
Consults    Reason For Consult  Postop robotic left lung wedge resection    History Of Present Illness  Tuyet Ramirez is a 69 y.o. female with past medical history of colon cancer s/p bowel resection, COPD, HTN, depression, left upper lobe adenocarcinoma, presented to hospital for planned left lung wedge resection.  She was a chronic smoker who quit smoking a month ago.  She had initially presented in September 2024 with 5 days pleuritic chest pain.  A CT scan showed a 1.4 cm left upper lobe lung nodule along with several tiny granulomas seen within the lungs which were grossly stable.  She had a follow-up PET scan in March or 2025 which showed mild FDG avidity in the left upper lobe 1.5 cm lesion.  CT biopsy was then performed which showed adenocarcinoma.  PFTs 6% and DLCO 63% on previous PFTs.  She underwent, robotic left upper lobe wedge resection successfully.  She has been referred to ICU for further postop medical care.      Past Medical History  She has a past medical history of Colon cancer (Multi), Lung nodule, and Nausea (01/14/2022).    Surgical History  She has a past surgical history that includes Other surgical history (11/04/2021); Other surgical history (11/04/2021); Other surgical history (01/14/2022); Other surgical history (01/14/2022); and Other surgical history (12/01/2021).     Social History  She reports that she has been smoking cigarettes. She has been exposed to tobacco smoke. She has never used smokeless tobacco. She reports current alcohol use. She reports current drug use. Frequency: 21.00 times per week. Drug: Marijuana.    Family History  Family History[1]     Allergies  Patient has no known allergies.    Review of Systems  A 12 point review of systems was performed and otherwise negative except as stated in the HPI.      Physical Exam  General:  Pleasant and cooperative.  Mild distress from breathing.  Overall comfortable.  HEENT:  Normocephalic, atraumatic, mucus membranes moist.    Neck:  Trachea midline.  No JVD.    Chest: Mildly diminished breath sounds bilaterally.  No crackles.  No wheezing.  CV:  Regular rate and rhythm.  Positive S1/S2.   Abdomen: Bowel sounds present in all four quadrants, abdomen is soft, non-tender, non-distended.  Extremities:  No lower extremity edema or cyanosis.   Neurological:  AAOx3. No focal deficits.  Skin:  Warm and dry.        Last Recorded Vitals  BP 95/53   Pulse 76   Temp 36.4 °C (97.5 °F) (Temporal)   Resp 18   Wt 72 kg (158 lb 11.7 oz)   SpO2 95%     Relevant Results  All labs and imaging were reviewed by myself.     Assessment/Plan     Neuro:   # Depression  - Continue home sertraline  -ANO x 3  - ICU delirium precautions  -Multimodal pain control  - No acute issues    CV:  # HTN  -Continue home Norvasc, Crestor  - Patient has normal EF on previous echo    Respiratory:  # BRIGHT adenocarcinoma s/p robotic wedge resection  # Postop Respiratory insufficiency  # COPD  -Currently on 2 L nasal cannula oxygen.  Target sat 88-92 as patient has COPD.  - Aggressive bronchopulmonary hygiene, use of incentive spirometry  - Patient has chest tube in situ.  Monitor chest tube output.    GI:  # Colon cancer s/p bowel resection  -Regular diet  - No acute issues    Endo:  -Monitor blood sugars  -No acute issues    Renal:  -Renal function normal.  Monitor urine output.  - Electrolytes normal  - No acute issues    Hematological:  -Hemoglobin normal.  - No acute issue    ID:  -Patient afebrile.  No concerns of infection.  Patient received Ancef in the immediate postop period    Vent/O2: Nasal cannula  Lines/Devices: Peripheral, chest tube  Drips: None  ATBx: None  Fluids: None  Diet: Regular  PPX: Protonix  Code status: Full code  Dispo: ICU     Judith Griffin MD  PGY 2 Critical Care           [1]   Family History  Problem Relation Name Age of Onset    Other (small cell sarcoma) Father

## 2025-05-28 ENCOUNTER — APPOINTMENT (OUTPATIENT)
Dept: RADIOLOGY | Facility: HOSPITAL | Age: 70
End: 2025-05-28
Payer: MEDICARE

## 2025-05-28 DIAGNOSIS — E78.2 MIXED HYPERLIPIDEMIA: ICD-10-CM

## 2025-05-28 DIAGNOSIS — I10 PRIMARY HYPERTENSION: ICD-10-CM

## 2025-05-28 DIAGNOSIS — R06.9 UNSPECIFIED ABNORMALITIES OF BREATHING: ICD-10-CM

## 2025-05-28 DIAGNOSIS — F17.211 CIGARETTE NICOTINE DEPENDENCE IN REMISSION: ICD-10-CM

## 2025-05-28 LAB
ALBUMIN SERPL BCP-MCNC: 3.8 G/DL (ref 3.4–5)
ANION GAP SERPL CALC-SCNC: 11 MMOL/L (ref 10–20)
BUN SERPL-MCNC: 10 MG/DL (ref 6–23)
CALCIUM SERPL-MCNC: 8.4 MG/DL (ref 8.6–10.3)
CHLORIDE SERPL-SCNC: 104 MMOL/L (ref 98–107)
CO2 SERPL-SCNC: 26 MMOL/L (ref 21–32)
CREAT SERPL-MCNC: 0.65 MG/DL (ref 0.5–1.05)
EGFRCR SERPLBLD CKD-EPI 2021: >90 ML/MIN/1.73M*2
ERYTHROCYTE [DISTWIDTH] IN BLOOD BY AUTOMATED COUNT: 12.7 % (ref 11.5–14.5)
GLUCOSE SERPL-MCNC: 120 MG/DL (ref 74–99)
HCT VFR BLD AUTO: 35.5 % (ref 36–46)
HGB BLD-MCNC: 11.4 G/DL (ref 12–16)
MAGNESIUM SERPL-MCNC: 2.05 MG/DL (ref 1.6–2.4)
MCH RBC QN AUTO: 30.6 PG (ref 26–34)
MCHC RBC AUTO-ENTMCNC: 32.1 G/DL (ref 32–36)
MCV RBC AUTO: 95 FL (ref 80–100)
NRBC BLD-RTO: 0 /100 WBCS (ref 0–0)
PHOSPHATE SERPL-MCNC: 3 MG/DL (ref 2.5–4.9)
PLATELET # BLD AUTO: 197 X10*3/UL (ref 150–450)
POTASSIUM SERPL-SCNC: 3.9 MMOL/L (ref 3.5–5.3)
RBC # BLD AUTO: 3.72 X10*6/UL (ref 4–5.2)
SODIUM SERPL-SCNC: 137 MMOL/L (ref 136–145)
WBC # BLD AUTO: 8.3 X10*3/UL (ref 4.4–11.3)

## 2025-05-28 PROCEDURE — 2500000001 HC RX 250 WO HCPCS SELF ADMINISTERED DRUGS (ALT 637 FOR MEDICARE OP)

## 2025-05-28 PROCEDURE — 2500000005 HC RX 250 GENERAL PHARMACY W/O HCPCS

## 2025-05-28 PROCEDURE — 85027 COMPLETE CBC AUTOMATED: CPT

## 2025-05-28 PROCEDURE — 99232 SBSQ HOSP IP/OBS MODERATE 35: CPT

## 2025-05-28 PROCEDURE — 83735 ASSAY OF MAGNESIUM: CPT

## 2025-05-28 PROCEDURE — 37799 UNLISTED PX VASCULAR SURGERY: CPT

## 2025-05-28 PROCEDURE — 80051 ELECTROLYTE PANEL: CPT

## 2025-05-28 PROCEDURE — 71045 X-RAY EXAM CHEST 1 VIEW: CPT

## 2025-05-28 PROCEDURE — 2500000002 HC RX 250 W HCPCS SELF ADMINISTERED DRUGS (ALT 637 FOR MEDICARE OP, ALT 636 FOR OP/ED)

## 2025-05-28 PROCEDURE — 2500000004 HC RX 250 GENERAL PHARMACY W/ HCPCS (ALT 636 FOR OP/ED)

## 2025-05-28 PROCEDURE — 71045 X-RAY EXAM CHEST 1 VIEW: CPT | Performed by: RADIOLOGY

## 2025-05-28 PROCEDURE — 97161 PT EVAL LOW COMPLEX 20 MIN: CPT | Mod: GP

## 2025-05-28 PROCEDURE — 94640 AIRWAY INHALATION TREATMENT: CPT

## 2025-05-28 PROCEDURE — 97165 OT EVAL LOW COMPLEX 30 MIN: CPT | Mod: GO

## 2025-05-28 PROCEDURE — 2020000001 HC ICU ROOM DAILY

## 2025-05-28 RX ORDER — CARVEDILOL 12.5 MG/1
12.5 TABLET ORAL DAILY
Status: DISCONTINUED | OUTPATIENT
Start: 2025-05-28 | End: 2025-05-29 | Stop reason: HOSPADM

## 2025-05-28 RX ADMIN — FORMOTEROL FUMARATE DIHYDRATE 20 MCG: 20 SOLUTION RESPIRATORY (INHALATION) at 19:00

## 2025-05-28 RX ADMIN — HEPARIN SODIUM 5000 UNITS: 5000 INJECTION INTRAVENOUS; SUBCUTANEOUS at 13:10

## 2025-05-28 RX ADMIN — OXYCODONE HYDROCHLORIDE 10 MG: 5 TABLET ORAL at 00:41

## 2025-05-28 RX ADMIN — METHOCARBAMOL 500 MG: 500 TABLET ORAL at 06:01

## 2025-05-28 RX ADMIN — METOPROLOL TARTRATE 5 MG: 5 INJECTION INTRAVENOUS at 03:18

## 2025-05-28 RX ADMIN — SERTRALINE HYDROCHLORIDE 50 MG: 50 TABLET ORAL at 08:38

## 2025-05-28 RX ADMIN — ROSUVASTATIN CALCIUM 10 MG: 10 TABLET, FILM COATED ORAL at 08:38

## 2025-05-28 RX ADMIN — METOPROLOL TARTRATE 5 MG: 5 INJECTION INTRAVENOUS at 06:25

## 2025-05-28 RX ADMIN — MAGNESIUM OXIDE TAB 400 MG (241.3 MG ELEMENTAL MG) 1 TABLET: 400 (241.3 MG) TAB at 08:38

## 2025-05-28 RX ADMIN — SENNOSIDES AND DOCUSATE SODIUM 2 TABLET: 50; 8.6 TABLET ORAL at 08:38

## 2025-05-28 RX ADMIN — OXYCODONE HYDROCHLORIDE 10 MG: 5 TABLET ORAL at 13:10

## 2025-05-28 RX ADMIN — OXYCODONE HYDROCHLORIDE 10 MG: 5 TABLET ORAL at 19:57

## 2025-05-28 RX ADMIN — HEPARIN SODIUM 5000 UNITS: 5000 INJECTION INTRAVENOUS; SUBCUTANEOUS at 20:00

## 2025-05-28 RX ADMIN — LIDOCAINE 1 PATCH: 4 PATCH TOPICAL at 08:38

## 2025-05-28 RX ADMIN — SENNOSIDES AND DOCUSATE SODIUM 2 TABLET: 50; 8.6 TABLET ORAL at 20:00

## 2025-05-28 RX ADMIN — METHOCARBAMOL 500 MG: 500 TABLET ORAL at 21:03

## 2025-05-28 RX ADMIN — METHOCARBAMOL 500 MG: 500 TABLET ORAL at 13:10

## 2025-05-28 RX ADMIN — OXYCODONE HYDROCHLORIDE 10 MG: 5 TABLET ORAL at 07:27

## 2025-05-28 RX ADMIN — POLYETHYLENE GLYCOL 3350 17 G: 17 POWDER, FOR SOLUTION ORAL at 08:38

## 2025-05-28 RX ADMIN — TIOTROPIUM BROMIDE INHALATION SPRAY 2 PUFF: 3.12 SPRAY, METERED RESPIRATORY (INHALATION) at 08:09

## 2025-05-28 RX ADMIN — CARVEDILOL 12.5 MG: 12.5 TABLET, FILM COATED ORAL at 10:33

## 2025-05-28 RX ADMIN — HEPARIN SODIUM 5000 UNITS: 5000 INJECTION INTRAVENOUS; SUBCUTANEOUS at 06:01

## 2025-05-28 RX ADMIN — PANTOPRAZOLE SODIUM 40 MG: 40 TABLET, DELAYED RELEASE ORAL at 06:01

## 2025-05-28 RX ADMIN — AMLODIPINE BESYLATE 10 MG: 10 TABLET ORAL at 08:38

## 2025-05-28 ASSESSMENT — COGNITIVE AND FUNCTIONAL STATUS - GENERAL
DAILY ACTIVITIY SCORE: 21
DRESSING REGULAR LOWER BODY CLOTHING: A LITTLE
HELP NEEDED FOR BATHING: A LITTLE
DAILY ACTIVITIY SCORE: 19
MOBILITY SCORE: 22
TOILETING: A LITTLE
DRESSING REGULAR UPPER BODY CLOTHING: A LITTLE
CLIMB 3 TO 5 STEPS WITH RAILING: A LITTLE
PERSONAL GROOMING: A LITTLE
STANDING UP FROM CHAIR USING ARMS: A LITTLE
DRESSING REGULAR LOWER BODY CLOTHING: A LITTLE
CLIMB 3 TO 5 STEPS WITH RAILING: A LITTLE
WALKING IN HOSPITAL ROOM: A LITTLE
MOBILITY SCORE: 20
WALKING IN HOSPITAL ROOM: A LITTLE
DRESSING REGULAR UPPER BODY CLOTHING: A LITTLE
MOVING TO AND FROM BED TO CHAIR: A LITTLE
HELP NEEDED FOR BATHING: A LITTLE

## 2025-05-28 ASSESSMENT — PAIN - FUNCTIONAL ASSESSMENT
PAIN_FUNCTIONAL_ASSESSMENT: 0-10

## 2025-05-28 ASSESSMENT — PAIN SCALES - GENERAL
PAINLEVEL_OUTOF10: 0 - NO PAIN
PAINLEVEL_OUTOF10: 6
PAINLEVEL_OUTOF10: 3
PAINLEVEL_OUTOF10: 2
PAINLEVEL_OUTOF10: 5 - MODERATE PAIN
PAINLEVEL_OUTOF10: 8
PAINLEVEL_OUTOF10: 8
PAINLEVEL_OUTOF10: 4
PAINLEVEL_OUTOF10: 10 - WORST POSSIBLE PAIN

## 2025-05-28 ASSESSMENT — ENCOUNTER SYMPTOMS
VOMITING: 0
ABDOMINAL PAIN: 0
DIARRHEA: 0
DIZZINESS: 0
FEVER: 0
COUGH: 0
DYSPNEA ON EXERTION: 1
NAUSEA: 0
CHILLS: 0
JOINT SWELLING: 0
LIGHT-HEADEDNESS: 0
FOCAL WEAKNESS: 0
SHORTNESS OF BREATH: 1

## 2025-05-28 ASSESSMENT — PAIN DESCRIPTION - DESCRIPTORS
DESCRIPTORS: SORE
DESCRIPTORS: ACHING;SORE
DESCRIPTORS: DISCOMFORT
DESCRIPTORS: DISCOMFORT

## 2025-05-28 ASSESSMENT — PAIN DESCRIPTION - ORIENTATION
ORIENTATION: LEFT

## 2025-05-28 ASSESSMENT — PAIN DESCRIPTION - LOCATION
LOCATION: RIB CAGE
LOCATION: CHEST
LOCATION: INCISION
LOCATION: RIB CAGE

## 2025-05-28 ASSESSMENT — ACTIVITIES OF DAILY LIVING (ADL): ADL_ASSISTANCE: INDEPENDENT

## 2025-05-28 NOTE — PROGRESS NOTES
Occupational Therapy    Evaluation    Patient Name: Tuyet Ramirez  MRN: [id number]  Department: [facility]  Room: 183183A  Today's Date: 5/28/2025  Time Calculation  Start Time: 1204  Stop Time: 1222  Time Calculation (min): 18 min    Assessment  IP OT Assessment  OT Assessment: Pt. presents with a decline in self-care, mobility and safety and would benefit from skilled OT services to maximize independence and promote return to prior level of function.  Prognosis: Good  Barriers to Discharge Home: No anticipated barriers  End of Session Communication: Bedside nurse  End of Session Patient Position: Up in chair, Alarm off, not on at start of session  Plan:  Treatment Interventions: ADL retraining, Functional transfer training, Compensatory technique education  OT Frequency: 3 times per week  OT Discharge Recommendations: Low intensity level of continued care  OT Recommended Transfer Status: Assist of 1  OT - OK to Discharge: Yes (to next level of care when cleared by medical team)    Subjective   Current Problem:  1. Primary cancer of left upper lobe of lung (Multi)  Surgical Pathology Exam    Surgical Pathology Exam      2. Stroke, hemorrhagic (Multi)  Referral to Pulmonology        OT Visit Info:  OT Received On: 05/28/25  General Visit Info:  General  Reason for Referral: impaired adl; pt. admitted for robotic wedge resection 5/27/25  Referred By: Los  Past Medical History Relevant to Rehab: depression, copd, htn, colon ca s/p bowel resection, L upper lobe adenocarcinoma, smoker-quite 1 month ago  Co-Treatment: PT  Co-Treatment Reason: to maximize patient safety/abilities  Prior to Session Communication: Bedside nurse  Patient Position Received: Up in chair, Alarm off, not on at start of session  General Comment: pt. agreeable to therapy intervention  Precautions:  Precautions Comment: tele, per RN trial on room air:  on o2 2 L/min:  at rest 91%, with mobility 91%, room air trial:  at rest 88-89%, with mobility  81%     Date/Time Vitals Session Patient Position Pulse Resp SpO2 BP MAP (mmHg)    05/28/25 1400 --  --  73  18  93 %  135/60  87     05/28/25 1500 --  --  75  18  90 %  111/59  84                Pain:  Pain Assessment  Pain Assessment: 0-10  0-10 (Numeric) Pain Score:  (2-3/10 L chest tube site)    Objective   Cognition:  Overall Cognitive Status: Within Functional Limits           Home Living:  Home Living Comments: pt. lives with mom and step dad, 1 floor home, basement laundry, owns st. cane, wh. walker, walking stick, stall shower with gb, seat, hhs, st. toilet   Prior Function:  Prior Function Comments: pt. independent with adl, drives, ambulates without device  IADL History:     ADL:  ADL Comments: pt. able don/dof sock seated in chair, minimal discomfort L chest tube site  Activity Tolerance:  Endurance: Endurance does not limit participation in activity  Early Mobility/Exercise Safety Screen: Proceed with mobilization - No exclusion criteria met  Bed Mobility/Transfers:      Transfers  Transfer:  (sit<> stand from recliner chair:  sba)      Functional Mobility:  Functional Mobility  Functional Mobility Performed:  (mobility completed house hold distance without device, sba, see vitals above)  Sensation:  Sensation Comment: sensation intact  Strength:  Strength Comments: bue's at least 3/5 per observation  Coordination:  Movements are Fluid and Coordinated: Yes   Outcome Measures: [facility] Daily Activity  Putting on and taking off regular lower body clothing: A little  Bathing (including washing, rinsing, drying): A little  Putting on and taking off regular upper body clothing: A little  Toileting, which includes using toilet, bedpan or urinal: A little  Taking care of personal grooming such as brushing teeth: A little  Eating Meals: None  Daily Activity - Total Score: 19         and Early Mobility/Exercise Safety Screen: Proceed with mobilization - No exclusion criteria met  ICU Mobility Scale: Walking with  assistance of 1 person [8]  Education Documentation  Precautions, taught by Fawn Marlow OT at 5/28/2025  3:33 PM.  Learner: Patient  Readiness: Acceptance  Method: Explanation  Response: Verbalizes Understanding, Needs Reinforcement  Comment: OT POC    ADL Training, taught by Fawn Marlow OT at 5/28/2025  3:33 PM.  Learner: Patient  Readiness: Acceptance  Method: Explanation  Response: Verbalizes Understanding, Needs Reinforcement  Comment: OT POC        Goals:   Encounter Problems       Encounter Problems (Active)       OT Goals       Increase functional mobility and  functional transfers to supervision for bed/chair/toilet with dme prn   (Progressing)       Start:  05/28/25    Expected End:  06/04/25            increase bue ther ex/activity x 7-10 minutes and increase standing tolerance x 3-5 minutes with supervision to promote greater activity tolerance for assist with adl.   (Progressing)       Start:  05/28/25    Expected End:  06/04/25            Increase ub/lb dressing to supervision with dme prn  (Progressing)       Start:  05/28/25    Expected End:  06/04/25            Increase toileting to supervision with dme prn  (Progressing)       Start:  05/28/25    Expected End:  06/04/25            pt. to apply ec/ws techniques without cues to all mobility/transfer/adl to decrease fatigue/promote efficient use of energy toward completion of functional tasks.  (Progressing)       Start:  05/28/25    Expected End:  06/04/25
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
## 2025-05-28 NOTE — PROGRESS NOTES
[facility]   Cardiothoracic Surgery   Progress Note        Tuyet Ramirez is a 69 y.o. female on day 1 of admission presenting with Primary cancer of left upper lobe of lung (Multi).    Subjective     Interval HPI: Patient assessed this AM while in bed and again while walking in the hallway. Reports sleeping well overnight, pain well controlled. SOB with exertion which she said will improve with chest tube removal. Patient seen with O2 saturations into the 70s while walking on room air.      Review of Systems   Constitutional: Positive for malaise/fatigue. Negative for chills and fever.   Cardiovascular:  Positive for dyspnea on exertion. Negative for chest pain.   Respiratory:  Positive for shortness of breath. Negative for cough.    Skin:  Negative for itching.   Musculoskeletal:  Negative for joint swelling.   Gastrointestinal:  Negative for abdominal pain, diarrhea, nausea and vomiting.   Neurological:  Negative for dizziness, focal weakness and light-headedness.         Objective   Physical Exam  Physical Exam  Constitutional:       General: She is not in acute distress.     Appearance: Normal appearance. She is not ill-appearing.   Cardiovascular:      Rate and Rhythm: Normal rate and regular rhythm.      Pulses: Normal pulses.   Pulmonary:      Effort: Pulmonary effort is normal. No respiratory distress.      Breath sounds: No wheezing.   Abdominal:      General: There is no distension.      Palpations: Abdomen is soft.      Tenderness: There is no abdominal tenderness.   Musculoskeletal:         General: No swelling or tenderness.   Skin:     General: Skin is warm and dry.      Capillary Refill: Capillary refill takes less than 2 seconds.   Neurological:      General: No focal deficit present.      Mental Status: She is alert and oriented to person, place, and time. Mental status is at baseline.         Last Recorded Vitals  Vitals:    05/28/25 0900 05/28/25 1000 05/28/25 1100  05/28/25 1200   BP: 119/66  111/52 114/54   BP Location:       Patient Position:       Pulse: 76 78 71 74   Resp: 21 22 19 21   Temp:       TempSrc:       SpO2: 94% 92% 94% 93%   Weight:       Height:            Intake/Output last 3 Shifts:  I/O last 3 completed shifts:  In: 1450 (20.1 mL/kg) [I.V.:1400 (19.4 mL/kg); IV Piggyback:50]  Out: 114 (1.6 mL/kg) [Blood:5; Chest Tube:109]  Weight: 72 kg     Inpatient Medications  Scheduled Medications[1]  Continuous medications  Continuous Medications[2]  PRN medications  PRN Medications[3]     LDA:       Relevant Results  Lab Review  Results from last 7 days   Lab Units 05/28/25  0447   WBC AUTO x10*3/uL 8.3   HEMOGLOBIN g/dL 11.4*   HEMATOCRIT % 35.5*   PLATELETS AUTO x10*3/uL 197     Results from last 7 days   Lab Units 05/28/25  0447   SODIUM mmol/L 137   POTASSIUM mmol/L 3.9   CHLORIDE mmol/L 104   CO2 mmol/L 26   BUN mg/dL 10   CREATININE mg/dL 0.65   CALCIUM mg/dL 8.4*   GLUCOSE mg/dL 120*     Results from last 7 days   Lab Units 05/28/25  0447   MAGNESIUM mg/dL 2.05             Radiographic Testing  EKG:  ECG 12 lead 05/27/2025 (Preliminary)  Normal sinus rhythm  Nonspecific T wave abnormality  Abnormal ECG  No previous ECGs available    Echo:  Transthoracic Echo (TTE) Complete 11/11/2024  PHYSICIAN INTERPRETATION:  Left Ventricle: The left ventricular systolic function is normal, with a visually estimated ejection fraction of 55-60%. There are no regional left ventricular wall motion abnormalities. The left ventricular cavity size is normal. There is mildly increased septal and normal posterior left ventricular wall thickness. Spectral Doppler shows a normal pattern of left ventricular diastolic filling.  Left Atrium: The left atrium is normal in size. Left atrial appendage was not assessed.  Right Ventricle: The right ventricle is normal in size. There is normal right ventricular global systolic function.  Right Atrium: The right atrium is normal in size.  Aortic  Valve: The aortic valve is structurally normal. The aortic valve dimensionless index is 0.82. There is no evidence of aortic valve regurgitation. The peak instantaneous gradient of the aortic valve is 5 mmHg. The mean gradient of the aortic valve is 3 mmHg.  Mitral Valve: The mitral valve is normal in structure. There is trace mitral valve regurgitation.  Tricuspid Valve: The tricuspid valve is structurally normal. There is trace tricuspid regurgitation. The right ventricular systolic pressure is unable to be estimated.  Pulmonic Valve: The pulmonic valve is not well visualized. The pulmonic valve regurgitation was not well visualized.  Pericardium: There is no pericardial effusion noted.  Aorta: The aortic root is normal.        CONCLUSIONS:   1. The left ventricular systolic function is normal, with a visually estimated ejection fraction of 55-60%.       Ejection Fractions:  EF   Date/Time Value Ref Range Status   11/11/2024 05:45 PM 58 %      Cath:  No results found for this or any previous visit from the past 1095 days.    Stress Test:  No results found for this or any previous visit from the past 1095 days.    Cardiac Imaging:  No results found for this or any previous visit from the past 1095 days.    Chest Imaging:  XR chest 1 view   Final Result   1.  Improvement of left lung atelectasis. Persistent right basilar   atelectasis.        Signed by: Jeb Walker 5/28/2025 9:12 AM   Dictation workstation:   [id number]      XR chest 1 view   Final Result   1.  As above.        Signed by: Jeb Walker 5/27/2025 11:38 AM   Dictation workstation:   [id number]      XR chest 1 view    (Results Pending)           History Of Present Illness  Tuyet Ramirez is a 69 y.o. female with a PMH significant for left upper lobe adenocarcinoma, Colon cancer s/p bowel resection, COPD, HTN, and depression  who presents to [facility] on 5/27/2025 for planned Left lung wedge resection with   "Parag.    Daily Progress Note     5/27/2025: Patient arrived to unit from PACU in stable condition. She is on 3L NC. Only complaint at this time is pain. She is scheduled tylenol, mag-ox, robaxin, and lidocaine patches. PRN Oxy and fentanyl. Plan to give one time dose of dilaudid. Minimal chest tube output. Chest tube with small intermittent airleak present; maintain to waterseal and follow up CXR in morning.      - Initial Chest Tube Output: 40mL  -Current O2 Requirements: 3L NC     05/28/2025: NAEON. Restarted home carvedilol. On 2L NC this AM. Patient walked with nursing this AM on room air and oxygen saturations were 73-80%, patient placed back on 2L NC. Chest tube removed. Tentative plan to discharge today. Plan to repeat CXR at noon and trial patient walking again on room air. If she again desaturates < 88%, may need home O2 (which patient adamantly does not want) or keep overnight and continue weaning oxygen.    -Current O2 requirement: 2L NC     Assessment & Plan        Left Upper Lobe Lung Nodule  - CT scan of the chest that noted acute rib fractures. Concurrently they noted a 1.4 cm left upper lobe lung nodule along with \"several tiny granuloma seen within the lungs\", which appear grossly stable. She received follow-up imaging with a PET scan on 3/3/2025, which showed mild FDG avidity with a left upper lobe 1.5 cm lesion (SUV 2.7)   - CT biopsy on 3/31/25.  Pathology showed Adenocarcinoma, acinar pattern   - S/p robotic left lung wedge resection and lymph node resection on 5/27/2025   with Thoracic Surgeon, Dr. Josh Tuttle   - Chest tube removed this AM  - Follow up CXR at noon  - Follow up surgical pathology           Acute Respiratory Insufficiency   - As anticipated in the immediate post operative period   - Current O2 Requirements: 2 L NC  - IS/Deep Breathing Exercises  - Keep SpO2 > 88% given COPD history  - Wean supplemental O2 as tolerable, may need home O2        Risk for Post-Operative " Arrythmia   - Given thoracic surgery, patient is at risk for development of atrial fibrilllation   - Patient started on IV metoprolol yesterday for risk of a fib with plan to continue for 30 days  --> Will transition back to PO home carvedilol today, 12.5mg daily  - Daily labs, replace electrolytes as needed        Acute Post-Op Pain  - As expected   - Multimodal pain control   --> Scheduled: Acetaminophen, magnesium oxide 400mg QD, gabapentin 100mg TID, methocarbamol 500mg q6h   --> PRN: oxycodone  - Bowel regimen while taking narcotics         Essential HTN  - Home Medication: Carvedilol 12.5mg Daily, Amlodipine 10mg daily  - Continue amlodipine, restarted home carvedilol today           Hyperlipidemia  - Home Medication: Rosuvastatin 10mg Daily  - Continue statin therapy as above     Depression  - Continue Zoloft     Risk for Electrolyte Disturbances  - Optimize electrolytes per [facility] protocol       Bowel Regimen  - Senna-S BID, miralax  - PRN suppositories      Prophylaxis  - GI: PPI  - DVT: Brent-Hose & subq heparin  - PT/OT eval-once clinically stable      Disposition  - CVICU (stepdown status)       Above patient and plan discussed with thoracic surgeon, Dr. Stella Tuttle.       Agnes Ruffin, APRN-CNP          [1] amLODIPine, 10 mg, oral, Daily  carvedilol, 12.5 mg, oral, Daily  tiotropium, 2 puff, inhalation, Daily   And  formoterol, 20 mcg, nebulization, q12h  heparin (porcine), 5,000 Units, subcutaneous, q8h  lidocaine, 1 patch, transdermal, Daily  magnesium oxide, 400 mg of magnesium oxide, oral, Daily  methocarbamol, 500 mg, oral, q8h TOBIAS  pantoprazole, 40 mg, oral, Daily before breakfast  polyethylene glycol, 17 g, oral, Daily  rosuvastatin, 10 mg, oral, Daily  sennosides-docusate sodium, 2 tablet, oral, BID  sertraline, 50 mg, oral, Daily  [2]    [3] PRN medications: albuterol, bisacodyl, bisacodyl, fentaNYL, guaiFENesin, hydrOXYzine HCL, naloxone, ondansetron **OR** ondansetron, oxyCODONE,  oxyCODONE, oxygen

## 2025-05-28 NOTE — PROGRESS NOTES
05/28/25 1002   Discharge Planning   Living Arrangements Parent  (independent 89 yo mother)   Support Systems Parent;Family members;Children   Assistance Needed independent and driving   Type of Residence Private residence   Number of Stairs to Enter Residence 3   Number of Stairs Within Residence 12   Do you have animals or pets at home? No   Home or Post Acute Services None   Expected Discharge Disposition Home   Does the patient need discharge transport arranged? Yes  (may need Uber depending on daughter's availability to transport patient home at discharge)     Record reviewed.  POD #1 R-A Lt Lung Wedge Resection, w/LND.  This TCC met with patient at bedside, introduced self and explained role.  Demographic information and insurance verified.  Patient is from home with 89 yo mother who is independent and driving.  Denies SW needs at this time.  Patient plans to return home at discharge, no needs.  She stated she does not want to go home on O2.  May need transportation arranged pending daughter's availability when ready for discharge.  Care Transitions will continue to follow.    1:29 pm addendum  This TCC received updated from MARISA-CNP, patient may need home O2 at discharge.  ADOD tomorrow.  TCC placed referral to Nahed Martin, with DME.  MARISA-CNP updated.  Care Transitions will continue to follow.

## 2025-05-28 NOTE — PROGRESS NOTES
Tuyet Ramirez is a 69 y.o. female on day 1 of admission presenting with Primary cancer of left upper lobe of lung (Multi).      Subjective   Patient seen and examined at bedside.  Patient is on 2 3 L nasal cannula oxygen.  She is using incentive spirometry well.  She is not in acute respiratory distress.  She feels well overall.  She is eating and drinking.  She has not moved her bowels.       Objective     Last Recorded Vitals  /60   Pulse 88   Temp 37.4 °C (99.3 °F) (Temporal)   Resp 24   Wt 72 kg (158 lb 11.7 oz)   SpO2 93%   Intake/Output last 3 Shifts:    Intake/Output Summary (Last 24 hours) at 5/28/2025 1331  Last data filed at 5/28/2025 0800  Gross per 24 hour   Intake --   Output 74 ml   Net -74 ml       Admission Weight  Weight: 70.7 kg (155 lb 13.8 oz) (05/15/25 0926)    Daily Weight  05/27/25 : 72 kg (158 lb 11.7 oz)    Image Results  XR chest 1 view  Narrative: Interpreted By:  Jeb Walker,   STUDY:  XR CHEST 1 VIEW;  5/28/2025 7:14 am      INDICATION:  Signs/Symptoms:Post-op, Chest tube follow up.      COMPARISON:  05/27/2025      ACCESSION NUMBER(S):  [id number]      ORDERING CLINICIAN:  ALLEN AMAYA      FINDINGS:  CARDIOMEDIASTINAL SILHOUETTE AND VASCULATURE:      Cardiac size:  Within normal limits.  Aortic shadow:  Within normal limits.      Mediastinal contours: Within normal limits.      Pulmonary vasculature:  The central vasculature is unremarkable      LUNGS:  A left chest tube extends to the apex, without pneumothorax. There  has been improvement of left upper lobe infiltrate or atelectasis, as  well as discoid atelectasis at the left base laterally. There is  persistent right basilar discoid atelectasis.      ABDOMEN AND OTHER FINDINGS:  No remarkable upper abdominal findings.      BONES:  No acute osseous changes.      Impression: 1.  Improvement of left lung atelectasis. Persistent right basilar  atelectasis.      Signed by: Jeb Walker 5/28/2025 9:12 AM  Dictation  workstation:   [id number]  ECG 12 lead  Normal sinus rhythm  Nonspecific T wave abnormality  Abnormal ECG  No previous ECGs available      Physical Exam  General:  Pleasant and cooperative.  Mild distress from breathing.  Overall comfortable.  HEENT:  Normocephalic, atraumatic, mucus membranes moist.   Neck:  Trachea midline.  No JVD.    Chest: Mildly diminished breath sounds bilaterally.  No crackles.  No wheezing.  CV:  Regular rate and rhythm.  Positive S1/S2.   Abdomen: Bowel sounds present in all four quadrants, abdomen is soft, non-tender, non-distended.  Extremities:  No lower extremity edema or cyanosis.   Neurological:  AAOx3. No focal deficits.  Skin:  Warm and dry.     Relevant Results  All labs and imaging reviewed by myself.            Assessment & Plan    Neuro:   # Depression  - Continue home sertraline  -ANO x 3  - ICU delirium precautions  -Multimodal pain control  - No acute issues     CV:  # HTN  -Continue home Norvasc, Crestor  - Patient has normal EF on previous echo     Respiratory:  # BRIGHT adenocarcinoma s/p robotic wedge resection  # Postop Respiratory insufficiency  # COPD  -Currently on 2 L nasal cannula oxygen.  Need to wean to room air if able.  Patient may need oxygen at home.  Oxygen consultant has been contacted by cardiac surgery team..  Target sat 88-92 as patient has COPD.  - Aggressive bronchopulmonary hygiene, use of incentive spirometry, Mucinex  - Chest tube output adequate.  Cardiac surgery team will remove chest tube today.     GI:  # Colon cancer s/p bowel resection  -Regular diet  - No acute issues     Endo:  -Monitor blood sugars  -No acute issues     Renal:  -Renal function normal.  Monitor urine output.  - Electrolytes normal  - No acute issues     Hematological:  -Hemoglobin normal.  - No acute issue     ID:  -Patient afebrile.  No concerns of infection.  Patient received Ancef in the immediate postop period     Vent/O2: Nasal cannula  Lines/Devices: Peripheral  Drips:  None  ATBx: None  Fluids: None  Diet: Regular  PPX: Protonix  Code status: Full code  Dispo: ICU      Judith Griffin MD  PGY 2 Critical Care

## 2025-05-29 ENCOUNTER — APPOINTMENT (OUTPATIENT)
Dept: RADIOLOGY | Facility: HOSPITAL | Age: 70
End: 2025-05-29
Payer: MEDICARE

## 2025-05-29 ENCOUNTER — DOCUMENTATION (OUTPATIENT)
Dept: HOME HEALTH SERVICES | Facility: HOME HEALTH | Age: 70
End: 2025-05-29
Payer: MEDICARE

## 2025-05-29 ENCOUNTER — HOME HEALTH ADMISSION (OUTPATIENT)
Dept: HOME HEALTH SERVICES | Facility: HOME HEALTH | Age: 70
End: 2025-05-29
Payer: MEDICARE

## 2025-05-29 ENCOUNTER — PHARMACY VISIT (OUTPATIENT)
Dept: PHARMACY | Facility: CLINIC | Age: 70
End: 2025-05-29
Payer: COMMERCIAL

## 2025-05-29 VITALS
SYSTOLIC BLOOD PRESSURE: 119 MMHG | HEIGHT: 64 IN | HEART RATE: 74 BPM | OXYGEN SATURATION: 87 % | WEIGHT: 158.95 LBS | BODY MASS INDEX: 27.14 KG/M2 | TEMPERATURE: 44.2 F | RESPIRATION RATE: 28 BRPM | DIASTOLIC BLOOD PRESSURE: 65 MMHG

## 2025-05-29 LAB
ANION GAP SERPL CALC-SCNC: 12 MMOL/L (ref 10–20)
ATRIAL RATE: 76 BPM
ATRIAL RATE: 83 BPM
BUN SERPL-MCNC: 9 MG/DL (ref 6–23)
CALCIUM SERPL-MCNC: 8.8 MG/DL (ref 8.6–10.3)
CHLORIDE SERPL-SCNC: 103 MMOL/L (ref 98–107)
CO2 SERPL-SCNC: 27 MMOL/L (ref 21–32)
CREAT SERPL-MCNC: 0.8 MG/DL (ref 0.5–1.05)
EGFRCR SERPLBLD CKD-EPI 2021: 81 ML/MIN/1.73M*2
ERYTHROCYTE [DISTWIDTH] IN BLOOD BY AUTOMATED COUNT: 12.8 % (ref 11.5–14.5)
GLUCOSE SERPL-MCNC: 115 MG/DL (ref 74–99)
HCT VFR BLD AUTO: 35.8 % (ref 36–46)
HGB BLD-MCNC: 11.5 G/DL (ref 12–16)
MAGNESIUM SERPL-MCNC: 1.93 MG/DL (ref 1.6–2.4)
MCH RBC QN AUTO: 31.1 PG (ref 26–34)
MCHC RBC AUTO-ENTMCNC: 32.1 G/DL (ref 32–36)
MCV RBC AUTO: 97 FL (ref 80–100)
NRBC BLD-RTO: 0 /100 WBCS (ref 0–0)
P AXIS: 54 DEGREES
P AXIS: 65 DEGREES
P OFFSET: 194 MS
P OFFSET: 214 MS
P ONSET: 138 MS
P ONSET: 157 MS
PLATELET # BLD AUTO: 165 X10*3/UL (ref 150–450)
POTASSIUM SERPL-SCNC: 3.9 MMOL/L (ref 3.5–5.3)
PR INTERVAL: 144 MS
PR INTERVAL: 148 MS
Q ONSET: 212 MS
Q ONSET: 229 MS
QRS COUNT: 12 BEATS
QRS COUNT: 13 BEATS
QRS DURATION: 70 MS
QRS DURATION: 76 MS
QT INTERVAL: 406 MS
QT INTERVAL: 410 MS
QTC CALCULATION(BAZETT): 461 MS
QTC CALCULATION(BAZETT): 477 MS
QTC FREDERICIA: 443 MS
QTC FREDERICIA: 452 MS
R AXIS: -14 DEGREES
R AXIS: -21 DEGREES
RBC # BLD AUTO: 3.7 X10*6/UL (ref 4–5.2)
SODIUM SERPL-SCNC: 138 MMOL/L (ref 136–145)
T AXIS: 32 DEGREES
T AXIS: 8 DEGREES
T OFFSET: 415 MS
T OFFSET: 434 MS
VENTRICULAR RATE: 76 BPM
VENTRICULAR RATE: 83 BPM
WBC # BLD AUTO: 6.7 X10*3/UL (ref 4.4–11.3)

## 2025-05-29 PROCEDURE — 71045 X-RAY EXAM CHEST 1 VIEW: CPT | Performed by: STUDENT IN AN ORGANIZED HEALTH CARE EDUCATION/TRAINING PROGRAM

## 2025-05-29 PROCEDURE — 99238 HOSP IP/OBS DSCHRG MGMT 30/<: CPT

## 2025-05-29 PROCEDURE — 2500000001 HC RX 250 WO HCPCS SELF ADMINISTERED DRUGS (ALT 637 FOR MEDICARE OP)

## 2025-05-29 PROCEDURE — 94640 AIRWAY INHALATION TREATMENT: CPT

## 2025-05-29 PROCEDURE — 74018 RADEX ABDOMEN 1 VIEW: CPT

## 2025-05-29 PROCEDURE — 2500000002 HC RX 250 W HCPCS SELF ADMINISTERED DRUGS (ALT 637 FOR MEDICARE OP, ALT 636 FOR OP/ED)

## 2025-05-29 PROCEDURE — 71045 X-RAY EXAM CHEST 1 VIEW: CPT

## 2025-05-29 PROCEDURE — 36415 COLL VENOUS BLD VENIPUNCTURE: CPT

## 2025-05-29 PROCEDURE — 85027 COMPLETE CBC AUTOMATED: CPT

## 2025-05-29 PROCEDURE — 83735 ASSAY OF MAGNESIUM: CPT

## 2025-05-29 PROCEDURE — 80048 BASIC METABOLIC PNL TOTAL CA: CPT

## 2025-05-29 PROCEDURE — 74018 RADEX ABDOMEN 1 VIEW: CPT | Performed by: STUDENT IN AN ORGANIZED HEALTH CARE EDUCATION/TRAINING PROGRAM

## 2025-05-29 PROCEDURE — RXMED WILLOW AMBULATORY MEDICATION CHARGE

## 2025-05-29 PROCEDURE — 2500000004 HC RX 250 GENERAL PHARMACY W/ HCPCS (ALT 636 FOR OP/ED)

## 2025-05-29 RX ORDER — ADHESIVE BANDAGE
30 BANDAGE TOPICAL ONCE
Status: COMPLETED | OUTPATIENT
Start: 2025-05-29 | End: 2025-05-29

## 2025-05-29 RX ORDER — LANOLIN ALCOHOL/MO/W.PET/CERES
400 CREAM (GRAM) TOPICAL DAILY
Qty: 10 TABLET | Refills: 0 | Status: SHIPPED | OUTPATIENT
Start: 2025-05-30

## 2025-05-29 RX ORDER — AMOXICILLIN 250 MG
2 CAPSULE ORAL 2 TIMES DAILY
Qty: 10 TABLET | Refills: 0 | Status: SHIPPED | OUTPATIENT
Start: 2025-05-29

## 2025-05-29 RX ORDER — METHOCARBAMOL 500 MG/1
500 TABLET, FILM COATED ORAL EVERY 8 HOURS SCHEDULED
Qty: 20 TABLET | Refills: 0 | Status: SHIPPED | OUTPATIENT
Start: 2025-05-29

## 2025-05-29 RX ORDER — OXYCODONE HYDROCHLORIDE 5 MG/1
5 TABLET ORAL EVERY 6 HOURS PRN
Qty: 15 TABLET | Refills: 0 | Status: SHIPPED | OUTPATIENT
Start: 2025-05-29 | End: 2025-06-05

## 2025-05-29 RX ADMIN — AMLODIPINE BESYLATE 10 MG: 10 TABLET ORAL at 08:26

## 2025-05-29 RX ADMIN — METHOCARBAMOL 500 MG: 500 TABLET ORAL at 05:51

## 2025-05-29 RX ADMIN — SERTRALINE HYDROCHLORIDE 50 MG: 50 TABLET ORAL at 08:26

## 2025-05-29 RX ADMIN — TIOTROPIUM BROMIDE INHALATION SPRAY 2 PUFF: 3.12 SPRAY, METERED RESPIRATORY (INHALATION) at 08:03

## 2025-05-29 RX ADMIN — MAGNESIUM OXIDE TAB 400 MG (241.3 MG ELEMENTAL MG) 1 TABLET: 400 (241.3 MG) TAB at 08:26

## 2025-05-29 RX ADMIN — ROSUVASTATIN CALCIUM 10 MG: 10 TABLET, FILM COATED ORAL at 08:26

## 2025-05-29 RX ADMIN — CARVEDILOL 12.5 MG: 12.5 TABLET, FILM COATED ORAL at 08:26

## 2025-05-29 RX ADMIN — POLYETHYLENE GLYCOL 3350 17 G: 17 POWDER, FOR SOLUTION ORAL at 08:26

## 2025-05-29 RX ADMIN — SENNOSIDES AND DOCUSATE SODIUM 2 TABLET: 50; 8.6 TABLET ORAL at 08:26

## 2025-05-29 RX ADMIN — MAGNESIUM HYDROXIDE 30 ML: 400 SUSPENSION ORAL at 09:07

## 2025-05-29 RX ADMIN — HEPARIN SODIUM 5000 UNITS: 5000 INJECTION INTRAVENOUS; SUBCUTANEOUS at 05:51

## 2025-05-29 RX ADMIN — PANTOPRAZOLE SODIUM 40 MG: 40 TABLET, DELAYED RELEASE ORAL at 06:10

## 2025-05-29 RX ADMIN — OXYCODONE HYDROCHLORIDE 10 MG: 5 TABLET ORAL at 02:14

## 2025-05-29 RX ADMIN — FORMOTEROL FUMARATE DIHYDRATE 20 MCG: 20 SOLUTION RESPIRATORY (INHALATION) at 08:04

## 2025-05-29 RX ADMIN — OXYCODONE HYDROCHLORIDE 10 MG: 5 TABLET ORAL at 08:25

## 2025-05-29 ASSESSMENT — COGNITIVE AND FUNCTIONAL STATUS - GENERAL
DAILY ACTIVITIY SCORE: 24
MOBILITY SCORE: 24

## 2025-05-29 ASSESSMENT — ENCOUNTER SYMPTOMS
DYSPNEA ON EXERTION: 1
SHORTNESS OF BREATH: 1
CHILLS: 0
COUGH: 0
JOINT SWELLING: 0
VOMITING: 0
FEVER: 0
NAUSEA: 0
DIZZINESS: 0
FOCAL WEAKNESS: 0
DIARRHEA: 0
LIGHT-HEADEDNESS: 0
ABDOMINAL PAIN: 0

## 2025-05-29 ASSESSMENT — PAIN DESCRIPTION - DESCRIPTORS
DESCRIPTORS: DISCOMFORT

## 2025-05-29 ASSESSMENT — PAIN - FUNCTIONAL ASSESSMENT
PAIN_FUNCTIONAL_ASSESSMENT: 0-10

## 2025-05-29 ASSESSMENT — PAIN DESCRIPTION - LOCATION
LOCATION: CHEST
LOCATION: INCISION

## 2025-05-29 ASSESSMENT — PAIN SCALES - GENERAL
PAINLEVEL_OUTOF10: 5 - MODERATE PAIN
PAINLEVEL_OUTOF10: 7
PAINLEVEL_OUTOF10: 8
PAINLEVEL_OUTOF10: 4
PAINLEVEL_OUTOF10: 7
PAINLEVEL_OUTOF10: 6

## 2025-05-29 ASSESSMENT — PAIN DESCRIPTION - ORIENTATION: ORIENTATION: LEFT

## 2025-05-29 NOTE — DISCHARGE SUMMARY
Discharge Diagnosis  Primary cancer of left upper lobe of lung (Multi)    Issues Requiring Follow-Up  Follow up with pulmonary and thoracic surgery  Re-evaluate for need for home O2  Follow up surgical pathology    Test Results Pending At Discharge  Pending Labs       Order Current Status    Surgical Pathology Exam In process            Hospital Course     History Of Present Illness  Tuyet Ramirez is a 69 y.o. female with a PMH significant for left upper lobe adenocarcinoma, Colon cancer s/p bowel resection, COPD, HTN, and depression  who presents to [facility] on 5/27/2025 for planned Left lung wedge resection with Dr. Tuttle.     Daily Progress Note     5/27/2025: Patient arrived to unit from PACU in stable condition. She is on 3L NC. Only complaint at this time is pain. She is scheduled tylenol, mag-ox, robaxin, and lidocaine patches. PRN Oxy and fentanyl. Plan to give one time dose of dilaudid. Minimal chest tube output. Chest tube with small intermittent airleak present; maintain to waterseal and follow up CXR in morning.      - Initial Chest Tube Output: 40mL  -Current O2 Requirements: 3L NC     05/28/2025: NAEON. Restarted home carvedilol. On 2L NC this AM. Patient walked with nursing this AM on room air and oxygen saturations were 73-80%, patient placed back on 2L NC. Chest tube removed. Tentative plan to discharge today. Plan to repeat CXR at noon and trial patient walking again on room air. If she again desaturates < 88%, may need home O2 (which patient adamantly does not want) or keep overnight and continue weaning oxygen.     -Current O2 requirement: 2L NC     05/29/2025: No acute events overnight. Continues on 2L home O2 this AM; although, slight improvement in oxygen while ambulating. Plan to discharge patient today with home O2.    -Current O2 requirement: 2L NC  Assessment & Plan        Left Upper Lobe Lung Nodule  - CT scan of the chest that noted acute rib fractures.  "Concurrently they noted a 1.4 cm left upper lobe lung nodule along with \"several tiny granuloma seen within the lungs\", which appear grossly stable. She received follow-up imaging with a PET scan on 3/3/2025, which showed mild FDG avidity with a left upper lobe 1.5 cm lesion (SUV 2.7)   - CT biopsy on 3/31/25.  Pathology showed Adenocarcinoma, acinar pattern   - S/p robotic left lung wedge resection and lymph node resection on 5/27/2025   with Thoracic Surgeon, Dr. Josh Tuttle   - Follow up surgical pathology           Acute Respiratory Insufficiency   - As anticipated in the immediate post operative period   - Current O2 Requirements: 2 L NC  - IS/Deep Breathing Exercises  - Keep SpO2 > 88% given COPD history  - Wean supplemental O2 as tolerable, will discharge home with temporary home O2        Risk for Post-Operative Arrythmia   - Given thoracic surgery, patient is at risk for development of atrial fibrilllation   - Patient started on IV metoprolol yesterday for risk of a fib with plan to continue for 30 days  --> Restarted home carvedilol yesterday, 12.5mg daily  - Daily labs, replace electrolytes as needed        Acute Post-Op Pain  - As expected   - Multimodal pain control   --> Scheduled: Acetaminophen, magnesium oxide 400mg QD, gabapentin 100mg TID, methocarbamol 500mg q6h   --> PRN: oxycodone  - Bowel regimen while taking narcotics         Essential HTN  - Home Medication: Carvedilol 12.5mg Daily, Amlodipine 10mg daily  - Continue amlodipine, carvedilol           Hyperlipidemia  - Home Medication: Rosuvastatin 10mg Daily  - Continue statin therapy as above     Depression  - Continue Zoloft    Pertinent Physical Exam At Time of Discharge  Physical Exam  Vitals and nursing note reviewed.   Constitutional:       General: She is not in acute distress.     Appearance: Normal appearance. She is not ill-appearing.   Cardiovascular:      Rate and Rhythm: Normal rate and regular rhythm.      Pulses: Normal " pulses.   Pulmonary:      Effort: No respiratory distress.      Comments: On 2L NC  Abdominal:      General: There is no distension.      Palpations: Abdomen is soft.      Tenderness: There is no abdominal tenderness.   Musculoskeletal:         General: No swelling or tenderness. Normal range of motion.   Skin:     General: Skin is warm and dry.      Capillary Refill: Capillary refill takes less than 2 seconds.   Neurological:      General: No focal deficit present.      Mental Status: She is alert and oriented to person, place, and time. Mental status is at baseline.         Home Medications     Medication List      START taking these medications     magnesium oxide 400 mg (241.3 mg elemental) tablet; Commonly known as:   Mag-Ox; Take 1 tablet by mouth once daily.; Start taking on: May 30, 2025   methocarbamol 500 mg tablet; Commonly known as: Robaxin; Take 1 tablet   (500 mg) by mouth every 8 hours.   oxyCODONE 5 mg immediate release tablet; Commonly known as: Roxicodone;   Take 1 tablet (5 mg) by mouth every 6 hours if needed for moderate pain (4   - 6) for up to 7 days.   sennosides-docusate sodium 8.6-50 mg tablet; Commonly known as:   Phyllis-Colace; Take 2 tablets by mouth 2 times a day.     CONTINUE taking these medications     albuterol 90 mcg/actuation inhaler   alendronate 70 mg tablet; Commonly known as: Fosamax   amLODIPine 10 mg tablet; Commonly known as: Norvasc   carvedilol 12.5 mg tablet; Commonly known as: Coreg   hydrOXYzine HCL 25 mg tablet; Commonly known as: Atarax   omeprazole 40 mg DR capsule; Commonly known as: PriLOSEC; TAKE 1 TABLET   BY MOUTH 30-60 MINUTES BEFORE BREAKFAST.   rosuvastatin 10 mg tablet; Commonly known as: Crestor   sertraline 50 mg tablet; Commonly known as: Zoloft   Stiolto Respimat 2.5-2.5 mcg/actuation mist inhaler; Generic drug:   tiotropium-olodateroL   triamcinolone 0.5 % cream; Commonly known as: Kenalog     STOP taking these medications     gabapentin 300 mg capsule;  Commonly known as: Neurontin   ibuprofen 800 mg tablet   lidocaine 4 % patch   naloxone 4 mg/0.1 mL nasal spray; Commonly known as: Narcan       Outpatient Follow-Up  Future Appointments   Date Time Provider Department Center   6/11/2025  3:30 PM Josh Tuttle MD [id number]       Agnes Ruffin, APRN-CNP

## 2025-05-29 NOTE — HH CARE COORDINATION
Home Care received a Referral for Nursing, Physical Therapy, and Occupational Therapy. We have processed the referral for a Start of Care on 5/30-5/31.     If you have any questions or concerns, please feel free to contact us at [phone]. Follow the prompts, enter your five digit zip code, and you will be directed to your care team on CENTL 3.

## 2025-05-29 NOTE — CARE PLAN
Problem: Pain  Goal: Takes deep breaths with improved pain control throughout the shift  Outcome: Progressing  Goal: Turns in bed with improved pain control throughout the shift  Outcome: Progressing  Goal: Walks with improved pain control throughout the shift  Outcome: Progressing  Goal: Performs ADL's with improved pain control throughout shift  Outcome: Progressing     Problem: Pain - Adult  Goal: Verbalizes/displays adequate comfort level or baseline comfort level  Outcome: Progressing     Problem: Safety - Adult  Goal: Free from fall injury  Outcome: Progressing     Problem: Discharge Planning  Goal: Discharge to home or other facility with appropriate resources  Outcome: Progressing     Problem: Chronic Conditions and Co-morbidities  Goal: Patient's chronic conditions and co-morbidity symptoms are monitored and maintained or improved  Outcome: Progressing     Problem: Nutrition  Goal: Nutrient intake appropriate for maintaining nutritional needs  Outcome: Progressing     Problem: Fall/Injury  Goal: Not fall by end of shift  Outcome: Progressing  Goal: Be free from injury by end of the shift  Outcome: Progressing  Goal: Verbalize understanding of personal risk factors for fall in the hospital  Outcome: Progressing  Goal: Verbalize understanding of risk factor reduction measures to prevent injury from fall in the home  Outcome: Progressing  Goal: Use assistive devices by end of the shift  Outcome: Progressing  Goal: Pace activities to prevent fatigue by end of the shift  Outcome: Progressing     Problem: Respiratory  Goal: Minimal/no exertional discomfort or dyspnea this shift  Outcome: Progressing  Goal: No signs of respiratory distress (eg. Use of accessory muscles. Peds grunting)  Outcome: Progressing  Goal: Verbalize decreased shortness of breath this shift  Outcome: Progressing  Goal: Wean oxygen to maintain O2 saturation per order/standard this shift  Outcome: Progressing     Problem: Pain  Goal:  Participates in PT with improved pain control throughout the shift  Outcome: Met  Goal: Free from opioid side effects throughout the shift  Outcome: Met  Goal: Free from acute confusion related to pain meds throughout the shift  Outcome: Met     Problem: Respiratory  Goal: Clear secretions with interventions this shift  Outcome: Met  Goal: Minimize anxiety/maximize coping throughout shift  Outcome: Met  Goal: Patent airway maintained this shift  Outcome: Met  Goal: Tolerate mechanical ventilation evidenced by VS/agitation level this shift  Outcome: Met  Goal: Tolerate pulmonary toileting this shift  Outcome: Met  Goal: Increase self care and/or family involvement in next 24 hours  Outcome: Met   The patient's goals for the shift include      The clinical goals for the shift include pt will remain HDS throughout [facility].    Over the shift, the patient did not make progress toward the following goals. Barriers to progression include. Recommendations to address these barriers include.

## 2025-05-29 NOTE — NURSING NOTE
1100: all discharge instructions. Wound care and medications discussed with patient.    1130: Meds to beds at bedside speaking with patient.    1140: Patient discharged home with all belongings, medications and oxygen. No complaints or needs at this time.

## 2025-05-29 NOTE — PROGRESS NOTES
[facility]   Cardiothoracic Surgery   Progress Note        Tuyet Ramirez is a 69 y.o. female on day 2 of admission presenting with Primary cancer of left upper lobe of lung (Multi).    Subjective     Interval HPI: Patient seen this morning while sitting in the bedside chair. She walked this morning with nursing and continued to have low oxygen saturations while ambulating; although, slightly improved since yesterday. Minimal SOB and coughing with exertion. Otherwise, denies pain and slept well overnight. Excited about going home today.      Review of Systems   Constitutional: Positive for malaise/fatigue. Negative for chills and fever.   Cardiovascular:  Positive for dyspnea on exertion. Negative for chest pain.   Respiratory:  Positive for shortness of breath. Negative for cough.    Skin:  Negative for itching.   Musculoskeletal:  Negative for joint swelling.   Gastrointestinal:  Negative for abdominal pain, diarrhea, nausea and vomiting.   Neurological:  Negative for dizziness, focal weakness and light-headedness.         Objective   Physical Exam  Physical Exam  Constitutional:       General: She is not in acute distress.     Appearance: Normal appearance. She is not ill-appearing.   Cardiovascular:      Rate and Rhythm: Normal rate and regular rhythm.      Pulses: Normal pulses.   Pulmonary:      Effort: Pulmonary effort is normal. No respiratory distress.      Breath sounds: No wheezing.   Abdominal:      General: There is no distension.      Palpations: Abdomen is soft.      Tenderness: There is no abdominal tenderness.   Musculoskeletal:         General: No swelling or tenderness.   Skin:     General: Skin is warm and dry.      Capillary Refill: Capillary refill takes less than 2 seconds.   Neurological:      General: No focal deficit present.      Mental Status: She is alert and oriented to person, place, and time. Mental status is at baseline.         Last Recorded  Vitals  Vitals:    05/29/25 0554 05/29/25 0800 05/29/25 0841 05/29/25 0900   BP:       BP Location:       Pulse:       Resp:       Temp:  36.8 °C (98.2 °F)     TempSrc:  Temporal     SpO2:   91% (!) 87%   Weight: 72.1 kg (158 lb 15.2 oz) 72.1 kg (158 lb 15.2 oz)     Height:            Intake/Output last 3 Shifts:  I/O last 3 completed shifts:  In: 480 (6.7 mL/kg) [P.O.:480]  Out: 50 (0.7 mL/kg) [Chest Tube:50]  Weight: 72.1 kg     Inpatient Medications  Scheduled Medications[1]  Continuous medications  Continuous Medications[2]  PRN medications  PRN Medications[3]     LDA:       Relevant Results  Lab Review  Results from last 7 days   Lab Units 05/29/25  0518   WBC AUTO x10*3/uL 6.7   HEMOGLOBIN g/dL 11.5*   HEMATOCRIT % 35.8*   PLATELETS AUTO x10*3/uL 165     Results from last 7 days   Lab Units 05/29/25  0518   SODIUM mmol/L 138   POTASSIUM mmol/L 3.9   CHLORIDE mmol/L 103   CO2 mmol/L 27   BUN mg/dL 9   CREATININE mg/dL 0.80   CALCIUM mg/dL 8.8   GLUCOSE mg/dL 115*     Results from last 7 days   Lab Units 05/29/25  0518   MAGNESIUM mg/dL 1.93             Radiographic Testing  EKG:  ECG 12 lead 05/27/2025 (Preliminary)  Normal sinus rhythm  Nonspecific T wave abnormality  Abnormal ECG  No previous ECGs available    Echo:  Transthoracic Echo (TTE) Complete 11/11/2024  PHYSICIAN INTERPRETATION:  Left Ventricle: The left ventricular systolic function is normal, with a visually estimated ejection fraction of 55-60%. There are no regional left ventricular wall motion abnormalities. The left ventricular cavity size is normal. There is mildly increased septal and normal posterior left ventricular wall thickness. Spectral Doppler shows a normal pattern of left ventricular diastolic filling.  Left Atrium: The left atrium is normal in size. Left atrial appendage was not assessed.  Right Ventricle: The right ventricle is normal in size. There is normal right ventricular global systolic function.  Right Atrium: The right  atrium is normal in size.  Aortic Valve: The aortic valve is structurally normal. The aortic valve dimensionless index is 0.82. There is no evidence of aortic valve regurgitation. The peak instantaneous gradient of the aortic valve is 5 mmHg. The mean gradient of the aortic valve is 3 mmHg.  Mitral Valve: The mitral valve is normal in structure. There is trace mitral valve regurgitation.  Tricuspid Valve: The tricuspid valve is structurally normal. There is trace tricuspid regurgitation. The right ventricular systolic pressure is unable to be estimated.  Pulmonic Valve: The pulmonic valve is not well visualized. The pulmonic valve regurgitation was not well visualized.  Pericardium: There is no pericardial effusion noted.  Aorta: The aortic root is normal.        CONCLUSIONS:   1. The left ventricular systolic function is normal, with a visually estimated ejection fraction of 55-60%.       Ejection Fractions:  EF   Date/Time Value Ref Range Status   11/11/2024 05:45 PM 58 %      Cath:  No results found for this or any previous visit from the past 1095 days.    Stress Test:  No results found for this or any previous visit from the past 1095 days.    Cardiac Imaging:  No results found for this or any previous visit from the past 1095 days.    Chest Imaging:  XR chest 1 view   Final Result   1. Left basal haziness more conspicuous from prior possibly   atelectasis. Advise clinical correlation and follow-up to ensure   resolution.   2. Stable left suprahilar ill-defined infiltrate could be   postoperative changes and would warrant follow-up to ensure resolution        Signed by: Ravindra Gallardo 5/29/2025 8:15 AM   Dictation workstation:   [id number]      XR chest 1 view   Final Result   Bibasilar airspace consolidations, as above. Clinical correlation and   continued follow-up until clearing is recommended.        MACRO:   None.        Signed by: Steven Calderon 5/28/2025 1:46 PM   Dictation workstation:   [id number]       XR chest 1 view   Final Result   1.  Improvement of left lung atelectasis. Persistent right basilar   atelectasis.        Signed by: Jeb Walker 5/28/2025 9:12 AM   Dictation workstation:   [id number]      XR chest 1 view   Final Result   1.  As above.        Signed by: Jeb Walker 5/27/2025 11:38 AM   Dictation workstation:   [id number]      XR abdomen 1 view    (Results Pending)           History Of Present Illness  Tuyet Ramirez is a 69 y.o. female with a PMH significant for left upper lobe adenocarcinoma, Colon cancer s/p bowel resection, COPD, HTN, and depression  who presents to [facility] on 5/27/2025 for planned Left lung wedge resection with Dr. Tuttle.    Daily Progress Note     5/27/2025: Patient arrived to unit from PACU in stable condition. She is on 3L NC. Only complaint at this time is pain. She is scheduled tylenol, mag-ox, robaxin, and lidocaine patches. PRN Oxy and fentanyl. Plan to give one time dose of dilaudid. Minimal chest tube output. Chest tube with small intermittent airleak present; maintain to waterseal and follow up CXR in morning.      - Initial Chest Tube Output: 40mL  -Current O2 Requirements: 3L NC     05/28/2025: NAEON. Restarted home carvedilol. On 2L NC this AM. Patient walked with nursing this AM on room air and oxygen saturations were 73-80%, patient placed back on 2L NC. Chest tube removed. Tentative plan to discharge today. Plan to repeat CXR at noon and trial patient walking again on room air. If she again desaturates < 88%, may need home O2 (which patient adamantly does not want) or keep overnight and continue weaning oxygen.    -Current O2 requirement: 2L NC    05/29/2025: No acute events overnight. Continues on 2L home O2 this AM; although, slight improvement in oxygen while ambulating. Plan to discharge patient today with home O2.    -Current O2 requirement: 2L NC  Assessment & Plan        Left Upper Lobe Lung Nodule  - CT scan of the  "chest that noted acute rib fractures. Concurrently they noted a 1.4 cm left upper lobe lung nodule along with \"several tiny granuloma seen within the lungs\", which appear grossly stable. She received follow-up imaging with a PET scan on 3/3/2025, which showed mild FDG avidity with a left upper lobe 1.5 cm lesion (SUV 2.7)   - CT biopsy on 3/31/25.  Pathology showed Adenocarcinoma, acinar pattern   - S/p robotic left lung wedge resection and lymph node resection on 5/27/2025   with Thoracic Surgeon, Dr. Josh Tuttle   - Follow up surgical pathology           Acute Respiratory Insufficiency   - As anticipated in the immediate post operative period   - Current O2 Requirements: 2 L NC  - IS/Deep Breathing Exercises  - Keep SpO2 > 88% given COPD history  - Wean supplemental O2 as tolerable, will discharge home with temporary home O2        Risk for Post-Operative Arrythmia   - Given thoracic surgery, patient is at risk for development of atrial fibrilllation   - Patient started on IV metoprolol yesterday for risk of a fib with plan to continue for 30 days  --> Restarted home carvedilol yesterday, 12.5mg daily  - Daily labs, replace electrolytes as needed        Acute Post-Op Pain  - As expected   - Multimodal pain control   --> Scheduled: Acetaminophen, magnesium oxide 400mg QD, gabapentin 100mg TID, methocarbamol 500mg q6h   --> PRN: oxycodone  - Bowel regimen while taking narcotics         Essential HTN  - Home Medication: Carvedilol 12.5mg Daily, Amlodipine 10mg daily  - Continue amlodipine, carvedilol           Hyperlipidemia  - Home Medication: Rosuvastatin 10mg Daily  - Continue statin therapy as above     Depression  - Continue Zoloft     Risk for Electrolyte Disturbances  - Optimize electrolytes per [facility] protocol       Bowel Regimen  Constipation  - Senna-S BID, miralax  -No bowel movement since surgery, milk of mag ordered for today  - PRN suppositories      Prophylaxis  - GI: PPI  - DVT: Brent-Hose & " subq heparin  - PT/OT eval-once clinically stable      Disposition  - CVICU (stepdown status)       Above patient and plan discussed with thoracic surgeon, Dr. Stella Tuttle.       Agnes Ruffin, APRN-CNP            [1] amLODIPine, 10 mg, oral, Daily  carvedilol, 12.5 mg, oral, Daily  tiotropium, 2 puff, inhalation, Daily   And  formoterol, 20 mcg, nebulization, q12h  heparin (porcine), 5,000 Units, subcutaneous, q8h  lidocaine, 1 patch, transdermal, Daily  magnesium oxide, 400 mg of magnesium oxide, oral, Daily  methocarbamol, 500 mg, oral, q8h TOBIAS  pantoprazole, 40 mg, oral, Daily before breakfast  polyethylene glycol, 17 g, oral, Daily  rosuvastatin, 10 mg, oral, Daily  sennosides-docusate sodium, 2 tablet, oral, BID  sertraline, 50 mg, oral, Daily     [2]    [3] PRN medications: albuterol, bisacodyl, bisacodyl, guaiFENesin, hydrOXYzine HCL, naloxone, ondansetron **OR** ondansetron, oxyCODONE, oxyCODONE, oxygen

## 2025-05-29 NOTE — CARE PLAN
Problem: Pain  Goal: Takes deep breaths with improved pain control throughout the shift  Outcome: Adequate for Discharge  Goal: Turns in bed with improved pain control throughout the shift  Outcome: Adequate for Discharge  Goal: Walks with improved pain control throughout the shift  Outcome: Adequate for Discharge  Goal: Performs ADL's with improved pain control throughout shift  Outcome: Adequate for Discharge     Problem: Pain - Adult  Goal: Verbalizes/displays adequate comfort level or baseline comfort level  Outcome: Adequate for Discharge     Problem: Safety - Adult  Goal: Free from fall injury  Outcome: Adequate for Discharge     Problem: Discharge Planning  Goal: Discharge to home or other facility with appropriate resources  Outcome: Adequate for Discharge     Problem: Chronic Conditions and Co-morbidities  Goal: Patient's chronic conditions and co-morbidity symptoms are monitored and maintained or improved  Outcome: Adequate for Discharge     Problem: Fall/Injury  Goal: Not fall by end of shift  Outcome: Adequate for Discharge  Goal: Be free from injury by end of the shift  Outcome: Adequate for Discharge  Goal: Verbalize understanding of personal risk factors for fall in the hospital  Outcome: Adequate for Discharge  Goal: Verbalize understanding of risk factor reduction measures to prevent injury from fall in the home  Outcome: Adequate for Discharge  Goal: Use assistive devices by end of the shift  Outcome: Adequate for Discharge  Goal: Pace activities to prevent fatigue by end of the shift  Outcome: Adequate for Discharge

## 2025-06-09 NOTE — PROGRESS NOTES
"Subjective   Tuyet Ramirez  is a 69 y.o. female who presents for evaluation of a left upper lobe nodule status post Robotic BRIGHT wedge resection on 5/27/25.     In September 2024 this patient presented to the hospital with 5 days of pleuritic chest pain.  She received a CT scan of the chest that noted acute rib fractures.  Concurrently they noted a 1.4 cm left upper lobe lung nodule along with \"several tiny granuloma seen within the lungs\", which appear grossly stable.  She received follow-up imaging with a PET scan on 3/3/2025, which showed mild FDG avidity with a left upper lobe 1.5 cm lesion (SUV 2.7).  Radiology recommended \"correlation with tissue sampling to exclude a neoplastic process\".  I recommended a CT biopsy.  This was performed on 3/31/2025 and pathology was adenocarcinoma.  She received pulmonary function tests on 5/6/2025 which showed an FEV1 of 66% and a DLCO of 63%.  I recommended a wedge resection, and she was taken to the operating room on 5/27/2025 and received a robotic left upper lobe wedge resection.  She was discharged on 5/29/2025.  Pathology result showed a pT1b N0 adenocarcinoma resected with negative margins.    Currently the patient is presenting for post-operative evaluation. She denies the following symptoms: chest pain, shortness of breath at rest, shortness of breath with activity, cough, hemoptysis, fevers, chills, and weight loss.      There have been no significant changes to their documented medical, surgical and family history.     She  reports that she has quit smoking. Her smoking use included cigarettes. She has been exposed to tobacco smoke. She has never used smokeless tobacco. She reports current alcohol use. She reports that she does not currently use drugs. Frequency: 21.00 times per week.    Objective   Physical Exam  The patient is well-appearing and in no acute distress. The trachea is midline and there is no crepitus. The lungs were clear to auscultation, there was " no dullness to percussion, no fremitus or egophony. There was good effort and excursion. The heart had a regular rate and rhythm. The abdomen was soft, nontender and nondistended. The extremities had no edema. Surgical incisions are healing well.  Diagnostic Studies  I reviewed a post-operative CXR which shows no significant pleural effusion or pneumothorax  I have reviewed a pathology result : pT1b N0, all margins negative for invasive tumor (margin distance 1.8 cm)    Assessment/Plan   I believe that the patient is recovering appropriately from their recent surgery.     The patient is recently status post surgical intervention.  I believe they are recovering appropriately.  Their clinical and/or radiographic presentation suggest no evidence of postoperative complications.  Based on the patient's pathologic stage, I recommend that they receive no additional treatment at this time.  I would recommend radiographic surveillance of early stage lung cancer with radiographic imaging performed every 6 months for 2 years and yearly after this.    I recommend CT chest in 6 months    I discussed this in detail with the patient, including a discussion of alternatives. They were comfortable with this approach.     Josh Tuttle MD  [phone]

## 2025-06-10 ENCOUNTER — TELEPHONE (OUTPATIENT)
Dept: SURGERY | Facility: CLINIC | Age: 70
End: 2025-06-10
Payer: MEDICARE

## 2025-06-10 NOTE — TELEPHONE ENCOUNTER
Voicemail left for patient regarding her upcoming post op appointment.  Patient reminded that he needs to have a chest xray done prior to her appointment.  Explained that she can have her xray completed at any  facility or she can come to [facility] main radiology department 1 hr prior to her appointment to have it completed.  Address for  [facility] hospital and office left on voicemail along with office number should she have any further questions.

## 2025-06-11 ENCOUNTER — OFFICE VISIT (OUTPATIENT)
Dept: SURGERY | Facility: CLINIC | Age: 70
End: 2025-06-11
Payer: MEDICARE

## 2025-06-11 ENCOUNTER — HOSPITAL ENCOUNTER (OUTPATIENT)
Dept: RADIOLOGY | Facility: HOSPITAL | Age: 70
Discharge: HOME | End: 2025-06-11
Payer: MEDICARE

## 2025-06-11 VITALS
OXYGEN SATURATION: 96 % | SYSTOLIC BLOOD PRESSURE: 150 MMHG | TEMPERATURE: 99.3 F | WEIGHT: 150.8 LBS | DIASTOLIC BLOOD PRESSURE: 91 MMHG | BODY MASS INDEX: 29.61 KG/M2 | HEIGHT: 60 IN | HEART RATE: 98 BPM

## 2025-06-11 DIAGNOSIS — C34.90 MALIGNANT NEOPLASM OF LUNG, UNSPECIFIED LATERALITY, UNSPECIFIED PART OF LUNG (MULTI): ICD-10-CM

## 2025-06-11 DIAGNOSIS — C34.12 MALIGNANT NEOPLASM OF UPPER LOBE OF LEFT LUNG (MULTI): ICD-10-CM

## 2025-06-11 LAB
LAB AP ASR DISCLAIMER: NORMAL
LAB AP BLOCK FOR ADDITIONAL STUDIES: NORMAL
LABORATORY COMMENT REPORT: NORMAL
PATH REPORT.FINAL DX SPEC: NORMAL
PATH REPORT.GROSS SPEC: NORMAL
PATH REPORT.RELEVANT HX SPEC: NORMAL
PATH REPORT.TOTAL CANCER: NORMAL
PATHOLOGY SYNOPTIC REPORT: NORMAL

## 2025-06-11 PROCEDURE — 3008F BODY MASS INDEX DOCD: CPT | Performed by: THORACIC SURGERY (CARDIOTHORACIC VASCULAR SURGERY)

## 2025-06-11 PROCEDURE — 1159F MED LIST DOCD IN RCRD: CPT | Performed by: THORACIC SURGERY (CARDIOTHORACIC VASCULAR SURGERY)

## 2025-06-11 PROCEDURE — 3080F DIAST BP >= 90 MM HG: CPT | Performed by: THORACIC SURGERY (CARDIOTHORACIC VASCULAR SURGERY)

## 2025-06-11 PROCEDURE — 71046 X-RAY EXAM CHEST 2 VIEWS: CPT | Performed by: RADIOLOGY

## 2025-06-11 PROCEDURE — 99211 OFF/OP EST MAY X REQ PHY/QHP: CPT | Mod: 25 | Performed by: THORACIC SURGERY (CARDIOTHORACIC VASCULAR SURGERY)

## 2025-06-11 PROCEDURE — 3077F SYST BP >= 140 MM HG: CPT | Performed by: THORACIC SURGERY (CARDIOTHORACIC VASCULAR SURGERY)

## 2025-06-11 PROCEDURE — 71046 X-RAY EXAM CHEST 2 VIEWS: CPT

## 2025-06-11 PROCEDURE — 1111F DSCHRG MED/CURRENT MED MERGE: CPT | Performed by: THORACIC SURGERY (CARDIOTHORACIC VASCULAR SURGERY)

## 2025-06-11 PROCEDURE — 1126F AMNT PAIN NOTED NONE PRSNT: CPT | Performed by: THORACIC SURGERY (CARDIOTHORACIC VASCULAR SURGERY)

## 2025-06-11 ASSESSMENT — PAIN SCALES - GENERAL: PAINLEVEL_OUTOF10: 0-NO PAIN

## 2025-06-11 ASSESSMENT — ENCOUNTER SYMPTOMS
LOSS OF SENSATION IN FEET: 0
DEPRESSION: 0
OCCASIONAL FEELINGS OF UNSTEADINESS: 0

## 2025-07-07 ENCOUNTER — APPOINTMENT (OUTPATIENT)
Dept: RADIOLOGY | Facility: HOSPITAL | Age: 70
End: 2025-07-07
Payer: MEDICARE

## 2025-07-07 ENCOUNTER — APPOINTMENT (OUTPATIENT)
Dept: CARDIOLOGY | Facility: HOSPITAL | Age: 70
End: 2025-07-07
Payer: MEDICARE

## 2025-07-07 ENCOUNTER — HOSPITAL ENCOUNTER (EMERGENCY)
Facility: HOSPITAL | Age: 70
Discharge: PSYCHIATRIC OR SUBSTANCE USE FACILITY | End: 2025-07-08
Attending: STUDENT IN AN ORGANIZED HEALTH CARE EDUCATION/TRAINING PROGRAM
Payer: MEDICARE

## 2025-07-07 DIAGNOSIS — F32.A DEPRESSION, UNSPECIFIED DEPRESSION TYPE: Primary | ICD-10-CM

## 2025-07-07 DIAGNOSIS — R53.1 GENERALIZED WEAKNESS: ICD-10-CM

## 2025-07-07 LAB
ALBUMIN SERPL BCP-MCNC: 4.1 G/DL (ref 3.4–5)
ALP SERPL-CCNC: 58 U/L (ref 33–136)
ALT SERPL W P-5'-P-CCNC: 15 U/L (ref 7–45)
AMPHETAMINES UR QL SCN: ABNORMAL
ANION GAP SERPL CALC-SCNC: 18 MMOL/L (ref 10–20)
APAP SERPL-MCNC: <10 UG/ML (ref ?–30)
APPEARANCE UR: CLEAR
AST SERPL W P-5'-P-CCNC: 18 U/L (ref 9–39)
BACTERIA #/AREA URNS AUTO: ABNORMAL /HPF
BARBITURATES UR QL SCN: ABNORMAL
BASOPHILS # BLD AUTO: 0.02 X10*3/UL (ref 0–0.1)
BASOPHILS NFR BLD AUTO: 0.3 %
BENZODIAZ UR QL SCN: ABNORMAL
BILIRUB SERPL-MCNC: 0.9 MG/DL (ref 0–1.2)
BILIRUB UR STRIP.AUTO-MCNC: NEGATIVE MG/DL
BNP SERPL-MCNC: 51 PG/ML (ref 0–99)
BUN SERPL-MCNC: 18 MG/DL (ref 6–23)
BZE UR QL SCN: ABNORMAL
CALCIUM SERPL-MCNC: 9.9 MG/DL (ref 8.6–10.3)
CANNABINOIDS UR QL SCN: ABNORMAL
CARDIAC TROPONIN I PNL SERPL HS: 6 NG/L (ref 0–13)
CHLORIDE SERPL-SCNC: 99 MMOL/L (ref 98–107)
CO2 SERPL-SCNC: 26 MMOL/L (ref 21–32)
COLOR UR: YELLOW
CREAT SERPL-MCNC: 1.03 MG/DL (ref 0.5–1.05)
EGFRCR SERPLBLD CKD-EPI 2021: 60 ML/MIN/1.73M*2
EOSINOPHIL # BLD AUTO: 0.14 X10*3/UL (ref 0–0.7)
EOSINOPHIL NFR BLD AUTO: 2.2 %
ERYTHROCYTE [DISTWIDTH] IN BLOOD BY AUTOMATED COUNT: 11.9 % (ref 11.5–14.5)
ETHANOL SERPL-MCNC: <10 MG/DL
FENTANYL+NORFENTANYL UR QL SCN: ABNORMAL
GLUCOSE SERPL-MCNC: 108 MG/DL (ref 74–99)
GLUCOSE UR STRIP.AUTO-MCNC: NORMAL MG/DL
HCT VFR BLD AUTO: 44.9 % (ref 36–46)
HGB BLD-MCNC: 14.9 G/DL (ref 12–16)
HYALINE CASTS #/AREA URNS AUTO: ABNORMAL /LPF
IMM GRANULOCYTES # BLD AUTO: 0.02 X10*3/UL (ref 0–0.7)
IMM GRANULOCYTES NFR BLD AUTO: 0.3 % (ref 0–0.9)
KETONES UR STRIP.AUTO-MCNC: ABNORMAL MG/DL
LEUKOCYTE ESTERASE UR QL STRIP.AUTO: NEGATIVE
LYMPHOCYTES # BLD AUTO: 1.47 X10*3/UL (ref 1.2–4.8)
LYMPHOCYTES NFR BLD AUTO: 23.1 %
MAGNESIUM SERPL-MCNC: 1.55 MG/DL (ref 1.6–2.4)
MCH RBC QN AUTO: 29.7 PG (ref 26–34)
MCHC RBC AUTO-ENTMCNC: 33.2 G/DL (ref 32–36)
MCV RBC AUTO: 90 FL (ref 80–100)
METHADONE UR QL SCN: ABNORMAL
MONOCYTES # BLD AUTO: 0.62 X10*3/UL (ref 0.1–1)
MONOCYTES NFR BLD AUTO: 9.8 %
MUCOUS THREADS #/AREA URNS AUTO: ABNORMAL /LPF
NEUTROPHILS # BLD AUTO: 4.08 X10*3/UL (ref 1.2–7.7)
NEUTROPHILS NFR BLD AUTO: 64.3 %
NITRITE UR QL STRIP.AUTO: NEGATIVE
NRBC BLD-RTO: 0 /100 WBCS (ref 0–0)
OPIATES UR QL SCN: ABNORMAL
OXYCODONE+OXYMORPHONE UR QL SCN: ABNORMAL
PCP UR QL SCN: ABNORMAL
PH UR STRIP.AUTO: 6 [PH]
PLATELET # BLD AUTO: 239 X10*3/UL (ref 150–450)
POTASSIUM SERPL-SCNC: 3.6 MMOL/L (ref 3.5–5.3)
PROT SERPL-MCNC: 7.8 G/DL (ref 6.4–8.2)
PROT UR STRIP.AUTO-MCNC: ABNORMAL MG/DL
RBC # BLD AUTO: 5.01 X10*6/UL (ref 4–5.2)
RBC # UR STRIP.AUTO: NEGATIVE MG/DL
RBC #/AREA URNS AUTO: ABNORMAL /HPF
SALICYLATES SERPL-MCNC: <3 MG/DL (ref ?–20)
SODIUM SERPL-SCNC: 139 MMOL/L (ref 136–145)
SP GR UR STRIP.AUTO: >1.05
SQUAMOUS #/AREA URNS AUTO: ABNORMAL /HPF
UROBILINOGEN UR STRIP.AUTO-MCNC: ABNORMAL MG/DL
WBC # BLD AUTO: 6.4 X10*3/UL (ref 4.4–11.3)
WBC #/AREA URNS AUTO: ABNORMAL /HPF

## 2025-07-07 PROCEDURE — 93005 ELECTROCARDIOGRAM TRACING: CPT

## 2025-07-07 PROCEDURE — 85025 COMPLETE CBC W/AUTO DIFF WBC: CPT | Performed by: PHYSICIAN ASSISTANT

## 2025-07-07 PROCEDURE — 84484 ASSAY OF TROPONIN QUANT: CPT | Performed by: PHYSICIAN ASSISTANT

## 2025-07-07 PROCEDURE — 80179 DRUG ASSAY SALICYLATE: CPT | Performed by: PHYSICIAN ASSISTANT

## 2025-07-07 PROCEDURE — 81001 URINALYSIS AUTO W/SCOPE: CPT | Mod: 59 | Performed by: PHYSICIAN ASSISTANT

## 2025-07-07 PROCEDURE — 74018 RADEX ABDOMEN 1 VIEW: CPT

## 2025-07-07 PROCEDURE — 2550000001 HC RX 255 CONTRASTS: Performed by: PHYSICIAN ASSISTANT

## 2025-07-07 PROCEDURE — 99285 EMERGENCY DEPT VISIT HI MDM: CPT | Mod: 25 | Performed by: STUDENT IN AN ORGANIZED HEALTH CARE EDUCATION/TRAINING PROGRAM

## 2025-07-07 PROCEDURE — 83880 ASSAY OF NATRIURETIC PEPTIDE: CPT | Performed by: PHYSICIAN ASSISTANT

## 2025-07-07 PROCEDURE — 74018 RADEX ABDOMEN 1 VIEW: CPT | Performed by: STUDENT IN AN ORGANIZED HEALTH CARE EDUCATION/TRAINING PROGRAM

## 2025-07-07 PROCEDURE — 2500000001 HC RX 250 WO HCPCS SELF ADMINISTERED DRUGS (ALT 637 FOR MEDICARE OP): Performed by: NURSE PRACTITIONER

## 2025-07-07 PROCEDURE — 80053 COMPREHEN METABOLIC PANEL: CPT | Performed by: PHYSICIAN ASSISTANT

## 2025-07-07 PROCEDURE — 36415 COLL VENOUS BLD VENIPUNCTURE: CPT | Performed by: PHYSICIAN ASSISTANT

## 2025-07-07 PROCEDURE — 80307 DRUG TEST PRSMV CHEM ANLYZR: CPT | Performed by: PHYSICIAN ASSISTANT

## 2025-07-07 PROCEDURE — 83735 ASSAY OF MAGNESIUM: CPT | Performed by: PHYSICIAN ASSISTANT

## 2025-07-07 PROCEDURE — 71275 CT ANGIOGRAPHY CHEST: CPT

## 2025-07-07 PROCEDURE — 71275 CT ANGIOGRAPHY CHEST: CPT | Performed by: RADIOLOGY

## 2025-07-07 RX ORDER — MIRTAZAPINE 15 MG/1
7.5 TABLET, FILM COATED ORAL ONCE
Status: COMPLETED | OUTPATIENT
Start: 2025-07-07 | End: 2025-07-07

## 2025-07-07 RX ADMIN — IOHEXOL 75 ML: 350 INJECTION, SOLUTION INTRAVENOUS at 16:01

## 2025-07-07 RX ADMIN — MIRTAZAPINE 7.5 MG: 15 TABLET, FILM COATED ORAL at 23:41

## 2025-07-07 SDOH — HEALTH STABILITY: MENTAL HEALTH
OTHER SUICIDE PRECAUTIONS INCLUDE: PATIENT PLACED IN AN EASILY OBSERVABLE ROOM WITH DOOR/CURTAIN REMAINING OPEN;PATIENT PLACED IN GOWN (SNAPS OR PAPER GOWNS PREFERRED) AND WANDED;REMAINING RISKS IDENTIFIED AND MITIGATED;PATIENT PLACED IN PSYCH SAFE ROOM (IF AVAILABLE);PROVIDER NOTIFIED;FREQUENT ROUNDING WITH IRREGULAR CHECKS AT MINIMUM OF EVERY 15 MINUTES TO ASSESS PSYCH SAFETY PERFORMED;PERSONAL BELONGINGS SECURED;TREATMENT PLAN BASED ON RISK FACTORS DEVELOPED (ED ONLY - IF PATIENT IN ED MORE THAN 8 HOURS);HOURLY BEHAVIORAL ASSESSMENT PERFORMED

## 2025-07-07 SDOH — HEALTH STABILITY: MENTAL HEALTH: BEHAVIORS/MOOD: FLAT AFFECT;WITHDRAWN

## 2025-07-07 SDOH — HEALTH STABILITY: MENTAL HEALTH: BEHAVIORAL HEALTH(WDL): EXCEPTIONS TO WDL

## 2025-07-07 ASSESSMENT — LIFESTYLE VARIABLES
EVER FELT BAD OR GUILTY ABOUT YOUR DRINKING: NO
EVER HAD A DRINK FIRST THING IN THE MORNING TO STEADY YOUR NERVES TO GET RID OF A HANGOVER: NO
TOTAL SCORE: 0
HAVE PEOPLE ANNOYED YOU BY CRITICIZING YOUR DRINKING: NO
HAVE YOU EVER FELT YOU SHOULD CUT DOWN ON YOUR DRINKING: NO

## 2025-07-07 ASSESSMENT — PAIN SCALES - GENERAL: PAINLEVEL_OUTOF10: 0 - NO PAIN

## 2025-07-07 NOTE — ED PROVIDER NOTES
Limitations to History: Clinical condition  External Records Reviewed  Independent Historians: Self, patient sister  Social determinants affecting care: None    HPI  Tuyet Ramirez is a 69 y.o. female with significant past medical history of adenocarcinoma of the lung, colon cancer with bowel resection, COPD, hypertension, depression, who presents emergency department for assessment of increased depression.  Patient sister at bedside who help provide the history.  She reports that her sister has not been eating, drinking, or sleeping.  She has not been taking care of herself.  She is not showering.  She has not been taking her medications.  She has been constipated.  The patient reports that she has been feeling really depressed.  She denies any suicidal or homicidal ideation.  Patient's sister reports that she recently had left lung wedge resection due to lung cancer.  She has been placed she has had bad depression in the past which she had to have inpatient psychiatric placement.  She denies any chest pains or shortness of breath.  She denies any pain.  The patient and her sister have no further complaints.      [facility]  Medical History[1] reviewed by myself.    Meds  Current Outpatient Medications   Medication Instructions    albuterol 90 mcg/actuation inhaler 2 puffs, Every 4 hours PRN    alendronate (FOSAMAX) 70 mg, Every 7 days    amLODIPine (NORVASC) 10 mg, Daily    carvedilol (Coreg) 12.5 mg tablet Take 1 tablet (12.5 mg) by mouth once daily.    hydrOXYzine HCL (ATARAX) 25 mg, Every 8 hours PRN    magnesium oxide (Mag-Ox) 400 mg (241.3 mg elemental) tablet 1 tablet, oral, Daily    methocarbamol (ROBAXIN) 500 mg, oral, Every 8 hours scheduled    omeprazole (PriLOSEC) 40 mg DR capsule TAKE 1 TABLET BY MOUTH 30-60 MINUTES BEFORE BREAKFAST.    rosuvastatin (CRESTOR) 10 mg, Daily    sennosides-docusate sodium (Phyllis-Colace) 8.6-50 mg tablet 2 tablets, oral, 2 times daily    sertraline (ZOLOFT) 50 mg, Daily    Stiolto  Respimat 2.5-2.5 mcg/actuation mist inhaler 2 Inhalations, Daily    triamcinolone (Kenalog) 0.5 % cream        Allergies  RX Allergies[2] reviewed by myself.    SHx  Social History[3] reviewed by myself.      ------------------------------------------------------------------------------------------------------------------------------------------    BP (!) 143/93 (BP Location: Right arm, Patient Position: Sitting)   Pulse (!) 115   Temp 37 °C (98.6 °F) (Tympanic)   Resp 18   Wt 65.8 kg (145 lb)   SpO2 95%   BMI 28.32 kg/m²     Physical Exam  Vitals and nursing note reviewed.   Constitutional:       General: She is not in acute distress.     Appearance: Normal appearance. She is normal weight. She is not ill-appearing or toxic-appearing.   HENT:      Head: Normocephalic.      Nose: Nose normal.      Mouth/Throat:      Mouth: Mucous membranes are moist.   Eyes:      Extraocular Movements: Extraocular movements intact.      Conjunctiva/sclera: Conjunctivae normal.   Cardiovascular:      Rate and Rhythm: Regular rhythm. Tachycardia present.   Pulmonary:      Effort: Pulmonary effort is normal.      Breath sounds: Normal breath sounds.   Abdominal:      General: Abdomen is flat.      Palpations: Abdomen is soft.      Tenderness: There is no abdominal tenderness. There is no guarding or rebound.   Musculoskeletal:         General: Normal range of motion.      Cervical back: Neck supple.   Skin:     General: Skin is warm and dry.   Neurological:      Mental Status: She is alert and oriented to person, place, and time.   Psychiatric:         Attention and Perception: Attention normal.         Mood and Affect: Mood is depressed.         Behavior: Behavior is withdrawn. Behavior is cooperative.          ------------------------------------------------------------------------------------------------------------------------------------------  Labs  Labs Reviewed   MAGNESIUM - Abnormal       Result Value    Magnesium 1.55  (*)    COMPREHENSIVE METABOLIC PANEL - Abnormal    Glucose 108 (*)     Sodium 139      Potassium 3.6      Chloride 99      Bicarbonate 26      Anion Gap 18      Urea Nitrogen 18      Creatinine 1.03      eGFR 60 (*)     Calcium 9.9      Albumin 4.1      Alkaline Phosphatase 58      Total Protein 7.8      AST 18      Bilirubin, Total 0.9      ALT 15     TROPONIN I, HIGH SENSITIVITY - Normal    Troponin I, High Sensitivity 6      Narrative:     Less than 99th percentile of normal range cutoff-  Female and children under 18 years old <14 ng/L; Male <21 ng/L: Negative  Repeat testing should be performed if clinically indicated.     Female and children under 18 years old 14-50 ng/L; Male 21-50 ng/L:  Consistent with possible cardiac damage and possible increased clinical   risk. Serial measurements may help to assess extent of myocardial damage.     >50 ng/L: Consistent with cardiac damage, increased clinical risk and  myocardial infarction. Serial measurements may help assess extent of   myocardial damage.      NOTE: Children less than 1 year old may have higher baseline troponin   levels and results should be interpreted in conjunction with the overall   clinical context.     NOTE: Troponin I testing is performed using a different   testing methodology at [facility] than at other   [facility]. Direct result comparisons should only   be made within the same method.   B-TYPE NATRIURETIC PEPTIDE - Normal    BNP 51      Narrative:        <100 pg/mL - Heart failure unlikely  100-299 pg/mL - Intermediate probability of acute heart                  failure exacerbation. Correlate with clinical                  context and patient history.    >=300 pg/mL - Heart Failure likely. Correlate with clinical                  context and patient history.    BNP testing is performed using different testing methodology at [facility] than at other [facility]. Direct result comparisons should only  be made within the same method.      ACUTE TOXICOLOGY PANEL, BLOOD - Normal    Acetaminophen <10.0      Salicylate  <3      Alcohol <10     CBC WITH AUTO DIFFERENTIAL    WBC 6.4      nRBC 0.0      RBC 5.01      Hemoglobin 14.9      Hematocrit 44.9      MCV 90      MCH 29.7      MCHC 33.2      RDW 11.9      Platelets 239      Neutrophils % 64.3      Immature Granulocytes %, Automated 0.3      Lymphocytes % 23.1      Monocytes % 9.8      Eosinophils % 2.2      Basophils % 0.3      Neutrophils Absolute 4.08      Immature Granulocytes Absolute, Automated 0.02      Lymphocytes Absolute 1.47      Monocytes Absolute 0.62      Eosinophils Absolute 0.14      Basophils Absolute 0.02     URINALYSIS WITH REFLEX CULTURE AND MICROSCOPIC    Narrative:     The following orders were created for panel order Urinalysis with Reflex Culture and Microscopic.  Procedure                               Abnormality         Status                     ---------                               -----------         ------                     Urinalysis with Reflex C...[[id number]]                                                 Extra Urine Gray Tube[[id number]]                                                         Please view results for these tests on the individual orders.   DRUG SCREEN,URINE   URINALYSIS WITH REFLEX CULTURE AND MICROSCOPIC   EXTRA URINE GRAY TUBE        Imaging  CT angio chest for pulmonary embolism   Final Result   No evidence of PE.        Comminuted fracture of the sternum. Healing left rib fractures. T12   compression fracture.        Postop changes with residual soft tissue associated with the surgical   clips. This may be residual postop change. Attention at follow-up is   suggested.        Signed by: Mariel Smith 7/7/2025 4:17 PM   Dictation workstation:   [id number]           ED Course  Diagnoses as of 07/07/25 1707   Depression, unspecified depression type        Medical Decision Making: She did not appear ill or toxic.   Vital signs reviewed.  She is tachycardic.  She is 95% on room air.  Comprehensive workup initiated for medical clearance to see psych.    Differential diagnoses considered: Pulmonary embolism, pneumonia, depression, electrolyte abnormalities, others    EKG interpreted by myself and ED attending: Sinus tachycardia.  Ventricular rate 119 bpm.  No acute ST elevations.    I reviewed the labs from today.  No leukocytosis or leukopenia.  H&H stable.  BUN and creatinine normal.  Kidneys and 1.55.  Tylenol, salicylate, alcohol levels negative.  Troponin negative.  BNP negative.  UA and urine drug screen pending.  CT of the chest showing no evidence of pulmonary embolism.  Postsurgical changes noted.  Patient medically cleared for psychiatric evaluation.  Case discussed with my colleague, Patricia Doyle.  She will be making final patient disposition.       [1]   Past Medical History:  Diagnosis Date    Colon cancer (Multi)     Lung nodule     Nausea 01/14/2022    Daily nausea   [2] No Known Allergies  [3]   Social History  Tobacco Use    Smoking status: Former     Current packs/day: 1.00     Types: Cigarettes     Passive exposure: Current    Smokeless tobacco: Never    Tobacco comments:     Pt states stop smoking but sometimes goes back and forth   Substance Use Topics    Alcohol use: Yes     Comment: rare    Drug use: Not Currently     Frequency: 21.0 times per week        Mino Paz PA-C  07/07/25 [id number]

## 2025-07-07 NOTE — PROGRESS NOTES
Handoff received: 7/7/2025 1700 handoff care received from Mino DUTTA at this time. Please refer to her note for initial plan of care of this patient.     Patient signed out to me pending workup results and EPAT evaluation/recommendations.  I agree with my prior providers assessment and plan of care.  I did add on a KUB due to the patient's complaint of constipation.  Additional workup was reviewed and unremarkable.  She is calm and cooperative and medically cleared for EPAT evaluation.  Noted sternal fractures on CT PE are old per chart review.    EPAT performed the evaluation recommend inpatient psychiatric placement for further care.  I ordered medications per their recommendations.  Clinical presentation likely consistent with depression, inability to care for self, constipation. Findings were discussed with patient and patient acknowledged understanding of plan.  She was ultimately placed at [facility] for inpatient psychiatric management.  She will be transported via hospital transport.  Pink slip filled out by my ED attending.  All questions and concerns were addressed.    This patient was staffed with ED Attending Dr. Guo to review the plan of care during ED course.     *Please note that portions of this note may have been completed with a voice recognition program.  Efforts were made to edit the dictations but occasionally, words are mis-transcribed.
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
## 2025-07-07 NOTE — ED NOTES
PATIENT SISTER, DAVID REQUESTING TO BE UPDATED WITH PLAN OF CARE.      Nancy Samuels, COLETTE  07/07/25 [id number]

## 2025-07-08 VITALS
BODY MASS INDEX: 28.47 KG/M2 | RESPIRATION RATE: 16 BRPM | DIASTOLIC BLOOD PRESSURE: 78 MMHG | WEIGHT: 145 LBS | TEMPERATURE: 96.8 F | HEART RATE: 90 BPM | HEIGHT: 60 IN | OXYGEN SATURATION: 99 % | SYSTOLIC BLOOD PRESSURE: 129 MMHG

## 2025-07-08 PROBLEM — R41.89 OTHER SYMPTOMS AND SIGNS INVOLVING COGNITIVE FUNCTIONS AND AWARENESS: Status: ACTIVE | Noted: 2025-07-08

## 2025-07-08 LAB
ATRIAL RATE: 119 BPM
HOLD SPECIMEN: NORMAL
P AXIS: 72 DEGREES
P OFFSET: 202 MS
P ONSET: 155 MS
PR INTERVAL: 118 MS
Q ONSET: 214 MS
QRS COUNT: 19 BEATS
QRS DURATION: 70 MS
QT INTERVAL: 326 MS
QTC CALCULATION(BAZETT): 458 MS
QTC FREDERICIA: 409 MS
R AXIS: -31 DEGREES
T AXIS: 78 DEGREES
T OFFSET: 377 MS
VENTRICULAR RATE: 119 BPM

## 2025-07-08 SDOH — HEALTH STABILITY: MENTAL HEALTH: BEHAVIORS/MOOD: SLEEPING

## 2025-07-08 SDOH — HEALTH STABILITY: MENTAL HEALTH: BEHAVIORAL HEALTH(WDL): EXCEPTIONS TO WDL

## 2025-07-08 NOTE — SIGNIFICANT EVENT
Application for Emergency Admission      Ready for Transfer?  Is the patient medically cleared for transfer to inpatient psychiatry: Yes  Has the patient been accepted to an inpatient psychiatric hospital: Yes    Application for Emergency Admission  IN ACCORDANCE WITH [place]122[place]  The Chief Clinical Officer of: [facility] 7/7/2025 .11:23 PM    Reason for Hospitalization  The undersigned has reason to believe that: Tuyet Ramirez Is a mentally ill person subject to hospitalization by court order under division B Section 5122.01 of [place] Revised Code, i.e., this person:    1.No  Represents a substantial risk of physical harm to self as manifested by evidence of threats of, or attempts at, suicide or serious self-inflicted bodily harm    2.No Represents a substantial risk of physical harm to others as manifested by evidence of recent homicidal or other violent behavior, evidence of recent threats that place another in reasonable fear of violent behavior and serious physical harm, or other evidence of present dangerousness    3.Yes Represents a substantial and immediate risk of serious physical impairment or injury to self as manifested by  evidence that the person is unable to provide for and is not providing for the person's basic physical needs because of the person's mental illness and that appropriate provision for those needs cannot be made  immediately available in the community    4.Yes Would benefit from treatment in a hospital for his mental illness and is in need of such treatment as manifested by evidence of behavior that creates a grave and imminent risk to substantial rights of others or  himself.    5.Yes Would benefit from treatment as manifested by evidence of behavior that indicates all of the following:       (a) The person is unlikely to survive safely in the community without supervision, based on a clinical determination.       (b) The person has a history of lack of  compliance with treatment for mental illness and one of the following applies:      (i) At least twice within the thirty-six months prior to the filing of an affidavit seeking court-ordered treatment of the person under section 5122.111 of the Revised Code, the lack of compliance has been a significant factor in necessitating hospitalization in a hospital or receipt of services in a forensic or other mental health unit of a correctional facility, provided that the thirty-six-month period shall be extended by the length of any hospitalization or incarceration of the person that occurred within the thirty-six-month period.      (ii) Within the forty-eight months prior to the filing of an affidavit seeking court-ordered treatment of the person under section 5122.111 of the Revised Code, the lack of compliance resulted in one or more acts of serious violent behavior toward self or others or threats of, or attempts at, serious physical harm to self or others, provided that the forty-eight-month period shall be extended by the length of any hospitalization or incarceration of the person that occurred within the forty-eight-month period.      (c) The person, as a result of mental illness, is unlikely to voluntarily participate in necessary treatment.       (d) In view of the person's treatment history and current behavior, the person is in need of treatment in order to prevent a relapse or deterioration that would be likely to result in substantial risk of serious harm to the person or others.    (e) Represents a substantial risk of physical harm to self or others if allowed to remain at liberty pending examination.    Therefore, it is requested that said person be admitted to the above named facility.    STATEMENT OF BELIEF    Must be filled out by one of the following: a psychiatrist, licensed physician, licensed clinical psychologist, health or ,  or .  (Statement shall include the  circumstances under which the individual was taken into custody and the reason for the person's belief that hospitalization is necessary. The statement shall also include a reference to efforts made to secure the individual's property at his residence if he was taken into custody there. Every reasonable and appropriate effort should be made to take this person into custody in the least conspicuous manner possible.)    Patient sister at bedside who help provide the history.  She reports that her sister has not been eating, drinking, or sleeping.  She has not been taking care of herself.  She is not showering.  She has not been taking her medications.  She has been constipated.  The patient reports that she has been feeling really depressed.  She denies any suicidal or homicidal ideation.  Patient's sister reports that she recently had left lung wedge resection due to lung cancer.  She has been placed she has had bad depression in the past which she had to have inpatient psychiatric placement.  Due to patient's depression inability to care for herself plan for inpatient psychiatric treatment.    STATEMENT OF OBSERVATION BY PSYCHIATRIST, LICENSED PHYSICIAN, OR LICENSED CLINICAL PSYCHOLOGIST, IF APPLICABLE    Place of Observation (e.g., [facility], [facility] hospital, office, emergency facility)  (If applicable, please complete)    Get Guo,  7/7/2025    _____________________________________________________________

## 2025-07-30 LAB
ATRIAL RATE: 119 BPM
P AXIS: 72 DEGREES
P OFFSET: 202 MS
P ONSET: 155 MS
PR INTERVAL: 118 MS
Q ONSET: 214 MS
QRS COUNT: 19 BEATS
QRS DURATION: 70 MS
QT INTERVAL: 326 MS
QTC CALCULATION(BAZETT): 458 MS
QTC FREDERICIA: 409 MS
R AXIS: -31 DEGREES
T AXIS: 78 DEGREES
T OFFSET: 377 MS
VENTRICULAR RATE: 119 BPM

## 2025-09-01 ENCOUNTER — HOSPITAL ENCOUNTER (INPATIENT)
Facility: HOSPITAL | Age: 70
Discharge: NURSING, REHAB OR LONG-TERM CARE | End: 2025-09-01
Attending: STUDENT IN AN ORGANIZED HEALTH CARE EDUCATION/TRAINING PROGRAM | Admitting: STUDENT IN AN ORGANIZED HEALTH CARE EDUCATION/TRAINING PROGRAM
Payer: MEDICARE

## 2025-09-01 DIAGNOSIS — I31.39 PERICARDIAL EFFUSION (HHS-HCC): ICD-10-CM

## 2025-09-01 DIAGNOSIS — N39.0 COMPLICATED UTI (URINARY TRACT INFECTION): Primary | ICD-10-CM

## 2025-09-01 LAB
ALBUMIN SERPL BCP-MCNC: 3.6 G/DL (ref 3.4–5)
ALP SERPL-CCNC: 42 U/L (ref 33–136)
ALT SERPL W P-5'-P-CCNC: 4 U/L (ref 7–45)
ANION GAP SERPL CALC-SCNC: 11 MMOL/L (ref 10–20)
AST SERPL W P-5'-P-CCNC: 11 U/L (ref 9–39)
BILIRUB SERPL-MCNC: 0.4 MG/DL (ref 0–1.2)
BUN SERPL-MCNC: 8 MG/DL (ref 6–23)
CALCIUM SERPL-MCNC: 9.3 MG/DL (ref 8.6–10.3)
CHLORIDE SERPL-SCNC: 107 MMOL/L (ref 98–107)
CO2 SERPL-SCNC: 24 MMOL/L (ref 21–32)
CREAT SERPL-MCNC: 0.85 MG/DL (ref 0.5–1.05)
EGFRCR SERPLBLD CKD-EPI 2021: 75 ML/MIN/1.73M*2
ERYTHROCYTE [DISTWIDTH] IN BLOOD BY AUTOMATED COUNT: 13.2 % (ref 11.5–14.5)
GLUCOSE SERPL-MCNC: 92 MG/DL (ref 74–99)
HCT VFR BLD AUTO: 33 % (ref 36–46)
HGB BLD-MCNC: 10.5 G/DL (ref 12–16)
MCH RBC QN AUTO: 29.6 PG (ref 26–34)
MCHC RBC AUTO-ENTMCNC: 31.8 G/DL (ref 32–36)
MCV RBC AUTO: 93 FL (ref 80–100)
NRBC BLD-RTO: 0 /100 WBCS (ref 0–0)
PLATELET # BLD AUTO: 187 X10*3/UL (ref 150–450)
POTASSIUM SERPL-SCNC: 3.8 MMOL/L (ref 3.5–5.3)
PROT SERPL-MCNC: 6.6 G/DL (ref 6.4–8.2)
RBC # BLD AUTO: 3.55 X10*6/UL (ref 4–5.2)
SODIUM SERPL-SCNC: 138 MMOL/L (ref 136–145)
WBC # BLD AUTO: 4.4 X10*3/UL (ref 4.4–11.3)

## 2025-09-01 PROCEDURE — 80053 COMPREHEN METABOLIC PANEL: CPT | Performed by: STUDENT IN AN ORGANIZED HEALTH CARE EDUCATION/TRAINING PROGRAM

## 2025-09-01 PROCEDURE — 36415 COLL VENOUS BLD VENIPUNCTURE: CPT | Performed by: STUDENT IN AN ORGANIZED HEALTH CARE EDUCATION/TRAINING PROGRAM

## 2025-09-01 PROCEDURE — 85027 COMPLETE CBC AUTOMATED: CPT | Performed by: STUDENT IN AN ORGANIZED HEALTH CARE EDUCATION/TRAINING PROGRAM

## 2025-09-01 PROCEDURE — 1210000001 HC SEMI-PRIVATE ROOM DAILY

## 2025-09-01 PROCEDURE — 99223 1ST HOSP IP/OBS HIGH 75: CPT | Performed by: STUDENT IN AN ORGANIZED HEALTH CARE EDUCATION/TRAINING PROGRAM

## 2025-09-01 RX ORDER — AMLODIPINE BESYLATE 10 MG/1
10 TABLET ORAL DAILY
Status: DISPENSED | OUTPATIENT
Start: 2025-09-02

## 2025-09-01 RX ORDER — ROSUVASTATIN CALCIUM 10 MG/1
10 TABLET, COATED ORAL DAILY
Status: DISPENSED | OUTPATIENT
Start: 2025-09-02

## 2025-09-01 RX ORDER — CARVEDILOL 12.5 MG/1
12.5 TABLET ORAL DAILY
Status: DISPENSED | OUTPATIENT
Start: 2025-09-02

## 2025-09-01 RX ORDER — CEFTRIAXONE 1 G/50ML
1 INJECTION, SOLUTION INTRAVENOUS EVERY 24 HOURS
Status: DISCONTINUED | OUTPATIENT
Start: 2025-09-02 | End: 2025-09-03

## 2025-09-01 RX ORDER — HYDROXYZINE HYDROCHLORIDE 25 MG/1
25 TABLET, FILM COATED ORAL EVERY 8 HOURS PRN
Status: DISPENSED | OUTPATIENT
Start: 2025-09-01

## 2025-09-01 RX ORDER — AMOXICILLIN 250 MG
2 CAPSULE ORAL 2 TIMES DAILY PRN
Status: ACTIVE | OUTPATIENT
Start: 2025-09-01

## 2025-09-01 RX ORDER — ACETAMINOPHEN 500 MG
5 TABLET ORAL NIGHTLY PRN
Status: DISPENSED | OUTPATIENT
Start: 2025-09-01

## 2025-09-01 RX ORDER — PANTOPRAZOLE SODIUM 40 MG/1
40 TABLET, DELAYED RELEASE ORAL
Status: DISPENSED | OUTPATIENT
Start: 2025-09-02

## 2025-09-01 RX ORDER — ALBUTEROL SULFATE 90 UG/1
2 INHALANT RESPIRATORY (INHALATION) EVERY 4 HOURS PRN
Status: DISCONTINUED | OUTPATIENT
Start: 2025-09-01 | End: 2025-09-02

## 2025-09-01 RX ORDER — ACETAMINOPHEN 325 MG/1
650 TABLET ORAL EVERY 6 HOURS PRN
Status: ACTIVE | OUTPATIENT
Start: 2025-09-01

## 2025-09-01 RX ORDER — ENOXAPARIN SODIUM 100 MG/ML
40 INJECTION SUBCUTANEOUS EVERY 24 HOURS
Status: DISPENSED | OUTPATIENT
Start: 2025-09-02

## 2025-09-01 RX ORDER — FORMOTEROL FUMARATE 20 UG/2ML
20 SOLUTION RESPIRATORY (INHALATION)
Status: DISCONTINUED | OUTPATIENT
Start: 2025-09-01 | End: 2025-09-02

## 2025-09-01 RX ORDER — SERTRALINE HYDROCHLORIDE 50 MG/1
50 TABLET, FILM COATED ORAL DAILY
Status: DISPENSED | OUTPATIENT
Start: 2025-09-02

## 2025-09-01 SDOH — SOCIAL STABILITY: SOCIAL NETWORK
DO YOU BELONG TO ANY CLUBS OR ORGANIZATIONS SUCH AS CHURCH GROUPS, UNIONS, FRATERNAL OR ATHLETIC GROUPS, OR SCHOOL GROUPS?: NO

## 2025-09-01 SDOH — SOCIAL STABILITY: SOCIAL NETWORK: HOW OFTEN DO YOU ATTEND CHURCH OR RELIGIOUS SERVICES?: NEVER

## 2025-09-01 SDOH — HEALTH STABILITY: PHYSICAL HEALTH: ON AVERAGE, HOW MANY DAYS PER WEEK DO YOU ENGAGE IN MODERATE TO STRENUOUS EXERCISE (LIKE A BRISK WALK)?: 0 DAYS

## 2025-09-01 SDOH — ECONOMIC STABILITY: FOOD INSECURITY: WITHIN THE PAST 12 MONTHS, YOU WORRIED THAT YOUR FOOD WOULD RUN OUT BEFORE YOU GOT THE MONEY TO BUY MORE.: NEVER TRUE

## 2025-09-01 SDOH — SOCIAL STABILITY: SOCIAL NETWORK: IN A TYPICAL WEEK, HOW MANY TIMES DO YOU TALK ON THE PHONE WITH FAMILY, FRIENDS, OR NEIGHBORS?: ONCE A WEEK

## 2025-09-01 SDOH — SOCIAL STABILITY: SOCIAL NETWORK: HOW OFTEN DO YOU GET TOGETHER WITH FRIENDS OR RELATIVES?: ONCE A WEEK

## 2025-09-01 SDOH — SOCIAL STABILITY: SOCIAL INSECURITY: DO YOU FEEL UNSAFE GOING BACK TO THE PLACE WHERE YOU ARE LIVING?: NO

## 2025-09-01 SDOH — SOCIAL STABILITY: SOCIAL INSECURITY: HAVE YOU HAD ANY THOUGHTS OF HARMING ANYONE ELSE?: NO

## 2025-09-01 SDOH — HEALTH STABILITY: PHYSICAL HEALTH
HOW OFTEN DO YOU NEED TO HAVE SOMEONE HELP YOU WHEN YOU READ INSTRUCTIONS, PAMPHLETS, OR OTHER WRITTEN MATERIAL FROM YOUR DOCTOR OR PHARMACY?: NEVER

## 2025-09-01 SDOH — SOCIAL STABILITY: SOCIAL INSECURITY: DO YOU FEEL ANYONE HAS EXPLOITED OR TAKEN ADVANTAGE OF YOU FINANCIALLY OR OF YOUR PERSONAL PROPERTY?: NO

## 2025-09-01 SDOH — SOCIAL STABILITY: SOCIAL NETWORK: HOW OFTEN DO YOU ATTEND MEETINGS OF THE CLUBS OR ORGANIZATIONS YOU BELONG TO?: NEVER

## 2025-09-01 SDOH — SOCIAL STABILITY: SOCIAL INSECURITY: WERE YOU ABLE TO COMPLETE ALL THE BEHAVIORAL HEALTH SCREENINGS?: YES

## 2025-09-01 SDOH — SOCIAL STABILITY: SOCIAL INSECURITY: ABUSE: ADULT

## 2025-09-01 SDOH — SOCIAL STABILITY: SOCIAL INSECURITY: WITHIN THE LAST YEAR, HAVE YOU BEEN AFRAID OF YOUR PARTNER OR EX-PARTNER?: NO

## 2025-09-01 SDOH — ECONOMIC STABILITY: INCOME INSECURITY: IN THE PAST 12 MONTHS HAS THE ELECTRIC, GAS, OIL, OR WATER COMPANY THREATENED TO SHUT OFF SERVICES IN YOUR HOME?: NO

## 2025-09-01 SDOH — SOCIAL STABILITY: SOCIAL INSECURITY: WITHIN THE LAST YEAR, HAVE YOU BEEN HUMILIATED OR EMOTIONALLY ABUSED IN OTHER WAYS BY YOUR PARTNER OR EX-PARTNER?: NO

## 2025-09-01 SDOH — SOCIAL STABILITY: SOCIAL INSECURITY: HAVE YOU HAD THOUGHTS OF HARMING ANYONE ELSE?: NO

## 2025-09-01 SDOH — SOCIAL STABILITY: SOCIAL INSECURITY: ARE THERE ANY APPARENT SIGNS OF INJURIES/BEHAVIORS THAT COULD BE RELATED TO ABUSE/NEGLECT?: NO

## 2025-09-01 SDOH — SOCIAL STABILITY: SOCIAL INSECURITY: ARE YOU MARRIED, WIDOWED, DIVORCED, SEPARATED, NEVER MARRIED, OR LIVING WITH A PARTNER?: DIVORCED

## 2025-09-01 SDOH — ECONOMIC STABILITY: FOOD INSECURITY: WITHIN THE PAST 12 MONTHS, THE FOOD YOU BOUGHT JUST DIDN'T LAST AND YOU DIDN'T HAVE MONEY TO GET MORE.: NEVER TRUE

## 2025-09-01 SDOH — SOCIAL STABILITY: SOCIAL INSECURITY: ARE YOU OR HAVE YOU BEEN THREATENED OR ABUSED PHYSICALLY, EMOTIONALLY, OR SEXUALLY BY ANYONE?: NO

## 2025-09-01 SDOH — SOCIAL STABILITY: SOCIAL INSECURITY: DOES ANYONE TRY TO KEEP YOU FROM HAVING/CONTACTING OTHER FRIENDS OR DOING THINGS OUTSIDE YOUR HOME?: NO

## 2025-09-01 SDOH — SOCIAL STABILITY: SOCIAL INSECURITY: HAS ANYONE EVER THREATENED TO HURT YOUR FAMILY OR YOUR PETS?: NO

## 2025-09-01 ASSESSMENT — COGNITIVE AND FUNCTIONAL STATUS - GENERAL
DAILY ACTIVITIY SCORE: 19
DRESSING REGULAR UPPER BODY CLOTHING: A LITTLE
WALKING IN HOSPITAL ROOM: A LITTLE
PATIENT BASELINE BEDBOUND: NO
STANDING UP FROM CHAIR USING ARMS: A LITTLE
HELP NEEDED FOR BATHING: A LITTLE
MOBILITY SCORE: 19
PERSONAL GROOMING: A LITTLE
MOVING TO AND FROM BED TO CHAIR: A LITTLE
TURNING FROM BACK TO SIDE WHILE IN FLAT BAD: A LITTLE
CLIMB 3 TO 5 STEPS WITH RAILING: A LITTLE
TOILETING: A LITTLE
DRESSING REGULAR LOWER BODY CLOTHING: A LITTLE

## 2025-09-01 ASSESSMENT — ACTIVITIES OF DAILY LIVING (ADL)
FEEDING YOURSELF: INDEPENDENT
HEARING - LEFT EAR: DIFFICULTY WITH NOISE
ADEQUATE_TO_COMPLETE_ADL: YES
LACK_OF_TRANSPORTATION: NO
BATHING: NEEDS ASSISTANCE
JUDGMENT_ADEQUATE_SAFELY_COMPLETE_DAILY_ACTIVITIES: YES
PATIENT'S MEMORY ADEQUATE TO SAFELY COMPLETE DAILY ACTIVITIES?: YES
TOILETING: NEEDS ASSISTANCE
HEARING - RIGHT EAR: DIFFICULTY WITH NOISE
ASSISTIVE_DEVICE: CANE;WALKER
WALKS IN HOME: INDEPENDENT
GROOMING: INDEPENDENT
DRESSING YOURSELF: NEEDS ASSISTANCE

## 2025-09-01 ASSESSMENT — LIFESTYLE VARIABLES
HOW OFTEN DO YOU HAVE 6 OR MORE DRINKS ON ONE OCCASION: NEVER
HOW MANY STANDARD DRINKS CONTAINING ALCOHOL DO YOU HAVE ON A TYPICAL DAY: 1 OR 2
SKIP TO QUESTIONS 9-10: 1
SUBSTANCE_ABUSE_PAST_12_MONTHS: NO
AUDIT-C TOTAL SCORE: 1
AUDIT-C TOTAL SCORE: 1
PRESCIPTION_ABUSE_PAST_12_MONTHS: NO
HOW OFTEN DO YOU HAVE A DRINK CONTAINING ALCOHOL: MONTHLY OR LESS

## 2025-09-01 ASSESSMENT — PATIENT HEALTH QUESTIONNAIRE - PHQ9
SUM OF ALL RESPONSES TO PHQ9 QUESTIONS 1 & 2: 0
1. LITTLE INTEREST OR PLEASURE IN DOING THINGS: NOT AT ALL
2. FEELING DOWN, DEPRESSED OR HOPELESS: NOT AT ALL

## 2025-09-01 ASSESSMENT — PAIN SCALES - GENERAL: PAINLEVEL_OUTOF10: 2

## 2025-09-01 ASSESSMENT — PAIN DESCRIPTION - DESCRIPTORS: DESCRIPTORS: DISCOMFORT

## 2025-09-01 ASSESSMENT — PAIN - FUNCTIONAL ASSESSMENT: PAIN_FUNCTIONAL_ASSESSMENT: 0-10

## 2025-09-02 LAB
GLUCOSE BLD MANUAL STRIP-MCNC: 98 MG/DL (ref 74–99)
HOLD SPECIMEN: NORMAL

## 2025-09-02 PROCEDURE — 99232 SBSQ HOSP IP/OBS MODERATE 35: CPT

## 2025-09-02 PROCEDURE — 97165 OT EVAL LOW COMPLEX 30 MIN: CPT | Mod: GO

## 2025-09-02 PROCEDURE — 97161 PT EVAL LOW COMPLEX 20 MIN: CPT | Mod: GP

## 2025-09-02 PROCEDURE — 2500000004 HC RX 250 GENERAL PHARMACY W/ HCPCS (ALT 636 FOR OP/ED): Performed by: STUDENT IN AN ORGANIZED HEALTH CARE EDUCATION/TRAINING PROGRAM

## 2025-09-02 PROCEDURE — 82947 ASSAY GLUCOSE BLOOD QUANT: CPT

## 2025-09-02 PROCEDURE — 94640 AIRWAY INHALATION TREATMENT: CPT

## 2025-09-02 PROCEDURE — 2500000001 HC RX 250 WO HCPCS SELF ADMINISTERED DRUGS (ALT 637 FOR MEDICARE OP): Performed by: STUDENT IN AN ORGANIZED HEALTH CARE EDUCATION/TRAINING PROGRAM

## 2025-09-02 PROCEDURE — 2500000002 HC RX 250 W HCPCS SELF ADMINISTERED DRUGS (ALT 637 FOR MEDICARE OP, ALT 636 FOR OP/ED): Performed by: STUDENT IN AN ORGANIZED HEALTH CARE EDUCATION/TRAINING PROGRAM

## 2025-09-02 PROCEDURE — 1210000001 HC SEMI-PRIVATE ROOM DAILY

## 2025-09-02 RX ORDER — ALBUTEROL SULFATE 90 UG/1
2 INHALANT RESPIRATORY (INHALATION) EVERY 2 HOUR PRN
Status: ACTIVE | OUTPATIENT
Start: 2025-09-02

## 2025-09-02 RX ORDER — FORMOTEROL FUMARATE 20 UG/2ML
20 SOLUTION RESPIRATORY (INHALATION)
Status: DISPENSED | OUTPATIENT
Start: 2025-09-02

## 2025-09-02 RX ADMIN — PANTOPRAZOLE SODIUM 40 MG: 40 TABLET, DELAYED RELEASE ORAL at 06:48

## 2025-09-02 RX ADMIN — SERTRALINE HYDROCHLORIDE 50 MG: 50 TABLET ORAL at 08:41

## 2025-09-02 RX ADMIN — ENOXAPARIN SODIUM 40 MG: 100 INJECTION SUBCUTANEOUS at 08:41

## 2025-09-02 RX ADMIN — AMLODIPINE BESYLATE 10 MG: 10 TABLET ORAL at 08:41

## 2025-09-02 RX ADMIN — TIOTROPIUM BROMIDE INHALATION SPRAY 2 PUFF: 3.12 SPRAY, METERED RESPIRATORY (INHALATION) at 06:32

## 2025-09-02 RX ADMIN — FORMOTEROL FUMARATE 20 MCG: 20 SOLUTION RESPIRATORY (INHALATION) at 06:36

## 2025-09-02 RX ADMIN — CEFTRIAXONE 1 G: 1 INJECTION, SOLUTION INTRAVENOUS at 00:24

## 2025-09-02 RX ADMIN — ROSUVASTATIN CALCIUM 10 MG: 10 TABLET, FILM COATED ORAL at 08:41

## 2025-09-02 RX ADMIN — CARVEDILOL 12.5 MG: 12.5 TABLET, FILM COATED ORAL at 08:41

## 2025-09-02 RX ADMIN — FORMOTEROL FUMARATE 20 MCG: 20 SOLUTION RESPIRATORY (INHALATION) at 19:27

## 2025-09-02 ASSESSMENT — COGNITIVE AND FUNCTIONAL STATUS - GENERAL
MOVING TO AND FROM BED TO CHAIR: A LITTLE
DRESSING REGULAR UPPER BODY CLOTHING: A LITTLE
HELP NEEDED FOR BATHING: A LOT
TURNING FROM BACK TO SIDE WHILE IN FLAT BAD: A LITTLE
DAILY ACTIVITIY SCORE: 16
EATING MEALS: A LITTLE
CLIMB 3 TO 5 STEPS WITH RAILING: TOTAL
WALKING IN HOSPITAL ROOM: A LITTLE
STANDING UP FROM CHAIR USING ARMS: A LITTLE
MOVING FROM LYING ON BACK TO SITTING ON SIDE OF FLAT BED WITH BEDRAILS: A LITTLE
PERSONAL GROOMING: A LITTLE
MOBILITY SCORE: 16
TOILETING: A LOT
DRESSING REGULAR LOWER BODY CLOTHING: A LITTLE

## 2025-09-02 ASSESSMENT — PAIN - FUNCTIONAL ASSESSMENT
PAIN_FUNCTIONAL_ASSESSMENT: 0-10

## 2025-09-02 ASSESSMENT — PAIN SCALES - GENERAL
PAINLEVEL_OUTOF10: 0 - NO PAIN

## 2025-09-03 PROBLEM — R10.9 ABDOMINAL PAIN: Status: RESOLVED | Noted: 2023-10-04 | Resolved: 2025-09-03

## 2025-09-03 PROBLEM — C18.9 ADENOCARCINOMA, COLON: Status: RESOLVED | Noted: 2023-10-04 | Resolved: 2025-09-03

## 2025-09-03 LAB
ALBUMIN SERPL BCP-MCNC: 3.3 G/DL (ref 3.4–5)
ALP SERPL-CCNC: 36 U/L (ref 33–136)
ALT SERPL W P-5'-P-CCNC: 5 U/L (ref 7–45)
ANION GAP SERPL CALC-SCNC: 10 MMOL/L (ref 10–20)
AORTIC VALVE MEAN GRADIENT: 3 MMHG
AORTIC VALVE PEAK VELOCITY: 1.17 M/S
APPEARANCE UR: ABNORMAL
AST SERPL W P-5'-P-CCNC: 10 U/L (ref 9–39)
AV PEAK GRADIENT: 5 MMHG
AVA (PEAK VEL): 2.2 CM2
AVA (VTI): 2.11 CM2
BACTERIA #/AREA URNS AUTO: ABNORMAL /HPF
BILIRUB SERPL-MCNC: 0.3 MG/DL (ref 0–1.2)
BILIRUB UR STRIP.AUTO-MCNC: NEGATIVE MG/DL
BUN SERPL-MCNC: 7 MG/DL (ref 6–23)
CALCIUM SERPL-MCNC: 8.6 MG/DL (ref 8.6–10.3)
CHLORIDE SERPL-SCNC: 106 MMOL/L (ref 98–107)
CO2 SERPL-SCNC: 25 MMOL/L (ref 21–32)
COLOR UR: YELLOW
CREAT SERPL-MCNC: 0.83 MG/DL (ref 0.5–1.05)
EGFRCR SERPLBLD CKD-EPI 2021: 77 ML/MIN/1.73M*2
EJECTION FRACTION APICAL 4 CHAMBER: 63.9
EJECTION FRACTION: 58 %
ERYTHROCYTE [DISTWIDTH] IN BLOOD BY AUTOMATED COUNT: 13.1 % (ref 11.5–14.5)
GLUCOSE SERPL-MCNC: 100 MG/DL (ref 74–99)
GLUCOSE UR STRIP.AUTO-MCNC: NORMAL MG/DL
HCT VFR BLD AUTO: 31.5 % (ref 36–46)
HGB BLD-MCNC: 9.8 G/DL (ref 12–16)
KETONES UR STRIP.AUTO-MCNC: NEGATIVE MG/DL
LEFT ATRIUM VOLUME AREA LENGTH INDEX BSA: 20.8 ML/M2
LEFT VENTRICLE INTERNAL DIMENSION DIASTOLE: 4.4 CM (ref 3.5–6)
LEFT VENTRICULAR OUTFLOW TRACT DIAMETER: 2 CM
LEUKOCYTE ESTERASE UR QL STRIP.AUTO: ABNORMAL
MAGNESIUM SERPL-MCNC: 1.85 MG/DL (ref 1.6–2.4)
MCH RBC QN AUTO: 29.2 PG (ref 26–34)
MCHC RBC AUTO-ENTMCNC: 31.1 G/DL (ref 32–36)
MCV RBC AUTO: 94 FL (ref 80–100)
MITRAL VALVE E/A RATIO: 0.75
NITRITE UR QL STRIP.AUTO: ABNORMAL
NRBC BLD-RTO: 0 /100 WBCS (ref 0–0)
PH UR STRIP.AUTO: 6 [PH]
PLATELET # BLD AUTO: 167 X10*3/UL (ref 150–450)
POTASSIUM SERPL-SCNC: 3.8 MMOL/L (ref 3.5–5.3)
PROT SERPL-MCNC: 6 G/DL (ref 6.4–8.2)
PROT UR STRIP.AUTO-MCNC: ABNORMAL MG/DL
RBC # BLD AUTO: 3.36 X10*6/UL (ref 4–5.2)
RBC # UR STRIP.AUTO: NEGATIVE MG/DL
RBC #/AREA URNS AUTO: ABNORMAL /HPF
RIGHT VENTRICLE FREE WALL PEAK S': 8.59 CM/S
RIGHT VENTRICLE PEAK SYSTOLIC PRESSURE: 25 MMHG
SODIUM SERPL-SCNC: 137 MMOL/L (ref 136–145)
SP GR UR STRIP.AUTO: 1.02
SQUAMOUS #/AREA URNS AUTO: ABNORMAL /HPF
TRICUSPID ANNULAR PLANE SYSTOLIC EXCURSION: 1.9 CM
UROBILINOGEN UR STRIP.AUTO-MCNC: NORMAL MG/DL
WBC # BLD AUTO: 4.6 X10*3/UL (ref 4.4–11.3)
WBC #/AREA URNS AUTO: >50 /HPF
WBC CLUMPS #/AREA URNS AUTO: ABNORMAL /HPF

## 2025-09-03 PROCEDURE — 2500000001 HC RX 250 WO HCPCS SELF ADMINISTERED DRUGS (ALT 637 FOR MEDICARE OP): Performed by: STUDENT IN AN ORGANIZED HEALTH CARE EDUCATION/TRAINING PROGRAM

## 2025-09-03 PROCEDURE — 2500000002 HC RX 250 W HCPCS SELF ADMINISTERED DRUGS (ALT 637 FOR MEDICARE OP, ALT 636 FOR OP/ED): Performed by: STUDENT IN AN ORGANIZED HEALTH CARE EDUCATION/TRAINING PROGRAM

## 2025-09-03 PROCEDURE — 83735 ASSAY OF MAGNESIUM: CPT

## 2025-09-03 PROCEDURE — 2500000001 HC RX 250 WO HCPCS SELF ADMINISTERED DRUGS (ALT 637 FOR MEDICARE OP)

## 2025-09-03 PROCEDURE — 80053 COMPREHEN METABOLIC PANEL: CPT

## 2025-09-03 PROCEDURE — 81001 URINALYSIS AUTO W/SCOPE: CPT

## 2025-09-03 PROCEDURE — 99232 SBSQ HOSP IP/OBS MODERATE 35: CPT

## 2025-09-03 PROCEDURE — 87086 URINE CULTURE/COLONY COUNT: CPT | Mod: PARLAB

## 2025-09-03 PROCEDURE — 85027 COMPLETE CBC AUTOMATED: CPT

## 2025-09-03 PROCEDURE — 1210000001 HC SEMI-PRIVATE ROOM DAILY

## 2025-09-03 PROCEDURE — 94640 AIRWAY INHALATION TREATMENT: CPT

## 2025-09-03 PROCEDURE — 2500000004 HC RX 250 GENERAL PHARMACY W/ HCPCS (ALT 636 FOR OP/ED): Performed by: STUDENT IN AN ORGANIZED HEALTH CARE EDUCATION/TRAINING PROGRAM

## 2025-09-03 PROCEDURE — 36415 COLL VENOUS BLD VENIPUNCTURE: CPT

## 2025-09-03 RX ORDER — CEFUROXIME AXETIL 250 MG/1
250 TABLET ORAL 2 TIMES DAILY
Status: COMPLETED | OUTPATIENT
Start: 2025-09-03 | End: 2025-09-07

## 2025-09-03 RX ADMIN — SERTRALINE HYDROCHLORIDE 50 MG: 50 TABLET ORAL at 10:16

## 2025-09-03 RX ADMIN — FORMOTEROL FUMARATE 20 MCG: 20 SOLUTION RESPIRATORY (INHALATION) at 07:07

## 2025-09-03 RX ADMIN — CEFUROXIME AXETIL 250 MG: 250 TABLET, FILM COATED ORAL at 10:16

## 2025-09-03 RX ADMIN — AMLODIPINE BESYLATE 10 MG: 10 TABLET ORAL at 10:16

## 2025-09-03 RX ADMIN — CEFUROXIME AXETIL 250 MG: 250 TABLET, FILM COATED ORAL at 20:13

## 2025-09-03 RX ADMIN — CARVEDILOL 12.5 MG: 12.5 TABLET, FILM COATED ORAL at 10:16

## 2025-09-03 RX ADMIN — PANTOPRAZOLE SODIUM 40 MG: 40 TABLET, DELAYED RELEASE ORAL at 06:45

## 2025-09-03 RX ADMIN — HYDROXYZINE HYDROCHLORIDE 25 MG: 25 TABLET, FILM COATED ORAL at 20:15

## 2025-09-03 RX ADMIN — TIOTROPIUM BROMIDE INHALATION SPRAY 2 PUFF: 3.12 SPRAY, METERED RESPIRATORY (INHALATION) at 07:09

## 2025-09-03 RX ADMIN — ROSUVASTATIN CALCIUM 10 MG: 10 TABLET, FILM COATED ORAL at 10:16

## 2025-09-03 RX ADMIN — ENOXAPARIN SODIUM 40 MG: 100 INJECTION SUBCUTANEOUS at 10:16

## 2025-09-03 RX ADMIN — Medication 5 MG: at 20:15

## 2025-09-03 RX ADMIN — CEFTRIAXONE 1 G: 1 INJECTION, SOLUTION INTRAVENOUS at 00:56

## 2025-09-03 ASSESSMENT — COGNITIVE AND FUNCTIONAL STATUS - GENERAL
DRESSING REGULAR LOWER BODY CLOTHING: A LITTLE
WALKING IN HOSPITAL ROOM: A LITTLE
DRESSING REGULAR UPPER BODY CLOTHING: A LITTLE
HELP NEEDED FOR BATHING: A LITTLE
MOBILITY SCORE: 19
TURNING FROM BACK TO SIDE WHILE IN FLAT BAD: A LITTLE
DAILY ACTIVITIY SCORE: 19
TOILETING: A LITTLE
CLIMB 3 TO 5 STEPS WITH RAILING: A LITTLE
STANDING UP FROM CHAIR USING ARMS: A LITTLE
PERSONAL GROOMING: A LITTLE
MOVING TO AND FROM BED TO CHAIR: A LITTLE

## 2025-09-03 ASSESSMENT — PAIN SCALES - GENERAL
PAINLEVEL_OUTOF10: 0 - NO PAIN
PAINLEVEL_OUTOF10: 0 - NO PAIN

## 2025-09-04 LAB
ALBUMIN SERPL BCP-MCNC: 3.3 G/DL (ref 3.4–5)
ALP SERPL-CCNC: 39 U/L (ref 33–136)
ALT SERPL W P-5'-P-CCNC: 5 U/L (ref 7–45)
ANION GAP SERPL CALC-SCNC: 10 MMOL/L (ref 10–20)
AST SERPL W P-5'-P-CCNC: 10 U/L (ref 9–39)
BACTERIA UR CULT: NO GROWTH
BILIRUB SERPL-MCNC: 0.3 MG/DL (ref 0–1.2)
BUN SERPL-MCNC: 8 MG/DL (ref 6–23)
CALCIUM SERPL-MCNC: 8.5 MG/DL (ref 8.6–10.3)
CHLORIDE SERPL-SCNC: 105 MMOL/L (ref 98–107)
CO2 SERPL-SCNC: 25 MMOL/L (ref 21–32)
CREAT SERPL-MCNC: 0.82 MG/DL (ref 0.5–1.05)
EGFRCR SERPLBLD CKD-EPI 2021: 79 ML/MIN/1.73M*2
ERYTHROCYTE [DISTWIDTH] IN BLOOD BY AUTOMATED COUNT: 13 % (ref 11.5–14.5)
GLUCOSE SERPL-MCNC: 102 MG/DL (ref 74–99)
HCT VFR BLD AUTO: 30.9 % (ref 36–46)
HGB BLD-MCNC: 9.8 G/DL (ref 12–16)
HOLD SPECIMEN: NORMAL
MAGNESIUM SERPL-MCNC: 1.81 MG/DL (ref 1.6–2.4)
MCH RBC QN AUTO: 29.4 PG (ref 26–34)
MCHC RBC AUTO-ENTMCNC: 31.7 G/DL (ref 32–36)
MCV RBC AUTO: 93 FL (ref 80–100)
NRBC BLD-RTO: 0 /100 WBCS (ref 0–0)
PLATELET # BLD AUTO: 181 X10*3/UL (ref 150–450)
POTASSIUM SERPL-SCNC: 4 MMOL/L (ref 3.5–5.3)
PROT SERPL-MCNC: 5.9 G/DL (ref 6.4–8.2)
RBC # BLD AUTO: 3.33 X10*6/UL (ref 4–5.2)
SODIUM SERPL-SCNC: 136 MMOL/L (ref 136–145)
WBC # BLD AUTO: 4 X10*3/UL (ref 4.4–11.3)

## 2025-09-04 PROCEDURE — 94640 AIRWAY INHALATION TREATMENT: CPT

## 2025-09-04 PROCEDURE — 1210000001 HC SEMI-PRIVATE ROOM DAILY

## 2025-09-04 PROCEDURE — 2500000001 HC RX 250 WO HCPCS SELF ADMINISTERED DRUGS (ALT 637 FOR MEDICARE OP): Performed by: STUDENT IN AN ORGANIZED HEALTH CARE EDUCATION/TRAINING PROGRAM

## 2025-09-04 PROCEDURE — 2500000004 HC RX 250 GENERAL PHARMACY W/ HCPCS (ALT 636 FOR OP/ED): Performed by: STUDENT IN AN ORGANIZED HEALTH CARE EDUCATION/TRAINING PROGRAM

## 2025-09-04 PROCEDURE — 85027 COMPLETE CBC AUTOMATED: CPT

## 2025-09-04 PROCEDURE — 99239 HOSP IP/OBS DSCHRG MGMT >30: CPT

## 2025-09-04 PROCEDURE — 2500000001 HC RX 250 WO HCPCS SELF ADMINISTERED DRUGS (ALT 637 FOR MEDICARE OP)

## 2025-09-04 PROCEDURE — 2500000002 HC RX 250 W HCPCS SELF ADMINISTERED DRUGS (ALT 637 FOR MEDICARE OP, ALT 636 FOR OP/ED): Performed by: STUDENT IN AN ORGANIZED HEALTH CARE EDUCATION/TRAINING PROGRAM

## 2025-09-04 PROCEDURE — 80053 COMPREHEN METABOLIC PANEL: CPT

## 2025-09-04 PROCEDURE — 36415 COLL VENOUS BLD VENIPUNCTURE: CPT

## 2025-09-04 PROCEDURE — 83735 ASSAY OF MAGNESIUM: CPT

## 2025-09-04 RX ORDER — CEFUROXIME AXETIL 250 MG/1
250 TABLET ORAL 2 TIMES DAILY
Start: 2025-09-04 | End: 2025-09-07 | Stop reason: HOSPADM

## 2025-09-04 RX ORDER — CEFUROXIME AXETIL 250 MG/1
250 TABLET ORAL 2 TIMES DAILY
Qty: 8 TABLET | Refills: 0 | Status: SHIPPED | OUTPATIENT
Start: 2025-09-04 | End: 2025-09-04

## 2025-09-04 RX ADMIN — ENOXAPARIN SODIUM 40 MG: 100 INJECTION SUBCUTANEOUS at 08:07

## 2025-09-04 RX ADMIN — CEFUROXIME AXETIL 250 MG: 250 TABLET, FILM COATED ORAL at 22:00

## 2025-09-04 RX ADMIN — FORMOTEROL FUMARATE 20 MCG: 20 SOLUTION RESPIRATORY (INHALATION) at 19:23

## 2025-09-04 RX ADMIN — ROSUVASTATIN CALCIUM 10 MG: 10 TABLET, FILM COATED ORAL at 08:07

## 2025-09-04 RX ADMIN — PANTOPRAZOLE SODIUM 40 MG: 40 TABLET, DELAYED RELEASE ORAL at 06:26

## 2025-09-04 RX ADMIN — AMLODIPINE BESYLATE 10 MG: 10 TABLET ORAL at 08:06

## 2025-09-04 RX ADMIN — TIOTROPIUM BROMIDE INHALATION SPRAY 2 PUFF: 3.12 SPRAY, METERED RESPIRATORY (INHALATION) at 07:24

## 2025-09-04 RX ADMIN — FORMOTEROL FUMARATE 20 MCG: 20 SOLUTION RESPIRATORY (INHALATION) at 07:21

## 2025-09-04 RX ADMIN — SERTRALINE HYDROCHLORIDE 50 MG: 50 TABLET ORAL at 08:06

## 2025-09-04 RX ADMIN — CEFUROXIME AXETIL 250 MG: 250 TABLET, FILM COATED ORAL at 08:07

## 2025-09-04 RX ADMIN — CARVEDILOL 12.5 MG: 12.5 TABLET, FILM COATED ORAL at 08:06

## 2025-09-04 ASSESSMENT — COGNITIVE AND FUNCTIONAL STATUS - GENERAL
CLIMB 3 TO 5 STEPS WITH RAILING: A LITTLE
TURNING FROM BACK TO SIDE WHILE IN FLAT BAD: A LITTLE
DRESSING REGULAR UPPER BODY CLOTHING: A LITTLE
MOBILITY SCORE: 18
PERSONAL GROOMING: A LITTLE
MOVING TO AND FROM BED TO CHAIR: A LITTLE
TOILETING: A LITTLE
DAILY ACTIVITIY SCORE: 21
STANDING UP FROM CHAIR USING ARMS: A LITTLE
MOVING FROM LYING ON BACK TO SITTING ON SIDE OF FLAT BED WITH BEDRAILS: A LITTLE
WALKING IN HOSPITAL ROOM: A LITTLE

## 2025-09-04 ASSESSMENT — PAIN SCALES - GENERAL
PAINLEVEL_OUTOF10: 0 - NO PAIN
PAINLEVEL_OUTOF10: 0 - NO PAIN

## 2025-09-04 ASSESSMENT — PAIN - FUNCTIONAL ASSESSMENT: PAIN_FUNCTIONAL_ASSESSMENT: 0-10

## 2025-09-05 LAB
ALBUMIN SERPL BCP-MCNC: 3.7 G/DL (ref 3.4–5)
ALP SERPL-CCNC: 44 U/L (ref 33–136)
ALT SERPL W P-5'-P-CCNC: 6 U/L (ref 7–45)
ANION GAP SERPL CALC-SCNC: 10 MMOL/L (ref 10–20)
AST SERPL W P-5'-P-CCNC: 11 U/L (ref 9–39)
BILIRUB SERPL-MCNC: 0.4 MG/DL (ref 0–1.2)
BUN SERPL-MCNC: 8 MG/DL (ref 6–23)
CALCIUM SERPL-MCNC: 9 MG/DL (ref 8.6–10.3)
CHLORIDE SERPL-SCNC: 103 MMOL/L (ref 98–107)
CO2 SERPL-SCNC: 27 MMOL/L (ref 21–32)
CREAT SERPL-MCNC: 0.85 MG/DL (ref 0.5–1.05)
EGFRCR SERPLBLD CKD-EPI 2021: 75 ML/MIN/1.73M*2
ERYTHROCYTE [DISTWIDTH] IN BLOOD BY AUTOMATED COUNT: 13 % (ref 11.5–14.5)
GLUCOSE SERPL-MCNC: 94 MG/DL (ref 74–99)
HCT VFR BLD AUTO: 36.3 % (ref 36–46)
HGB BLD-MCNC: 11 G/DL (ref 12–16)
MAGNESIUM SERPL-MCNC: 1.87 MG/DL (ref 1.6–2.4)
MCH RBC QN AUTO: 28.6 PG (ref 26–34)
MCHC RBC AUTO-ENTMCNC: 30.3 G/DL (ref 32–36)
MCV RBC AUTO: 94 FL (ref 80–100)
NRBC BLD-RTO: 0 /100 WBCS (ref 0–0)
PLATELET # BLD AUTO: 213 X10*3/UL (ref 150–450)
POTASSIUM SERPL-SCNC: 4.2 MMOL/L (ref 3.5–5.3)
PROT SERPL-MCNC: 6.6 G/DL (ref 6.4–8.2)
RBC # BLD AUTO: 3.85 X10*6/UL (ref 4–5.2)
SODIUM SERPL-SCNC: 136 MMOL/L (ref 136–145)
WBC # BLD AUTO: 4.9 X10*3/UL (ref 4.4–11.3)

## 2025-09-05 PROCEDURE — 85027 COMPLETE CBC AUTOMATED: CPT

## 2025-09-05 PROCEDURE — 2500000001 HC RX 250 WO HCPCS SELF ADMINISTERED DRUGS (ALT 637 FOR MEDICARE OP): Performed by: STUDENT IN AN ORGANIZED HEALTH CARE EDUCATION/TRAINING PROGRAM

## 2025-09-05 PROCEDURE — 36415 COLL VENOUS BLD VENIPUNCTURE: CPT

## 2025-09-05 PROCEDURE — 83735 ASSAY OF MAGNESIUM: CPT

## 2025-09-05 PROCEDURE — 94640 AIRWAY INHALATION TREATMENT: CPT

## 2025-09-05 PROCEDURE — 2500000001 HC RX 250 WO HCPCS SELF ADMINISTERED DRUGS (ALT 637 FOR MEDICARE OP)

## 2025-09-05 PROCEDURE — 99231 SBSQ HOSP IP/OBS SF/LOW 25: CPT

## 2025-09-05 PROCEDURE — 80053 COMPREHEN METABOLIC PANEL: CPT

## 2025-09-05 PROCEDURE — 2500000004 HC RX 250 GENERAL PHARMACY W/ HCPCS (ALT 636 FOR OP/ED): Performed by: STUDENT IN AN ORGANIZED HEALTH CARE EDUCATION/TRAINING PROGRAM

## 2025-09-05 PROCEDURE — 1210000001 HC SEMI-PRIVATE ROOM DAILY

## 2025-09-05 PROCEDURE — 2500000002 HC RX 250 W HCPCS SELF ADMINISTERED DRUGS (ALT 637 FOR MEDICARE OP, ALT 636 FOR OP/ED): Performed by: STUDENT IN AN ORGANIZED HEALTH CARE EDUCATION/TRAINING PROGRAM

## 2025-09-05 RX ADMIN — FORMOTEROL FUMARATE 20 MCG: 20 SOLUTION RESPIRATORY (INHALATION) at 18:43

## 2025-09-05 RX ADMIN — CARVEDILOL 12.5 MG: 12.5 TABLET, FILM COATED ORAL at 08:57

## 2025-09-05 RX ADMIN — ENOXAPARIN SODIUM 40 MG: 100 INJECTION SUBCUTANEOUS at 08:57

## 2025-09-05 RX ADMIN — CEFUROXIME AXETIL 250 MG: 250 TABLET, FILM COATED ORAL at 20:48

## 2025-09-05 RX ADMIN — FORMOTEROL FUMARATE 20 MCG: 20 SOLUTION RESPIRATORY (INHALATION) at 07:01

## 2025-09-05 RX ADMIN — CEFUROXIME AXETIL 250 MG: 250 TABLET, FILM COATED ORAL at 08:57

## 2025-09-05 RX ADMIN — AMLODIPINE BESYLATE 10 MG: 10 TABLET ORAL at 08:57

## 2025-09-05 RX ADMIN — Medication 5 MG: at 22:07

## 2025-09-05 RX ADMIN — ROSUVASTATIN CALCIUM 10 MG: 10 TABLET, FILM COATED ORAL at 08:57

## 2025-09-05 RX ADMIN — PANTOPRAZOLE SODIUM 40 MG: 40 TABLET, DELAYED RELEASE ORAL at 06:31

## 2025-09-05 RX ADMIN — SERTRALINE HYDROCHLORIDE 50 MG: 50 TABLET ORAL at 08:57

## 2025-09-05 RX ADMIN — TIOTROPIUM BROMIDE INHALATION SPRAY 2 PUFF: 3.12 SPRAY, METERED RESPIRATORY (INHALATION) at 07:04

## 2025-09-05 ASSESSMENT — COGNITIVE AND FUNCTIONAL STATUS - GENERAL
STANDING UP FROM CHAIR USING ARMS: A LITTLE
DAILY ACTIVITIY SCORE: 24
WALKING IN HOSPITAL ROOM: A LITTLE
CLIMB 3 TO 5 STEPS WITH RAILING: A LOT
MOBILITY SCORE: 20

## 2025-09-05 ASSESSMENT — PAIN SCALES - GENERAL
PAINLEVEL_OUTOF10: 0 - NO PAIN
PAINLEVEL_OUTOF10: 0 - NO PAIN

## 2025-09-05 ASSESSMENT — PAIN - FUNCTIONAL ASSESSMENT: PAIN_FUNCTIONAL_ASSESSMENT: 0-10

## 2025-09-06 PROCEDURE — 99231 SBSQ HOSP IP/OBS SF/LOW 25: CPT

## 2025-09-06 PROCEDURE — 2500000004 HC RX 250 GENERAL PHARMACY W/ HCPCS (ALT 636 FOR OP/ED): Performed by: STUDENT IN AN ORGANIZED HEALTH CARE EDUCATION/TRAINING PROGRAM

## 2025-09-06 PROCEDURE — 2500000001 HC RX 250 WO HCPCS SELF ADMINISTERED DRUGS (ALT 637 FOR MEDICARE OP): Performed by: STUDENT IN AN ORGANIZED HEALTH CARE EDUCATION/TRAINING PROGRAM

## 2025-09-06 PROCEDURE — 2500000002 HC RX 250 W HCPCS SELF ADMINISTERED DRUGS (ALT 637 FOR MEDICARE OP, ALT 636 FOR OP/ED): Performed by: STUDENT IN AN ORGANIZED HEALTH CARE EDUCATION/TRAINING PROGRAM

## 2025-09-06 PROCEDURE — 1210000001 HC SEMI-PRIVATE ROOM DAILY

## 2025-09-06 PROCEDURE — 94640 AIRWAY INHALATION TREATMENT: CPT

## 2025-09-06 PROCEDURE — 2500000001 HC RX 250 WO HCPCS SELF ADMINISTERED DRUGS (ALT 637 FOR MEDICARE OP)

## 2025-09-06 RX ADMIN — TIOTROPIUM BROMIDE INHALATION SPRAY 2 PUFF: 3.12 SPRAY, METERED RESPIRATORY (INHALATION) at 07:35

## 2025-09-06 RX ADMIN — ROSUVASTATIN CALCIUM 10 MG: 10 TABLET, FILM COATED ORAL at 09:02

## 2025-09-06 RX ADMIN — CEFUROXIME AXETIL 250 MG: 250 TABLET, FILM COATED ORAL at 09:02

## 2025-09-06 RX ADMIN — FORMOTEROL FUMARATE 20 MCG: 20 SOLUTION RESPIRATORY (INHALATION) at 07:32

## 2025-09-06 RX ADMIN — CARVEDILOL 12.5 MG: 12.5 TABLET, FILM COATED ORAL at 09:02

## 2025-09-06 RX ADMIN — Medication 5 MG: at 22:38

## 2025-09-06 RX ADMIN — PANTOPRAZOLE SODIUM 40 MG: 40 TABLET, DELAYED RELEASE ORAL at 06:35

## 2025-09-06 RX ADMIN — CEFUROXIME AXETIL 250 MG: 250 TABLET, FILM COATED ORAL at 22:19

## 2025-09-06 RX ADMIN — ENOXAPARIN SODIUM 40 MG: 100 INJECTION SUBCUTANEOUS at 09:02

## 2025-09-06 RX ADMIN — AMLODIPINE BESYLATE 10 MG: 10 TABLET ORAL at 09:02

## 2025-09-06 RX ADMIN — SERTRALINE HYDROCHLORIDE 50 MG: 50 TABLET ORAL at 09:02

## 2025-09-06 ASSESSMENT — PAIN SCALES - GENERAL
PAINLEVEL_OUTOF10: 0 - NO PAIN
PAINLEVEL_OUTOF10: 0 - NO PAIN

## 2025-09-07 VITALS
DIASTOLIC BLOOD PRESSURE: 63 MMHG | OXYGEN SATURATION: 96 % | RESPIRATION RATE: 18 BRPM | HEIGHT: 61 IN | HEART RATE: 71 BPM | WEIGHT: 133.38 LBS | BODY MASS INDEX: 25.18 KG/M2 | SYSTOLIC BLOOD PRESSURE: 127 MMHG | TEMPERATURE: 97.7 F

## 2025-09-07 PROCEDURE — 1210000001 HC SEMI-PRIVATE ROOM DAILY

## 2025-09-07 PROCEDURE — 94640 AIRWAY INHALATION TREATMENT: CPT

## 2025-09-07 PROCEDURE — 2500000004 HC RX 250 GENERAL PHARMACY W/ HCPCS (ALT 636 FOR OP/ED): Performed by: STUDENT IN AN ORGANIZED HEALTH CARE EDUCATION/TRAINING PROGRAM

## 2025-09-07 PROCEDURE — 2500000001 HC RX 250 WO HCPCS SELF ADMINISTERED DRUGS (ALT 637 FOR MEDICARE OP): Performed by: STUDENT IN AN ORGANIZED HEALTH CARE EDUCATION/TRAINING PROGRAM

## 2025-09-07 PROCEDURE — 2500000002 HC RX 250 W HCPCS SELF ADMINISTERED DRUGS (ALT 637 FOR MEDICARE OP, ALT 636 FOR OP/ED): Performed by: STUDENT IN AN ORGANIZED HEALTH CARE EDUCATION/TRAINING PROGRAM

## 2025-09-07 PROCEDURE — 2500000001 HC RX 250 WO HCPCS SELF ADMINISTERED DRUGS (ALT 637 FOR MEDICARE OP)

## 2025-09-07 RX ADMIN — CEFUROXIME AXETIL 250 MG: 250 TABLET, FILM COATED ORAL at 21:41

## 2025-09-07 RX ADMIN — PANTOPRAZOLE SODIUM 40 MG: 40 TABLET, DELAYED RELEASE ORAL at 06:38

## 2025-09-07 RX ADMIN — ROSUVASTATIN CALCIUM 10 MG: 10 TABLET, FILM COATED ORAL at 09:43

## 2025-09-07 RX ADMIN — TIOTROPIUM BROMIDE INHALATION SPRAY 2 PUFF: 3.12 SPRAY, METERED RESPIRATORY (INHALATION) at 06:56

## 2025-09-07 RX ADMIN — AMLODIPINE BESYLATE 10 MG: 10 TABLET ORAL at 09:43

## 2025-09-07 RX ADMIN — Medication 5 MG: at 21:41

## 2025-09-07 RX ADMIN — SERTRALINE HYDROCHLORIDE 50 MG: 50 TABLET ORAL at 09:43

## 2025-09-07 RX ADMIN — FORMOTEROL FUMARATE 20 MCG: 20 SOLUTION RESPIRATORY (INHALATION) at 20:05

## 2025-09-07 RX ADMIN — ENOXAPARIN SODIUM 40 MG: 100 INJECTION SUBCUTANEOUS at 09:43

## 2025-09-07 RX ADMIN — FORMOTEROL FUMARATE 20 MCG: 20 SOLUTION RESPIRATORY (INHALATION) at 06:52

## 2025-09-07 RX ADMIN — CEFUROXIME AXETIL 250 MG: 250 TABLET, FILM COATED ORAL at 09:43

## 2025-09-07 RX ADMIN — CARVEDILOL 12.5 MG: 12.5 TABLET, FILM COATED ORAL at 09:43

## 2025-09-07 ASSESSMENT — PAIN SCALES - GENERAL
PAINLEVEL_OUTOF10: 0 - NO PAIN
PAINLEVEL_OUTOF10: 0 - NO PAIN

## (undated) DEVICE — SEAL, UNIVERSAL, 5-12MM

## (undated) DEVICE — DRAPE, SHEET, THREE QUARTER, FAN FOLD, 57 X 77 IN

## (undated) DEVICE — APPLICATOR, CHLORAPREP, W/ORANGE TINT, 26ML

## (undated) DEVICE — DRAPE, COLUMN, DAVINCI XI

## (undated) DEVICE — STAPLER, SUREFORM 45, DAVINCI XI

## (undated) DEVICE — CLEAN KIT, ANTIFOG SCOPE, SEE SHARP 150MM

## (undated) DEVICE — DRAPE, INCISE, ANTIMICROBIAL, IOBAN 2, LARGE, 17 X 23 IN, DISPOSABLE, STERILE

## (undated) DEVICE — FORCEPS, CADIERE, DAVINCI XI

## (undated) DEVICE — GRASPER, LONG, BIPOLAR, DAVINCI XI

## (undated) DEVICE — GRASPER, FENESTRATED TIP-UP XI

## (undated) DEVICE — OBTURATOR, BLADELESS , SU

## (undated) DEVICE — ARTERIAL LINE KIT, 20GA X 12CM, CUSTOM

## (undated) DEVICE — DRAPE, ARM XI

## (undated) DEVICE — CATHETER, THORACIC, STRAIGHT, ADULT, 28 FR, PVC

## (undated) DEVICE — SYSTEM, RETRIEVAL, 10MM REDEPLOYABLE

## (undated) DEVICE — RELOAD, SUREFORM 45, 4.6 BLACK

## (undated) DEVICE — CATHETER KIT, RADIAL ARTLINE, 20G X 1 3/4

## (undated) DEVICE — ASCOPE 4 SLIM (3.8/1.2), SINGLE USE, STERILE

## (undated) DEVICE — TIP, ELECTROSURGICAL, 6.5 BLADE, MODIFIED EXTENDED

## (undated) DEVICE — Device

## (undated) DEVICE — COLLECTION UNIT, DRAINAGE, THORACIC, SINGLE TUBE, DRY SUCTION, ATS COMPATIBLE, OASIS 3600, LF

## (undated) DEVICE — SYRINGE, 60 CC, IRRIGATION, BULB, CONTRO-BULB, PAPER POUCH

## (undated) DEVICE — GAUZE, KITTNER ROLL, STERILE

## (undated) DEVICE — SLEEVE, VASO PRESS, CALF GARMENT, MEDIUM, GREEN

## (undated) DEVICE — RELOAD, SUREFORM 45, 4.3 GREEN

## (undated) DEVICE — TUBING SET, TRI-LUMEN, FILTERED, F/AIRSEAL

## (undated) DEVICE — DRAPE, TIBURON, SPLIT SHEET, REINF ADH STRIP, 77X122

## (undated) DEVICE — PROTECTOR, HEEL/ANKLE/ELBOW, UNIVERSAL

## (undated) DEVICE — ACCESS PORT, 12MM, 120MM LENGTH, LOW PROFILE W/BLADELESS OPTICAL TIP

## (undated) DEVICE — SYRINGE, 20 CC, LUER SLIP